# Patient Record
Sex: FEMALE | Race: WHITE | NOT HISPANIC OR LATINO | Employment: FULL TIME | URBAN - METROPOLITAN AREA
[De-identification: names, ages, dates, MRNs, and addresses within clinical notes are randomized per-mention and may not be internally consistent; named-entity substitution may affect disease eponyms.]

---

## 2017-01-25 ENCOUNTER — OFFICE VISIT (OUTPATIENT)
Dept: NEUROLOGY | Facility: CLINIC | Age: 57
End: 2017-01-25
Payer: COMMERCIAL

## 2017-01-25 ENCOUNTER — LAB VISIT (OUTPATIENT)
Dept: LAB | Facility: HOSPITAL | Age: 57
End: 2017-01-25
Attending: PSYCHIATRY & NEUROLOGY
Payer: COMMERCIAL

## 2017-01-25 VITALS
HEART RATE: 73 BPM | HEIGHT: 61 IN | SYSTOLIC BLOOD PRESSURE: 117 MMHG | BODY MASS INDEX: 32.22 KG/M2 | WEIGHT: 170.63 LBS | DIASTOLIC BLOOD PRESSURE: 79 MMHG

## 2017-01-25 DIAGNOSIS — R13.10 DYSPHAGIA, UNSPECIFIED TYPE: Primary | ICD-10-CM

## 2017-01-25 DIAGNOSIS — R13.10 DYSPHAGIA, UNSPECIFIED TYPE: ICD-10-CM

## 2017-01-25 PROCEDURE — 1159F MED LIST DOCD IN RCRD: CPT | Mod: S$GLB,,, | Performed by: PSYCHIATRY & NEUROLOGY

## 2017-01-25 PROCEDURE — 83519 RIA NONANTIBODY: CPT

## 2017-01-25 PROCEDURE — 36415 COLL VENOUS BLD VENIPUNCTURE: CPT

## 2017-01-25 PROCEDURE — 99999 PR PBB SHADOW E&M-EST. PATIENT-LVL III: CPT | Mod: PBBFAC,,, | Performed by: PSYCHIATRY & NEUROLOGY

## 2017-01-25 PROCEDURE — 99214 OFFICE O/P EST MOD 30 MIN: CPT | Mod: S$GLB,,, | Performed by: PSYCHIATRY & NEUROLOGY

## 2017-01-25 NOTE — MR AVS SNAPSHOT
"    [place] [facility]  [address] [place] [address] Ave  [place] [address]  Phone: [phone]  Fax: [phone]                  Jennifer Blake   2017 8:40 AM   Office Visit    Description:  Female : 1960   Provider:  Roosevelt Yee MD   Department:  [facility]           Reason for Visit     Follow-up           Diagnoses this Visit        Comments    Dysphagia, unspecified type    -  Primary            To Do List           Goals (5 Years of Data)     None      [facility]     [facility] On Call Nurse Care Line -  Assistance  Registered nurses in the [facility] provide clinical advisement, health education, appointment booking, and other advisory services.  Call for this free service at [phone].             Medications           Message regarding Medications     Verify the changes and/or additions to your medication regime listed below are the same as discussed with your clinician today.  If any of these changes or additions are incorrect, please notify your healthcare provider.             Verify that the below list of medications is an accurate representation of the medications you are currently taking.  If none reported, the list may be blank. If incorrect, please contact your healthcare provider. Carry this list with you in case of emergency.           Current Medications     atorvastatin (LIPITOR) 10 MG tablet Take 1 tablet (10 mg total) by mouth once daily.    dicyclomine (BENTYL) 10 MG capsule 10 mg 3 (three) times daily.    pantoprazole (PROTONIX) 40 MG tablet Take 40 mg by mouth once daily.           Clinical Reference Information           Vital Signs - Last Recorded  Most recent update: 2017  8:37 AM by Claude Sloan MA    BP Pulse Ht Wt BMI    117/79 (BP Location: Left arm, Patient Position: Sitting, BP Method: Automatic) 73 5' 1" (1.549 m) 77.4 kg (170 lb 10.2 oz) 32.24 kg/m2      Blood Pressure          Most Recent Value    BP  117/79      Allergies as " of 1/25/2017     No Known Allergies      Immunizations Administered on Date of Encounter - 1/25/2017     None      Orders Placed During Today's Visit      Normal Orders This Visit    Ambulatory consult to ENT     Ambulatory referral to Psychiatry     Future Labs/Procedures Expected by Expires    MYASTHENIA GRAVIS PANEL, ADULT  1/25/2017 3/26/2018      [facility] Sign-Up     Activating your [facility] account is as easy as 1-2-3!     1) Visit [email or web address], select Sign Up Now, enter this activation code and your date of birth, then select Next.  [email or web address]  Expires: 1/27/2017  2:51 PM      2) Create a username and password to use when you visit [facility] in the future and select a security question in case you lose your password and select Next.    3) Enter your e-mail address and click Sign Up!    Additional Information  If you have questions, please e-mail [email or web address] or call [phone] to talk to our [facility] staff. Remember, [facility] is NOT to be used for urgent needs. For medical emergencies, dial 911.         Instructions      Taking Medicine Safely    Medicine is given to help treat or prevent illness. But if you dont take it correctly, it might not help. It might even harm you. Your doctor or pharmacist can help you learn the right way to take your medicine. Listed below are some tips to help you take medicine safely.  Safety tips  · Have a routine for taking each medicine. Make it part of something you do each day, such as brushing your teeth or eating a meal.  · When you go to the hospital or your doctors office, bring all your current medicines in their original boxes or bottles. If you cant do that, bring an up-to-date list of your medicines.  · Don't stop taking a prescription medicine unless your doctor tells you to. Doing so could make your condition worse.  · Don't share medicines.  · Let your doctor and pharmacist know of any allergies you have.  · Taking prescription medicines  with alcohol, street drugs, herbs, supplements, or even some over-the-counter medicines can be harmful. Talk to your doctor or pharmacist before using any of these things while taking a prescription medicine.  · When filling your prescriptions, try using the same pharmacy for all your medicines. If not, let the pharmacist know what medicines you are already on.  · Keep medicines out of the reach of children and pets.  · Don't use medicine that has  or that doesnt look or smell right. Get rid of it properly. To find out the right way to get rid of medicine:  ¨ Call your [facility] or [facility] household trash and recycling service and ask if a drug take-back program is available in your community.  ¨ Call your local pharmacy and ask the right way to get rid of the medicine.  ¨ Go to www.fda.gov/ForConsumers/ConsumerUpdates/aco117227 to learn how to get rid of medicines safely.  · Medicine that comes in a container for a single dose should be used only 1 time. If you use the container a second time, it may have germs in it that can cause illness. These illnesses include hepatitis B and C. They also include infections of the brain or spinal cord (meningitis and epidural abscess).   Using generic medicines  Medicines have brand names and generic (chemical) names. When a medicine is first made, it is sold only under its brand name. Later, it can be made and sold as a generic. Generic medicines cost less than brand-name medicines and most work just as well. Most people can use the generic medicine instead of the brand-name medicine, unless their doctor says otherwise.   © 6746-4615 [employer]. [address] [place] [address], [place] [address]. All rights reserved. This information is not intended as a substitute for professional medical care. Always follow your healthcare professional's instructions.

## 2017-01-25 NOTE — PROGRESS NOTES
[facility] NEUROLOGY  [facility]    Date: January 25, 2017   Patient Name: Jennifer Blake   MRN: [id number]   PCP: Matilde Isaacs  Referring Provider: No ref. provider found    Assessment:      Ms. Jennifer Blake is a 55 y.o. female who presents with a chief complaint of Sensation of fullness in her neck.  The patient remains extremely concerned about myasthenia gravis, though I can find no objective evidence on exam or any history suggestive of this diagnosis.  The patient requests serologic testing for myasthenia today, though I have counseled her that seronegative forms of myasthenia gravis, however I do not believe clinically her presentation is consistent with this diagnosis. She declines EMG/NCS which I counseled her would be the gold standard for diagnosis.  She has been evaluated with upper GI endoscopy, however we discussed the possibility of an evaluation by ENT for other causes.  She is amenable to this.  Additionally, the patient admits to significant anxiety and requested referral to psychiatry today.     Plan:       Throat fullness  -- obtaining myasthenia gravis panel per patient request  -- patient referred to ENT    Anxiety and Depression  -- patient referred to psychiatry    Roosevelt Yee MD  [facility]   Department of Neurology    Patient note was created using Dragon Dictation.  Any errors in syntax or even information may not have been identified and edited on initial review prior to signing this note.  Subjective:   Patient seen in consultation at the request of Dr. Isaacs for the evaluation of difficulty swallowing. A copy of this note will be sent to the referring physician.     HPI:   Ms. Jennifer Blake is a 56 y.o. female who presents in follow up for a subjective difficulty swallowing. The patient reports that she feels that she continues to experience and sensation of tightness and fullness in her throat. She continues to complain of the subjective  "tightness and fullness but states that the sensation now seems to improve swallowing, particularly while eating and drinking.  She states that she feels that she has gained several pounds that she is eating more frequently as she states that "it feels good" when she eats. She denies gagging or choking. She denies any weakness, vision change, fatigable chewing, exercise-induced weakness, head drop, diplopia, dysarthria, sensory change, or dysphagia. She states that she underwent an upper GI endoscopy and was told everything was normal during her evaluation. She is tearful throughout much of the visit and remains insistent that she believes that she has myasthenia gravis.  She repeatedly asks for serologic testing for myasthenia out of concern that she may have it as brother had a long and compensated history with the disease. She admits that she is struggling with anxiety and depression and states that she would like to see a psychiatrist    PAST MEDICAL HISTORY:  Past Medical History   Diagnosis Date    GERD (gastroesophageal reflux disease)     Hyperlipidemia     Kidney stone        PAST SURGICAL HISTORY:  Past Surgical History   Procedure Laterality Date    Tonsillectomy      Cystoscopy w/ laser lithotripsy      Esophagogastroduodenoscopy  07/28/2016     Dr. Contreras - Grade B reflux esophagitis and small hiatus hernia    Colonoscopy  09/16/2016     normal - repeat in 10 year - Dr. Damon Contreras = [facility]       CURRENT MEDS:  Current Outpatient Prescriptions   Medication Sig Dispense Refill    atorvastatin (LIPITOR) 10 MG tablet Take 1 tablet (10 mg total) by mouth once daily. 90 tablet 1    dicyclomine (BENTYL) 10 MG capsule 10 mg 3 (three) times daily.  3    pantoprazole (PROTONIX) 40 MG tablet Take 40 mg by mouth once daily.  3     No current facility-administered medications for this visit.        ALLERGIES:  Review of patient's allergies indicates:  No Known Allergies    FAMILY " "HISTORY:  Family History   Problem Relation Age of Onset    Heart disease Mother      passed age 83 heart attack    Diabetes Mother     Diabetes Father     Heart disease Father      CABG around age 65;  heart failure at 68    No Known Problems Sister     Hypertension Brother     Hyperlipidemia Brother     Diabetes Brother     Thyroid disease Sister     Hyperlipidemia Sister     Diabetes Brother     Thyroid disease Brother     Myasthenia gravis Brother     No Known Problems Daughter     No Known Problems Daughter      SOCIAL HISTORY:  Social History   Substance Use Topics    Smoking status: Never Smoker    Smokeless tobacco: None    Alcohol use Yes      Comment: social     Review of Systems:  12 review of systems is negative except for the symptoms mentioned in HPI.      Objective:     Vitals:    17 0835   BP: 117/79   BP Location: Left arm   Patient Position: Sitting   BP Method: Automatic   Pulse: 73   Weight: 77.4 kg (170 lb 10.2 oz)   Height: 5' 1" (1.549 m)     General: NAD, well nourished   Eyes: no tearing, discharge, no erythema   ENT: moist mucous membranes of the oral cavity, nares patent    Neck: Supple, full range of motion, no palpable masses or thyromegaly  Cardiovascular: Warm and well perfused, pulses equal and symmetrical  Lungs: Normal work of breathing, normal chest wall excursions  Skin: No rash, lesions, or breakdown on exposed skin  Psychiatry: Mood and affect are appropriate but intermittently tearful and anxious  Abdomen: soft, non tender, non distended  Extremeties: No cyanosis, clubbing or edema.    Neurological   MENTAL STATUS: Alert and oriented to person, place, and time. Attention and concentration within normal limits. Speech without dysarthria, repetition of lingual able to name and repeat without difficulty. Recent and remote memory within normal limits   CRANIAL NERVES: Lingual, labial, and palatal articulation intact. Visual fields intact. PERRL. EOMI. " Negative cover-uncover test. No ptosis with sustained upgaze. Facial sensation intact. Face symmetrical. Hearing grossly intact. Full shoulder shrug bilaterally. Tongue protrudes midline. Tongue in cheek and cheek puff intact  SENSORY: Sensation is intact to light touch throughout.    MOTOR: Normal bulk and tone. No fasciculations of tongue or body.  No pronator drift.  5/5 Neck flexion and extension. SCMs 5/5. 5/5 deltoid, biceps, triceps, interosseous, hand  bilaterally. 5/5 iliopsoas, knee extension/flexion, foot dorsi/plantarflexion bilaterally.    REFLEXES: Symmetric and 2+ throughout.   CEREBELLAR/COORDINATION/GAIT: Gait steady with normal arm swing and stride length. Normal rapid alternating movements.

## 2017-01-25 NOTE — PATIENT INSTRUCTIONS
Taking Medicine Safely    Medicine is given to help treat or prevent illness. But if you dont take it correctly, it might not help. It might even harm you. Your doctor or pharmacist can help you learn the right way to take your medicine. Listed below are some tips to help you take medicine safely.  Safety tips  · Have a routine for taking each medicine. Make it part of something you do each day, such as brushing your teeth or eating a meal.  · When you go to the hospital or your doctors office, bring all your current medicines in their original boxes or bottles. If you cant do that, bring an up-to-date list of your medicines.  · Don't stop taking a prescription medicine unless your doctor tells you to. Doing so could make your condition worse.  · Don't share medicines.  · Let your doctor and pharmacist know of any allergies you have.  · Taking prescription medicines with alcohol, street drugs, herbs, supplements, or even some over-the-counter medicines can be harmful. Talk to your doctor or pharmacist before using any of these things while taking a prescription medicine.  · When filling your prescriptions, try using the same pharmacy for all your medicines. If not, let the pharmacist know what medicines you are already on.  · Keep medicines out of the reach of children and pets.  · Don't use medicine that has  or that doesnt look or smell right. Get rid of it properly. To find out the right way to get rid of medicine:  ¨ Call your [facility] or [facility] household trash and recycling service and ask if a drug take-back program is available in your community.  ¨ Call your local pharmacy and ask the right way to get rid of the medicine.  ¨ Go to www.fda.gov/ForConsumers/ConsumerUpdates/you964602 to learn how to get rid of medicines safely.  · Medicine that comes in a container for a single dose should be used only 1 time. If you use the container a second time, it may have germs in it that can cause  illness. These illnesses include hepatitis B and C. They also include infections of the brain or spinal cord (meningitis and epidural abscess).   Using generic medicines  Medicines have brand names and generic (chemical) names. When a medicine is first made, it is sold only under its brand name. Later, it can be made and sold as a generic. Generic medicines cost less than brand-name medicines and most work just as well. Most people can use the generic medicine instead of the brand-name medicine, unless their doctor says otherwise.   © 9534-1005 [employer]. [address] [place] [address]. All rights reserved. This information is not intended as a substitute for professional medical care. Always follow your healthcare professional's instructions.

## 2017-02-06 LAB
ACHR BIND AB SER-SCNC: 0 NMOL/L
ACHR MOD AB/ACHR TOTAL SFR SER: 0 %
STRIA MUS AB TITR SER: NEGATIVE TITER
VGCC-N BIND AB SER-SCNC: NORMAL PMOL/L

## 2017-04-25 ENCOUNTER — OFFICE VISIT (OUTPATIENT)
Dept: FAMILY MEDICINE | Facility: CLINIC | Age: 57
End: 2017-04-25
Payer: COMMERCIAL

## 2017-04-25 VITALS
WEIGHT: 174.81 LBS | OXYGEN SATURATION: 97 % | HEIGHT: 61 IN | BODY MASS INDEX: 33 KG/M2 | TEMPERATURE: 98 F | SYSTOLIC BLOOD PRESSURE: 116 MMHG | HEART RATE: 75 BPM | DIASTOLIC BLOOD PRESSURE: 70 MMHG

## 2017-04-25 DIAGNOSIS — R31.9 URINARY TRACT INFECTION WITH HEMATURIA, SITE UNSPECIFIED: ICD-10-CM

## 2017-04-25 DIAGNOSIS — R30.0 DYSURIA: Primary | ICD-10-CM

## 2017-04-25 DIAGNOSIS — N39.0 URINARY TRACT INFECTION WITH HEMATURIA, SITE UNSPECIFIED: ICD-10-CM

## 2017-04-25 LAB
BILIRUB SERPL-MCNC: ABNORMAL MG/DL
BLOOD URINE, POC: ABNORMAL
COLOR, POC UA: ABNORMAL
GLUCOSE UR QL STRIP: ABNORMAL
KETONES UR QL STRIP: ABNORMAL
LEUKOCYTE ESTERASE URINE, POC: ABNORMAL
NITRITE, POC UA: ABNORMAL
PH, POC UA: 5
PROTEIN, POC: 30
SPECIFIC GRAVITY, POC UA: >1.03
UROBILINOGEN, POC UA: 0.2

## 2017-04-25 PROCEDURE — 87088 URINE BACTERIA CULTURE: CPT

## 2017-04-25 PROCEDURE — 99213 OFFICE O/P EST LOW 20 MIN: CPT | Mod: 25,S$GLB,, | Performed by: NURSE PRACTITIONER

## 2017-04-25 PROCEDURE — 99999 PR PBB SHADOW E&M-EST. PATIENT-LVL IV: CPT | Mod: PBBFAC,,, | Performed by: NURSE PRACTITIONER

## 2017-04-25 PROCEDURE — 1160F RVW MEDS BY RX/DR IN RCRD: CPT | Mod: S$GLB,,, | Performed by: NURSE PRACTITIONER

## 2017-04-25 PROCEDURE — 81001 URINALYSIS AUTO W/SCOPE: CPT | Mod: S$GLB,,, | Performed by: NURSE PRACTITIONER

## 2017-04-25 PROCEDURE — 87077 CULTURE AEROBIC IDENTIFY: CPT

## 2017-04-25 PROCEDURE — 87186 SC STD MICRODIL/AGAR DIL: CPT

## 2017-04-25 PROCEDURE — 87086 URINE CULTURE/COLONY COUNT: CPT

## 2017-04-25 RX ORDER — SULFAMETHOXAZOLE AND TRIMETHOPRIM 800; 160 MG/1; MG/1
1 TABLET ORAL 2 TIMES DAILY
Qty: 10 TABLET | Refills: 0 | Status: SHIPPED | OUTPATIENT
Start: 2017-04-25 | End: 2017-04-30

## 2017-04-25 NOTE — MR AVS SNAPSHOT
[address]  [place] [address]  Phone: [phone]  Fax: [phone]                  Jennifer Blake   2017 1:20 PM   Office Visit    Description:  Female : 1960   Provider:  Matilde Isaacs NP   Department:  [facility] ([facility]           Reason for Visit     Urinary Tract Infection           Diagnoses this Visit        Comments    Dysuria    -  Primary     Urinary tract infection with hematuria, site unspecified                To Do List           Goals (5 Years of Data)     None      Follow-Up and Disposition     Return if symptoms worsen or fail to improve.       These Medications        Disp Refills Start End    sulfamethoxazole-trimethoprim 800-160mg (BACTRIM DS) 800-160 mg Tab 10 tablet 0 2017    Take 1 tablet by mouth 2 (two) times daily. - Oral    Pharmacy: [facility] - [place] - [id number] [place] [address] #: [phone]         [facility] On Call     [facility] On Call Nurse Care Line -  Assistance  Unless otherwise directed by your provider, please contact [facility], our nurse care line that is available for  assistance.     Registered nurses in the [facility] provide: appointment scheduling, clinical advisement, health education, and other advisory services.  Call: [phone] (toll free)               Medications           Message regarding Medications     Verify the changes and/or additions to your medication regime listed below are the same as discussed with your clinician today.  If any of these changes or additions are incorrect, please notify your healthcare provider.        START taking these NEW medications        Refills    sulfamethoxazole-trimethoprim 800-160mg (BACTRIM DS) 800-160 mg Tab 0    Sig: Take 1 tablet by mouth 2 (two) times daily.    Class: Normal    Route: Oral      STOP taking these medications     dicyclomine (BENTYL) 10 MG capsule 10 mg 3  "(three) times daily.           Verify that the below list of medications is an accurate representation of the medications you are currently taking.  If none reported, the list may be blank. If incorrect, please contact your healthcare provider. Carry this list with you in case of emergency.           Current Medications     atorvastatin (LIPITOR) 10 MG tablet Take 1 tablet (10 mg total) by mouth once daily.    pantoprazole (PROTONIX) 40 MG tablet Take 40 mg by mouth once daily.    sulfamethoxazole-trimethoprim 800-160mg (BACTRIM DS) 800-160 mg Tab Take 1 tablet by mouth 2 (two) times daily.           Clinical Reference Information           Your Vitals Were     BP Pulse Temp Height Weight SpO2    116/70 75 97.9 °F (36.6 °C) (Oral) 5' 1" (1.549 m) 79.3 kg (174 lb 13.2 oz) 97%    BMI                33.03 kg/m2          Blood Pressure          Most Recent Value    BP  116/70      Allergies as of 4/25/2017     No Known Allergies      Immunizations Administered on Date of Encounter - 4/25/2017     None      Orders Placed During Today's Visit      Normal Orders This Visit    POCT urinalysis, dipstick or tablet reag     Urine culture          4/25/2017  1:55 PM - Noreen Escalona      Component Results     Component    Color, UA    dk yellow    Spec Grav UA    >1.030    pH, UA    5.0    WBC, UA    Large    Nitrite, UA    Neg    Protein    30    Glucose, UA    Neg    Ketones, UA    Neg    Urobilinogen, UA    0.2    Bilirubin    Neg    Blood, UA    Trace            [facility] Sign-Up     Activating your [facility] account is as easy as 1-2-3!     1) Visit [email or web address], select Sign Up Now, enter this activation code and your date of birth, then select Next.  [email or web address]  Expires: 6/9/2017  2:04 PM      2) Create a username and password to use when you visit [facility] in the future and select a security question in case you lose your password and select Next.    3) Enter your e-mail address and click Sign " "Up!    Additional Information  If you have questions, please e-mail [email or web address] or call [phone] to talk to our [facility] staff. Remember, [facility] is NOT to be used for urgent needs. For medical emergencies, dial 911.         Instructions      Bladder Infection, Female (Adult)    Urine is normally doesn't have any bacteria in it. But bacteria can get into the urinary tract from the skin around the rectum. Or they can travel in the blood from elsewhere in the body. Once they are in your urinary tract, they can cause infection in the urethra (urethritis), the bladder (cystitis), or the kidneys (pyelonephritis).  The most common place for an infection is in the bladder. This is called a bladder infection. This is one of the most common infections in women. Most bladder infections are easily treated. They are not serious unless the infection spreads to the kidney.  The phrases "bladder infection," "UTI," and "cystitis" are often used to describe the same thing. But they are not always the same. Cystitis is an inflammation of the bladder. The most common cause of cystitis is an infection.  Symptoms  The infection causes inflammation in the urethra and bladder. This causes many of the symptoms. The most common symptoms of a bladder infection are:  · Pain or burning when urinating  · Having to urinate more often than usual  · Urgent need to urinate  · Only a small amount of urine comes out  · Blood in urine  · Abdominal discomfort. This is usually in the lower abdomen above the pubic bone.  · Cloudy urine  · Strong- or bad-smelling urine  · Unable to urinate (urinary retention)  · Unable to hold urine in (urinary incontinence)  · Fever  · Loss of appetite  · Confusion (in older adults)  Causes  Bladder infections are not contagious. You can't get one from someone else, from a toilet seat, or from sharing a bath.  The most common cause of bladder infections is bacteria from the bowels. The bacteria get onto " the skin around the opening of the urethra. From there, they can get into the urine and travel up to the bladder, causing inflammation and infection. This usually happens because of:  · Wiping improperly after urinating. Always wipe from front to back.  · Bowel incontinence  · Pregnancy  · Procedures such as having a catheter inserted  · Older age  · Not emptying your bladder. This can allow bacteria a chance to grow in your urine.  · Dehydration  · Constipation  · Sex  · Use of a diaphragm for birth control   Treatment  Bladder infections are diagnosed by a urine test. They are treated with antibiotics and usually clear up quickly without complications. Treatment helps prevent a more serious kidney infection.  Medicines  Medicines can help in the treatment of a bladder infection:  · Take antibiotics until they are used up, even if you feel better. It is important to finish them to make sure the infection has cleared.  · You can use acetaminophen or ibuprofen for pain, fever, or discomfort, unless another medicine was prescribed. If you have chronic liver or kidney disease, talk with your healthcare provider before using these medicines. Also talk with your provider if you've ever had a stomach ulcer or gastrointestinal bleeding, or are taking blood-thinner medicines.  · If you are given phenazopydridine to reduce burning with urination, it will cause your urine to become a bright orange color. This can stain clothing.  Care and prevention  These self-care steps can help prevent future infections:  · Drink plenty of fluids to prevent dehydration and flush out your bladder. Do this unless you must restrict fluids for other health reasons, or your doctor told you not to.  · Proper cleaning after going to the bathroom is important. Wipe from front to back after using the toilet to prevent the spread of bacteria.  · Urinate more often. Don't try to hold urine in for a long time.  · Wear loose-fitting clothes and cotton  underwear. Avoid tight-fitting pants.  · Improve your diet and prevent constipation. Eat more fresh fruit and vegetables, and fiber, and less junk and fatty foods.  · Avoid sex until your symptoms are gone.  · Avoid caffeine, alcohol, and spicy foods. These can irritate your bladder.  · Urinate right after intercourse to flush out your bladder.  · If you use birth control pills and have frequent bladder infections, discuss it with your doctor.  Follow-up care  Call your healthcare provider if all symptoms are not gone after 3 days of treatment. This is especially important if you have repeat infections.  If a culture was done, you will be told if your treatment needs to be changed. If directed, you can call to find out the results.  If X-rays were done, you will be told if the results will affect your treatment.  Call 911  Call 911 if any of the following occur:  · Trouble breathing  · Hard to wake up or confusion  · Fainting or loss of consciousness  · Rapid heart rate  When to seek medical advice  Call your healthcare provider right away if any of these occur:  · Fever of 100.4ºF (38.0ºC) or higher, or as directed by your healthcare provider  · Symptoms are not better by the third day of treatment  · Back or belly (abdominal) pain that gets worse  · Repeated vomiting, or unable to keep medicine down  · Weakness or dizziness  · Vaginal discharge  · Pain, redness, or swelling in the outer vaginal area (labia)  Date Last Reviewed: 10/1/2016  © 0880-9422 [employer]. [address] [place] [address], [place] [address]. All rights reserved. This information is not intended as a substitute for professional medical care. Always follow your healthcare professional's instructions.             Language Assistance Services     ATTENTION: Language assistance services are available, free of charge. Please call [phone].      ATENCIÓN: Si habla español, tiene a wilder disposición servicios gratuitos de asistencia  lingüística. Cora al [phone].     KATHY Ý: N?u b?n nói Ti?ng Vi?t, có các d?ch v? h? tr? ngôn ng? mi?n phí dành cho b?n. G?i s? [phone].         [facility] complies with applicable Federal civil rights laws and does not discriminate on the basis of race, color, national origin, age, disability, or sex.

## 2017-04-25 NOTE — PATIENT INSTRUCTIONS

## 2017-04-25 NOTE — PROGRESS NOTES
Subjective:       Patient ID: Jennifer Blake is a 56 y.o. female.    Chief Complaint: Urinary Tract Infection    Urinary Tract Infection    This is a new problem. Progression since onset: started 3 days ago. The quality of the pain is described as burning. The pain is at a severity of 4/10. There has been no fever. She is sexually active. There is no history of pyelonephritis. Associated symptoms include frequency, hesitancy and urgency. Pertinent negatives include no chills, discharge, flank pain, hematuria, nausea, vomiting, bubble bath use, constipation or rash. Associated symptoms comments: Mild burning with urination. She has tried nothing for the symptoms. Her past medical history is significant for kidney stones. There is no history of diabetes mellitus or hypertension.       Previous Medications    ATORVASTATIN (LIPITOR) 10 MG TABLET    Take 1 tablet (10 mg total) by mouth once daily.    PANTOPRAZOLE (PROTONIX) 40 MG TABLET    Take 40 mg by mouth once daily.       Past Medical History:   Diagnosis Date    GERD (gastroesophageal reflux disease)     Hyperlipidemia     Kidney stone        Past Surgical History:   Procedure Laterality Date    COLONOSCOPY  2016    normal - repeat in 10 year - Dr. Damon Contreras = [facility]    CYSTOSCOPY W/ LASER LITHOTRIPSY      ESOPHAGOGASTRODUODENOSCOPY  2016    Dr. Contreras - Grade B reflux esophagitis and small hiatus hernia    TONSILLECTOMY         Family History   Problem Relation Age of Onset    Heart disease Mother      passed age 83 heart attack    Diabetes Mother     Diabetes Father     Heart disease Father      CABG around age 65;  heart failure at 68    No Known Problems Sister     Hypertension Brother     Hyperlipidemia Brother     Diabetes Brother     Thyroid disease Sister     Hyperlipidemia Sister     Diabetes Brother     Thyroid disease Brother     Myasthenia gravis Brother     No Known Problems Daughter     No  "Known Problems Daughter        Social History     Social History    Marital status:      Spouse name: N/A    Number of children: N/A    Years of education: N/A     Occupational History          Social History Main Topics    Smoking status: Never Smoker    Smokeless tobacco: None    Alcohol use Yes      Comment: social    Drug use: No    Sexual activity: Not Asked     Other Topics Concern    None     Social History Narrative       Review of Systems   Constitutional: Negative for appetite change, chills, fatigue, fever and unexpected weight change.   HENT: Negative for congestion, ear pain, mouth sores, nosebleeds, postnasal drip, rhinorrhea, sinus pressure, sneezing, sore throat, trouble swallowing and voice change.    Eyes: Negative for photophobia, pain, discharge, redness, itching and visual disturbance.   Respiratory: Negative for cough, chest tightness and shortness of breath.    Cardiovascular: Negative for chest pain, palpitations and leg swelling.   Gastrointestinal: Negative for abdominal pain, blood in stool, constipation, diarrhea, nausea and vomiting.   Genitourinary: Positive for dysuria, frequency, hesitancy and urgency. Negative for flank pain and hematuria.        Suprapubic pressure/pain   Musculoskeletal: Negative for arthralgias, back pain, joint swelling and myalgias.   Skin: Negative for color change and rash.   Allergic/Immunologic: Negative for immunocompromised state.   Neurological: Negative for dizziness, seizures, syncope, weakness and headaches.   Hematological: Negative for adenopathy. Does not bruise/bleed easily.   Psychiatric/Behavioral: Negative for agitation, dysphoric mood, sleep disturbance and suicidal ideas. The patient is not nervous/anxious.          Objective:     Vitals:    04/25/17 1318   BP: 116/70   Pulse: 75   Temp: 97.9 °F (36.6 °C)   TempSrc: Oral   SpO2: 97%   Weight: 79.3 kg (174 lb 13.2 oz)   Height: 5' 1" (1.549 m)          Physical Exam "   Constitutional: She is oriented to person, place, and time. She appears well-developed and well-nourished. No distress.   + obesity with Body mass index is 30.91 kg/(m^2).     HENT:   Head: Normocephalic and atraumatic.   Right Ear: External ear normal. Tympanic membrane is scarred and perforated.   Left Ear: External ear normal. Tympanic membrane is scarred and perforated.   Ears:    Nose: Nose normal.   Mouth/Throat: Oropharynx is clear and moist. No oropharyngeal exudate.   + old perforations to six o'clock position on bilateral TMs.  Patient states she does not think she had tubes in ears before.  + scattered scarring as well to bilateral TMs.   Eyes: EOM are normal. Pupils are equal, round, and reactive to light.   Neck: Normal range of motion. Neck supple. No tracheal deviation present. No thyromegaly present.   Cardiovascular: Normal rate, regular rhythm and normal heart sounds.    No murmur heard.  Pulmonary/Chest: Effort normal and breath sounds normal. No respiratory distress.   Abdominal: Soft. Bowel sounds are normal. She exhibits no distension and no mass. There is no tenderness. There is no rebound and no guarding. No hernia.   Musculoskeletal: Normal range of motion. She exhibits no edema.   Lymphadenopathy:     She has no cervical adenopathy.   Neurological: She is alert and oriented to person, place, and time. No cranial nerve deficit. Coordination normal.   Skin: Skin is warm and dry. No rash noted. She is not diaphoretic.   Psychiatric: She has a normal mood and affect.       Office Visit on 04/25/2017   Component Date Value Ref Range Status    Color, UA 04/25/2017 dk yellow   Final    Spec Grav UA 04/25/2017 >1.030   Final    pH, UA 04/25/2017 5.0   Final    WBC, UA 04/25/2017 Large   Final    Nitrite, UA 04/25/2017 Neg   Final    Protein 04/25/2017 30   Final    Glucose, UA 04/25/2017 Neg   Final    Ketones, UA 04/25/2017 Neg   Final    Urobilinogen, UA 04/25/2017 0.2   Final     Bilirubin 04/25/2017 Neg   Final    Blood, UA 04/25/2017 Trace   Final       Assessment:         ICD-10-CM ICD-9-CM   1. Dysuria R30.0 788.1   2. Urinary tract infection with hematuria, site unspecified N39.0 599.0    R31.9        Plan:       Dysuria  -     POCT urinalysis, dipstick or tablet reag    Urinary tract infection with hematuria, site unspecified  -  + large leukocytes - will treat with bactrim and send in culture.  See handout for other home care instructions.  -     Urine culture  -     sulfamethoxazole-trimethoprim 800-160mg (BACTRIM DS) 800-160 mg Tab; Take 1 tablet by mouth 2 (two) times daily.  Dispense: 10 tablet; Refill: 0    Return if symptoms worsen or fail to improve.     Patient's Medications   New Prescriptions    SULFAMETHOXAZOLE-TRIMETHOPRIM 800-160MG (BACTRIM DS) 800-160 MG TAB    Take 1 tablet by mouth 2 (two) times daily.   Previous Medications    ATORVASTATIN (LIPITOR) 10 MG TABLET    Take 1 tablet (10 mg total) by mouth once daily.    PANTOPRAZOLE (PROTONIX) 40 MG TABLET    Take 40 mg by mouth once daily.   Modified Medications    No medications on file   Discontinued Medications    DICYCLOMINE (BENTYL) 10 MG CAPSULE    10 mg 3 (three) times daily.

## 2017-04-28 ENCOUNTER — TELEPHONE (OUTPATIENT)
Dept: FAMILY MEDICINE | Facility: CLINIC | Age: 57
End: 2017-04-28

## 2017-04-28 LAB — BACTERIA UR CULT: NORMAL

## 2017-04-28 NOTE — TELEPHONE ENCOUNTER
----- Message from Matilde Isaacs NP sent at 4/28/2017  1:14 PM CDT -----  Please call patient and inform her that her urine cultures shows that the bacteria is sensitive to Bactrim - take antibiotic until completed and this should resolve the infection.  Please document when patient is notified.

## 2017-06-27 ENCOUNTER — TELEPHONE (OUTPATIENT)
Dept: FAMILY MEDICINE | Facility: CLINIC | Age: 57
End: 2017-06-27

## 2017-06-27 DIAGNOSIS — Z13.29 THYROID DISORDER SCREEN: ICD-10-CM

## 2017-06-27 DIAGNOSIS — Z13.0 SCREENING, ANEMIA, DEFICIENCY, IRON: Primary | ICD-10-CM

## 2017-06-27 NOTE — TELEPHONE ENCOUNTER
Gladys, CMP and Lipid were already in so I added the CBC and TSH for 4 labs total - schedule lab appointment and then WELLNESS follow up.  The WELLNESS must be after July 26th since that was the last wellness UNLESS patient is just looking to have follow up appointment on cholesterol - then she just needs CMP and Lipid

## 2017-06-27 NOTE — TELEPHONE ENCOUNTER
----- Message from Thea Mckeon sent at 6/27/2017 10:29 AM CDT -----  Contact: [phone]  Patient would like blood work orders put in the system for an upcoming appointment and she would like a callback to schedule labs.  Please advise

## 2017-08-02 ENCOUNTER — OFFICE VISIT (OUTPATIENT)
Dept: FAMILY MEDICINE | Facility: CLINIC | Age: 57
End: 2017-08-02
Payer: COMMERCIAL

## 2017-08-02 VITALS
DIASTOLIC BLOOD PRESSURE: 80 MMHG | OXYGEN SATURATION: 98 % | BODY MASS INDEX: 31.92 KG/M2 | WEIGHT: 169.06 LBS | SYSTOLIC BLOOD PRESSURE: 118 MMHG | HEIGHT: 61 IN | TEMPERATURE: 98 F | HEART RATE: 64 BPM

## 2017-08-02 DIAGNOSIS — K21.9 GASTROESOPHAGEAL REFLUX DISEASE, ESOPHAGITIS PRESENCE NOT SPECIFIED: ICD-10-CM

## 2017-08-02 DIAGNOSIS — Z00.00 ANNUAL PHYSICAL EXAM: Primary | ICD-10-CM

## 2017-08-02 DIAGNOSIS — E78.5 HYPERLIPIDEMIA, UNSPECIFIED HYPERLIPIDEMIA TYPE: ICD-10-CM

## 2017-08-02 PROCEDURE — 99999 PR PBB SHADOW E&M-EST. PATIENT-LVL III: CPT | Mod: PBBFAC,,, | Performed by: NURSE PRACTITIONER

## 2017-08-02 PROCEDURE — 99396 PREV VISIT EST AGE 40-64: CPT | Mod: S$GLB,,, | Performed by: NURSE PRACTITIONER

## 2017-08-02 NOTE — PROGRESS NOTES
Subjective:       Patient ID: Hallie Blake is a 56 y.o. female.    Chief Complaint: Annual Exam (wellness)    Patient is a 56 year old white female here today for annual physical exam with fasting lab results.    Patient has Hyperlipidemia that is controlled on Atorvastatin at present dose with LDL < 100.  See results below.    Patient has chronic GERD - had last EGD in July 2016 - showed Grade B reflux esophagitis and small hiatus hernia.    Patient voices no complaints today.    Wellness labs:  -  CBC WNL  -  CMP WNL  -  Cholesterol controlled on present medication.  -  TSH WNL    Health Maintenance:  -  Due for repeat mammogram - will schedule with Dr. Abreu.  -  We do not have Flu Vaccines available at this time.          Component      Latest Ref Rng & Units 7/6/2017 7/11/2016 6/22/2015   WBC      3.90 - 12.70 K/uL 6.22 7.92    RBC      4.00 - 5.40 M/uL 4.33 4.31    Hemoglobin      12.0 - 16.0 g/dL 13.2 13.2    Hematocrit      37.0 - 48.5 % 41.0 41.3    MCV      82 - 98 fL 95 96    MCH      27.0 - 31.0 pg 30.5 30.6    MCHC      32.0 - 36.0 % 32.2 32.0    RDW      11.5 - 14.5 % 13.8 14.0    Platelets      150 - 350 K/uL 227 247    MPV      9.2 - 12.9 fL 10.2 10.0    Gran #      1.8 - 7.7 K/uL 3.2 5.3    Lymph #      1.0 - 4.8 K/uL 2.2 2.1    Mono #      0.3 - 1.0 K/uL 0.5 0.4    Eos #      0.0 - 0.5 K/uL 0.3 0.1    Baso #      0.00 - 0.20 K/uL 0.01 0.01    Gran%      38.0 - 73.0 % 52.1 66.6    Lymph%      18.0 - 48.0 % 35.0 26.1    Mono%      4.0 - 15.0 % 8.5 5.6    Eosinophil%      0.0 - 8.0 % 4.2 1.5    Basophil%      0.0 - 1.9 % 0.2 0.1    Differential Method       Automated Automated    Sodium      136 - 145 mmol/L 143 143 142   Potassium      3.5 - 5.1 mmol/L 4.2 4.2 4.1   Chloride      95 - 110 mmol/L 107 108 109   CO2      23 - 29 mmol/L 26 24 27   Glucose      70 - 110 mg/dL 88 91 89   BUN, Bld      7 - 17 mg/dL 16 15 13   Creatinine      0.50 - 1.40 mg/dL 0.65 0.8 0.8   Calcium      8.7 - 10.5 mg/dL  9.6 9.5 9.5   Total Protein      6.0 - 8.4 g/dL 7.4 7.4 7.0   Albumin      3.5 - 5.2 g/dL 4.2 4.1 4.0   Total Bilirubin      0.1 - 1.0 mg/dL 0.6 0.6 0.6   Alkaline Phosphatase      38 - 126 U/L 111 126 102   AST      15 - 46 U/L 30 30 23   ALT      10 - 44 U/L 42 32 19   Anion Gap      8 - 16 mmol/L 10 11 6 (L)   eGFR if African American      >60 mL/min/1.73 m:2 >60.0 >60.0 >60.0   eGFR if non African American      >60 mL/min/1.73 m:2 >60.0 >60.0 >60.0   Cholesterol      120 - 199 mg/dL 169 211 (H) 201 (H)   Triglycerides      30 - 150 mg/dL 116 144 174 (H)   HDL      40 - 75 mg/dL 48 55 54   LDL Cholesterol      63.0 - 159.0 mg/dL 97.8 127.2 112.2   HDL/Chol Ratio      20.0 - 50.0 % 28.4 26.1 26.9   Total Cholesterol/HDL Ratio      2.0 - 5.0 3.5 3.8 3.7   Non-HDL Cholesterol      mg/dL 121 156 147   TSH      0.400 - 4.000 uIU/mL 1.520 1.786        Previous Medications    ATORVASTATIN (LIPITOR) 10 MG TABLET    Take 1 tablet (10 mg total) by mouth once daily.    PANTOPRAZOLE (PROTONIX) 40 MG TABLET    Take 40 mg by mouth once daily.       Past Medical History:   Diagnosis Date    GERD (gastroesophageal reflux disease)     Hyperlipidemia     Kidney stone        Past Surgical History:   Procedure Laterality Date    COLONOSCOPY  2016    normal - repeat in 10 year - Dr. Damon Contreras = [facility]    CYSTOSCOPY W/ LASER LITHOTRIPSY      ESOPHAGOGASTRODUODENOSCOPY  2016    Dr. Contreras - Grade B reflux esophagitis and small hiatus hernia    TONSILLECTOMY         Family History   Problem Relation Age of Onset    Heart disease Mother      passed age 83 heart attack    Diabetes Mother     Diabetes Father     Heart disease Father      CABG around age 65;  heart failure at 68    No Known Problems Sister     Hypertension Brother     Hyperlipidemia Brother     Diabetes Brother     Thyroid disease Sister     Hyperlipidemia Sister     Diabetes Brother     Thyroid disease Brother      Myasthenia gravis Brother     No Known Problems Daughter     No Known Problems Daughter        Social History     Social History    Marital status:      Spouse name: N/A    Number of children: N/A    Years of education: N/A     Occupational History          Social History Main Topics    Smoking status: Never Smoker    Smokeless tobacco: Never Used    Alcohol use Yes      Comment: social    Drug use: No    Sexual activity: Not Asked     Other Topics Concern    None     Social History Narrative    None       Review of Systems   Constitutional: Negative for appetite change, chills, fatigue, fever and unexpected weight change.   HENT: Negative for congestion, ear pain, mouth sores, nosebleeds, postnasal drip, rhinorrhea, sinus pressure, sneezing, sore throat, trouble swallowing and voice change.    Eyes: Negative for photophobia, pain, discharge, redness, itching and visual disturbance.   Respiratory: Negative for cough, chest tightness and shortness of breath.    Cardiovascular: Negative for chest pain, palpitations and leg swelling.   Gastrointestinal: Negative for abdominal pain, blood in stool, constipation, diarrhea, nausea and vomiting.   Genitourinary: Negative for dysuria, frequency, hematuria and urgency.   Musculoskeletal: Negative for arthralgias, back pain, joint swelling and myalgias.   Skin: Negative for color change and rash.   Allergic/Immunologic: Negative for immunocompromised state.   Neurological: Negative for dizziness, seizures, syncope, weakness and headaches.   Hematological: Negative for adenopathy. Does not bruise/bleed easily.   Psychiatric/Behavioral: Negative for agitation, dysphoric mood, sleep disturbance and suicidal ideas. The patient is not nervous/anxious.          Objective:     Vitals:    08/02/17 0807   BP: 118/80   BP Location: Right arm   Patient Position: Sitting   BP Method: Manual   Pulse: 64   Temp: 97.8 °F (36.6 °C)   TempSrc: Oral   SpO2: 98%  "  Weight: 76.7 kg (169 lb 1.5 oz)   Height: 5' 1" (1.549 m)          Physical Exam   Constitutional: She is oriented to person, place, and time. She appears well-developed and well-nourished. No distress.   + obesity with Body mass index is 31.95 kg/m².       HENT:   Head: Normocephalic and atraumatic.   Right Ear: External ear normal. Tympanic membrane is scarred and perforated.   Left Ear: External ear normal. Tympanic membrane is scarred and perforated.   Ears:    Nose: Nose normal.   Mouth/Throat: Oropharynx is clear and moist. No oropharyngeal exudate.   + old perforations to six o'clock position on bilateral TMs.  Patient states she does not think she had tubes in ears before.  + scattered scarring as well to bilateral TMs.   Eyes: EOM are normal. Pupils are equal, round, and reactive to light.   Neck: Normal range of motion. Neck supple. No tracheal deviation present. No thyromegaly present.   Cardiovascular: Normal rate, regular rhythm and normal heart sounds.    No murmur heard.  Pulmonary/Chest: Effort normal and breath sounds normal. No respiratory distress.   Abdominal: Soft. Bowel sounds are normal. She exhibits no distension and no mass. There is no tenderness. There is no rebound and no guarding. No hernia.   Musculoskeletal: Normal range of motion. She exhibits no edema.   Lymphadenopathy:     She has no cervical adenopathy.   Neurological: She is alert and oriented to person, place, and time. No cranial nerve deficit. Coordination normal.   Skin: Skin is warm and dry. No rash noted. She is not diaphoretic.   Psychiatric: She has a normal mood and affect.         Assessment:         ICD-10-CM ICD-9-CM   1. Annual physical exam Z00.00 V70.0   2. Hyperlipidemia, unspecified hyperlipidemia type E78.5 272.4   3. Gastroesophageal reflux disease, esophagitis presence not specified K21.9 530.81       Plan:       Annual physical exam  -  Will schedule mammogram with Dr. Abreu.  Health Maintenance Summary "     Influenza Vaccine Postponed 10/2/2017 Originally 8/1/2017. Patient Scheduled    Declined 4/25/2017    Mammogram Next Due 11/2/2017     Done 11/2/2015    Pap Smear with HPV Cotest Next Due 11/2/2018     Done 11/2/2015    Lipid Panel Next Due 7/6/2022     Done 7/6/2017 LIPID PANEL    Done 7/11/2016 LIPID PANEL (A)    Done 6/22/2015 LIPID PANEL (A)   TETANUS VACCINE Next Due 7/26/2026     Done 7/26/2016 Imm Admin: Tdap    Done 8/5/2002 Imm Admin: Tdap   Colonoscopy Next Due 9/16/2026     Done 9/16/2016     Declined 7/26/2016 states she will schedule with Dr. Contreras   Hepatitis C Screening Completed     Done 7/11/2016 HEPATITIS C ANTIBODY         Hyperlipidemia, unspecified hyperlipidemia type  -  Controlled on Atorvastatin 10 mg daily.  Will send refill request when needed.  Return in 6 months.    Gastroesophageal reflux disease, esophagitis presence not specified  -  Controlled on Protonix prescribed by GI MD, Dr. Contreras.      Return in about 6 months (around 2/2/2018) for fasting labs and office visit.     Patient's Medications   New Prescriptions    No medications on file   Previous Medications    ATORVASTATIN (LIPITOR) 10 MG TABLET    Take 1 tablet (10 mg total) by mouth once daily.    PANTOPRAZOLE (PROTONIX) 40 MG TABLET    Take 40 mg by mouth once daily.   Modified Medications    No medications on file   Discontinued Medications    No medications on file

## 2017-08-14 RX ORDER — ATORVASTATIN CALCIUM 10 MG/1
10 TABLET, FILM COATED ORAL DAILY
Qty: 90 TABLET | Refills: 1 | Status: SHIPPED | OUTPATIENT
Start: 2017-08-14 | End: 2018-03-22 | Stop reason: SDUPTHER

## 2017-08-14 NOTE — TELEPHONE ENCOUNTER
----- Message from Flor Juares sent at 8/14/2017 12:07 PM CDT -----  Contact: self/[phone]  The pharmacy sent over a request on the atorvastatin (LIPITOR) 10 MG tablet has not receive response.

## 2017-10-31 ENCOUNTER — OFFICE VISIT (OUTPATIENT)
Dept: FAMILY MEDICINE | Facility: CLINIC | Age: 57
End: 2017-10-31
Payer: COMMERCIAL

## 2017-10-31 VITALS
SYSTOLIC BLOOD PRESSURE: 110 MMHG | TEMPERATURE: 98 F | DIASTOLIC BLOOD PRESSURE: 78 MMHG | HEART RATE: 72 BPM | WEIGHT: 174.19 LBS | HEIGHT: 61 IN | BODY MASS INDEX: 32.89 KG/M2 | OXYGEN SATURATION: 97 %

## 2017-10-31 DIAGNOSIS — N39.0 URINARY TRACT INFECTION WITH HEMATURIA, SITE UNSPECIFIED: Primary | ICD-10-CM

## 2017-10-31 DIAGNOSIS — R31.9 URINARY TRACT INFECTION WITH HEMATURIA, SITE UNSPECIFIED: Primary | ICD-10-CM

## 2017-10-31 DIAGNOSIS — R30.0 DYSURIA: ICD-10-CM

## 2017-10-31 LAB
BILIRUB SERPL-MCNC: NEGATIVE MG/DL
BLOOD URINE, POC: ABNORMAL
COLOR, POC UA: ABNORMAL
GLUCOSE UR QL STRIP: NEGATIVE
KETONES UR QL STRIP: NEGATIVE
LEUKOCYTE ESTERASE URINE, POC: ABNORMAL
NITRITE, POC UA: NEGATIVE
PH, POC UA: 5.5
PROTEIN, POC: 30
SPECIFIC GRAVITY, POC UA: 1.03
UROBILINOGEN, POC UA: 0.2

## 2017-10-31 PROCEDURE — 99213 OFFICE O/P EST LOW 20 MIN: CPT | Mod: 25,S$GLB,, | Performed by: NURSE PRACTITIONER

## 2017-10-31 PROCEDURE — 99999 PR PBB SHADOW E&M-EST. PATIENT-LVL IV: CPT | Mod: PBBFAC,,, | Performed by: NURSE PRACTITIONER

## 2017-10-31 PROCEDURE — 81001 URINALYSIS AUTO W/SCOPE: CPT | Mod: S$GLB,,, | Performed by: NURSE PRACTITIONER

## 2017-10-31 PROCEDURE — 87086 URINE CULTURE/COLONY COUNT: CPT

## 2017-10-31 RX ORDER — CIPROFLOXACIN 250 MG/1
250 TABLET, FILM COATED ORAL 2 TIMES DAILY
Qty: 10 TABLET | Refills: 0 | Status: SHIPPED | OUTPATIENT
Start: 2017-10-31 | End: 2017-11-05

## 2017-10-31 NOTE — PATIENT INSTRUCTIONS

## 2017-11-01 LAB — BACTERIA UR CULT: NO GROWTH

## 2018-03-22 ENCOUNTER — OFFICE VISIT (OUTPATIENT)
Dept: FAMILY MEDICINE | Facility: CLINIC | Age: 58
End: 2018-03-22
Payer: COMMERCIAL

## 2018-03-22 VITALS
DIASTOLIC BLOOD PRESSURE: 70 MMHG | BODY MASS INDEX: 31.51 KG/M2 | SYSTOLIC BLOOD PRESSURE: 104 MMHG | HEIGHT: 61 IN | TEMPERATURE: 98 F | HEART RATE: 70 BPM | OXYGEN SATURATION: 97 % | WEIGHT: 166.88 LBS

## 2018-03-22 DIAGNOSIS — Z13.1 DIABETES MELLITUS SCREENING: ICD-10-CM

## 2018-03-22 DIAGNOSIS — Z12.39 BREAST CANCER SCREENING: ICD-10-CM

## 2018-03-22 DIAGNOSIS — K21.9 GASTROESOPHAGEAL REFLUX DISEASE, ESOPHAGITIS PRESENCE NOT SPECIFIED: ICD-10-CM

## 2018-03-22 DIAGNOSIS — Z13.0 SCREENING, ANEMIA, DEFICIENCY, IRON: ICD-10-CM

## 2018-03-22 DIAGNOSIS — E78.5 HYPERLIPIDEMIA, UNSPECIFIED HYPERLIPIDEMIA TYPE: Primary | ICD-10-CM

## 2018-03-22 DIAGNOSIS — Z13.29 THYROID DISORDER SCREEN: ICD-10-CM

## 2018-03-22 DIAGNOSIS — F43.0 STRESS REACTION CAUSING MIXED DISTURBANCE OF EMOTION AND CONDUCT: ICD-10-CM

## 2018-03-22 PROCEDURE — 99999 PR PBB SHADOW E&M-EST. PATIENT-LVL IV: CPT | Mod: PBBFAC,,, | Performed by: NURSE PRACTITIONER

## 2018-03-22 PROCEDURE — 99214 OFFICE O/P EST MOD 30 MIN: CPT | Mod: S$GLB,,, | Performed by: NURSE PRACTITIONER

## 2018-03-22 RX ORDER — ESCITALOPRAM OXALATE 10 MG/1
10 TABLET ORAL DAILY
Qty: 30 TABLET | Refills: 1 | Status: SHIPPED | OUTPATIENT
Start: 2018-03-22 | End: 2019-02-20

## 2018-03-22 RX ORDER — PANTOPRAZOLE SODIUM 40 MG/1
40 TABLET, DELAYED RELEASE ORAL DAILY
Qty: 90 TABLET | Refills: 1 | Status: SHIPPED | OUTPATIENT
Start: 2018-03-22 | End: 2019-02-20 | Stop reason: SDUPTHER

## 2018-03-22 RX ORDER — ATORVASTATIN CALCIUM 10 MG/1
10 TABLET, FILM COATED ORAL DAILY
Qty: 90 TABLET | Refills: 1 | Status: SHIPPED | OUTPATIENT
Start: 2018-03-22 | End: 2018-09-05 | Stop reason: SDUPTHER

## 2018-03-22 NOTE — PROGRESS NOTES
Subjective:       Patient ID: Hallie Blake is a 57 y.o. female.    Chief Complaint: Follow-up (lab results)    Patient is here today for follow-up with fasting lab results.    Patient has hyperlipidemia and takes atorvastatin 10 mg daily.  Patient admits to noncompliance with medication stating she forgets to take daily.  Her total cholesterol is elevated at 207 with an LDL of 124.8.  Advised patient that we need better compliance with medications and lifestyle modifications.    Patient has chronic GERD.  She takes Protonix as needed.  She had her last EGD in July 2016 that showed grade B reflux esophagitis and a small hiatus hernia.    Patient reports depressed mood and anxiety.  She reports she has increased stressors at home.  She reports she is thinking of retiring and has concerns for the future.  She reports concern for her daughter's key is with increased stressors.  She would like to discuss medication.  She has no history of anxiety or depression and has never been on a medication in the past.    Patient is overdue for mammogram.  She will like to have done at diagnostic imaging.  External order given to patient.      Component      Latest Ref Rng & Units 3/14/2018 7/6/2017 7/11/2016 6/22/2015   Sodium      136 - 145 mmol/L 143 143 143 142   Potassium      3.5 - 5.1 mmol/L 4.0 4.2 4.2 4.1   Chloride      95 - 110 mmol/L 105 107 108 109   CO2      23 - 29 mmol/L 28 26 24 27   Glucose      70 - 110 mg/dL 94 88 91 89   BUN, Bld      7 - 17 mg/dL 14 16 15 13   Creatinine      0.50 - 1.40 mg/dL 0.65 0.65 0.8 0.8   Calcium      8.7 - 10.5 mg/dL 9.7 9.6 9.5 9.5   Total Protein      6.0 - 8.4 g/dL 7.7 7.4 7.4 7.0   Albumin      3.5 - 5.2 g/dL 4.5 4.2 4.1 4.0   Total Bilirubin      0.1 - 1.0 mg/dL 0.4 0.6 0.6 0.6   Alkaline Phosphatase      38 - 126 U/L 113 111 126 102   AST      15 - 46 U/L 29 30 30 23   ALT      10 - 44 U/L 38 42 32 19   Anion Gap      8 - 16 mmol/L 10 10 11 6 (L)   eGFR if        >60 mL/min/1.73 m:2 >60.0 >60.0 >60.0 >60.0   eGFR if non African American      >60 mL/min/1.73 m:2 >60.0 >60.0 >60.0 >60.0   Cholesterol      120 - 199 mg/dL 207 (H) 169 211 (H) 201 (H)   Triglycerides      30 - 150 mg/dL 156 (H) 116 144 174 (H)   HDL      40 - 75 mg/dL 51 48 55 54   LDL Cholesterol      63.0 - 159.0 mg/dL 124.8 97.8 127.2 112.2   HDL/Chol Ratio      20.0 - 50.0 % 24.6 28.4 26.1 26.9   Total Cholesterol/HDL Ratio      2.0 - 5.0 4.1 3.5 3.8 3.7   Non-HDL Cholesterol      mg/dL 156 121 156 147       Previous Medications    ATORVASTATIN (LIPITOR) 10 MG TABLET    Take 1 tablet (10 mg total) by mouth once daily.    PANTOPRAZOLE (PROTONIX) 40 MG TABLET    Take 40 mg by mouth once daily.       Past Medical History:   Diagnosis Date    GERD (gastroesophageal reflux disease)     Hyperlipidemia     Kidney stone        Past Surgical History:   Procedure Laterality Date    COLONOSCOPY  2016    normal - repeat in 10 year - Dr. Damon Contreras = [facility]    CYSTOSCOPY W/ LASER LITHOTRIPSY      ESOPHAGOGASTRODUODENOSCOPY  2016    Dr. Contreras - Grade B reflux esophagitis and small hiatus hernia    TONSILLECTOMY         Family History   Problem Relation Age of Onset    Heart disease Mother      passed age 83 heart attack    Diabetes Mother     Diabetes Father     Heart disease Father      CABG around age 65;  heart failure at 68    No Known Problems Sister     Hypertension Brother     Hyperlipidemia Brother     Diabetes Brother     Thyroid disease Sister     Hyperlipidemia Sister     Diabetes Brother     Thyroid disease Brother     Myasthenia gravis Brother     No Known Problems Daughter     No Known Problems Daughter        Social History     Social History    Marital status:      Spouse name: N/A    Number of children: N/A    Years of education: N/A     Occupational History          Social History Main Topics    Smoking status: Never  "Smoker    Smokeless tobacco: Never Used    Alcohol use Yes      Comment: social    Drug use: No    Sexual activity: Not Asked     Other Topics Concern    None     Social History Narrative    None       Review of Systems   Constitutional: Negative for appetite change, chills, fatigue, fever and unexpected weight change.   HENT: Negative for congestion, ear pain, mouth sores, nosebleeds, postnasal drip, rhinorrhea, sinus pressure, sneezing, sore throat, trouble swallowing and voice change.    Eyes: Negative for photophobia, pain, discharge, redness, itching and visual disturbance.   Respiratory: Negative for cough, chest tightness and shortness of breath.    Cardiovascular: Negative for chest pain, palpitations and leg swelling.   Gastrointestinal: Negative for abdominal pain, blood in stool, constipation, diarrhea, nausea and vomiting.   Genitourinary: Negative for dysuria, frequency, hematuria and urgency.   Musculoskeletal: Negative for arthralgias, back pain, joint swelling and myalgias.   Skin: Negative for color change and rash.   Allergic/Immunologic: Negative for immunocompromised state.   Neurological: Negative for dizziness, seizures, syncope, weakness and headaches.   Hematological: Negative for adenopathy. Does not bruise/bleed easily.   Psychiatric/Behavioral: Positive for decreased concentration, dysphoric mood and sleep disturbance. Negative for agitation and suicidal ideas. The patient is nervous/anxious.          Objective:     Vitals:    03/22/18 0845   BP: 104/70   BP Location: Right arm   Patient Position: Lying   BP Method: Medium (Manual)   Pulse: 70   Temp: 98.4 °F (36.9 °C)   TempSrc: Oral   SpO2: 97%   Weight: 75.7 kg (166 lb 14.2 oz)   Height: 5' 1" (1.549 m)          Physical Exam   Constitutional: She is oriented to person, place, and time. She appears well-developed and well-nourished. No distress.   + obesity with Body mass index is 31.53 kg/m².         HENT:   Head: Normocephalic " and atraumatic.   Right Ear: External ear normal. Tympanic membrane is scarred and perforated.   Left Ear: External ear normal. Tympanic membrane is scarred and perforated.   Ears:    Nose: Nose normal.   Mouth/Throat: Oropharynx is clear and moist. No oropharyngeal exudate.   + old perforations to six o'clock position on bilateral TMs.  Patient states she does not think she had tubes in ears before.  + scattered scarring as well to bilateral TMs.   Eyes: EOM are normal. Pupils are equal, round, and reactive to light.   Neck: Normal range of motion. Neck supple. No tracheal deviation present. No thyromegaly present.   Cardiovascular: Normal rate, regular rhythm and normal heart sounds.    No murmur heard.  Pulmonary/Chest: Effort normal and breath sounds normal. No respiratory distress.   Abdominal: Soft. Bowel sounds are normal. She exhibits no distension and no mass. There is no tenderness. There is no rebound and no guarding. No hernia.   Musculoskeletal: Normal range of motion. She exhibits no edema.   Lymphadenopathy:     She has no cervical adenopathy.   Neurological: She is alert and oriented to person, place, and time. No cranial nerve deficit. Coordination normal.   Skin: Skin is warm and dry. No rash noted. She is not diaphoretic.   Psychiatric: She has a normal mood and affect.         Assessment:         ICD-10-CM ICD-9-CM   1. Hyperlipidemia, unspecified hyperlipidemia type E78.5 272.4   2. Gastroesophageal reflux disease, esophagitis presence not specified K21.9 530.81   3. Stress reaction causing mixed disturbance of emotion and conduct F43.0 308.4   4. Screening, anemia, deficiency, iron Z13.0 V78.0   5. Thyroid disorder screen Z13.29 V77.0   6. Diabetes mellitus screening Z13.1 V77.1   7. Breast cancer screening Z12.31 V76.10       Plan:       Hyperlipidemia, unspecified hyperlipidemia type  -  Patient admits to noncompliance with daily use of medication.  Strongly advised patient to take medication  daily and stricter lifestyle modifications.  We will recheck fasting labs in 6 months.  If LDL is not below 100, we will need to increase her medication.  -     atorvastatin (LIPITOR) 10 MG tablet; Take 1 tablet (10 mg total) by mouth once daily.  Dispense: 90 tablet; Refill: 1  -     Lipid panel; Future; Expected date: 09/17/2018    Gastroesophageal reflux disease, esophagitis presence not specified  -  Takes Protonix as needed.  -     pantoprazole (PROTONIX) 40 MG tablet; Take 1 tablet (40 mg total) by mouth once daily.  Dispense: 90 tablet; Refill: 1    Stress reaction causing mixed disturbance of emotion and conduct  -  Start Lexapro 10 mg daily.  Advised patient that it will take 2 weeks to note the positive benefits of medication.  We will follow-up in 6 weeks.  -     escitalopram oxalate (LEXAPRO) 10 MG tablet; Take 1 tablet (10 mg total) by mouth once daily.  Dispense: 30 tablet; Refill: 1    Screening, anemia, deficiency, iron  -     CBC auto differential; Future; Expected date: 09/17/2018    Thyroid disorder screen  -     TSH; Future; Expected date: 09/17/2018    Diabetes mellitus screening  -     Comprehensive metabolic panel; Future; Expected date: 09/17/2018    Breast cancer screening  -  External order for diagnostic imaging was given to patient and advised her to schedule her mammogram.  -     Mammo Digital Screening Bilat with CAD; Future; Expected date: 03/22/2018      Follow-up in about 6 weeks (around 5/3/2018) for med check; 6 months WELLNESS EXAM.     Patient's Medications   New Prescriptions    ESCITALOPRAM OXALATE (LEXAPRO) 10 MG TABLET    Take 1 tablet (10 mg total) by mouth once daily.   Previous Medications    No medications on file   Modified Medications    Modified Medication Previous Medication    ATORVASTATIN (LIPITOR) 10 MG TABLET atorvastatin (LIPITOR) 10 MG tablet       Take 1 tablet (10 mg total) by mouth once daily.    Take 1 tablet (10 mg total) by mouth once daily.     PANTOPRAZOLE (PROTONIX) 40 MG TABLET pantoprazole (PROTONIX) 40 MG tablet       Take 1 tablet (40 mg total) by mouth once daily.    Take 40 mg by mouth once daily.   Discontinued Medications    No medications on file

## 2018-05-18 RX ORDER — ATORVASTATIN CALCIUM 10 MG/1
10 TABLET, FILM COATED ORAL DAILY
Qty: 90 TABLET | Refills: 1 | OUTPATIENT
Start: 2018-05-18

## 2018-07-03 ENCOUNTER — TELEPHONE (OUTPATIENT)
Dept: FAMILY MEDICINE | Facility: CLINIC | Age: 58
End: 2018-07-03

## 2018-07-03 NOTE — TELEPHONE ENCOUNTER
----- Message from Rafa Kelly sent at 7/3/2018  8:43 AM CDT -----  Contact: [phone]  Patient would like to be seen sooner than the next available appointment for a sinus and ear infection. Please call.

## 2018-07-03 NOTE — TELEPHONE ENCOUNTER
low called pt inform that [facility] is full, found appointment for 4 with another doctor pt said no wanted [facility] to squeeze her in but low said again [facility] had no opening so pt said will go to urgent care.

## 2018-09-05 DIAGNOSIS — E78.49 OTHER HYPERLIPIDEMIA: Primary | ICD-10-CM

## 2018-09-05 RX ORDER — ATORVASTATIN CALCIUM 10 MG/1
10 TABLET, FILM COATED ORAL DAILY
Qty: 90 TABLET | Refills: 0 | Status: SHIPPED | OUTPATIENT
Start: 2018-09-05 | End: 2019-02-14 | Stop reason: SDUPTHER

## 2019-02-14 DIAGNOSIS — E78.49 OTHER HYPERLIPIDEMIA: ICD-10-CM

## 2019-02-14 RX ORDER — ATORVASTATIN CALCIUM 10 MG/1
10 TABLET, FILM COATED ORAL DAILY
Qty: 30 TABLET | Refills: 0 | Status: SHIPPED | OUTPATIENT
Start: 2019-02-14 | End: 2019-02-20 | Stop reason: SDUPTHER

## 2019-02-14 NOTE — TELEPHONE ENCOUNTER
Advise patient that she is OVERDUE for fasting labs and WELLNESS EXAM and follow up since September 2018 - I will fill 30  Days so she does not run out of medication but she needs to schedule fasting labs and WELLNESS EXAM now.

## 2019-02-14 NOTE — TELEPHONE ENCOUNTER
----- Message from Racheal Kevin sent at 2/14/2019 12:01 PM CST -----  Contact: self  / [phone]  Patient is requesting a refill on the below. Please advise      atorvastatin (LIPITOR) 10 MG tablet      Pharmacy     [id number] - [place], [place] - [id number]

## 2019-02-20 ENCOUNTER — OFFICE VISIT (OUTPATIENT)
Dept: FAMILY MEDICINE | Facility: CLINIC | Age: 59
End: 2019-02-20
Payer: COMMERCIAL

## 2019-02-20 ENCOUNTER — TELEPHONE (OUTPATIENT)
Dept: FAMILY MEDICINE | Facility: CLINIC | Age: 59
End: 2019-02-20

## 2019-02-20 VITALS
TEMPERATURE: 98 F | HEIGHT: 61 IN | OXYGEN SATURATION: 97 % | SYSTOLIC BLOOD PRESSURE: 120 MMHG | HEART RATE: 69 BPM | WEIGHT: 170.06 LBS | RESPIRATION RATE: 16 BRPM | BODY MASS INDEX: 32.11 KG/M2 | DIASTOLIC BLOOD PRESSURE: 88 MMHG

## 2019-02-20 DIAGNOSIS — H69.93 EUSTACHIAN TUBE DYSFUNCTION, BILATERAL: ICD-10-CM

## 2019-02-20 DIAGNOSIS — Z00.00 ANNUAL PHYSICAL EXAM: Primary | ICD-10-CM

## 2019-02-20 DIAGNOSIS — Z12.39 BREAST CANCER SCREENING: ICD-10-CM

## 2019-02-20 DIAGNOSIS — J30.89 NON-SEASONAL ALLERGIC RHINITIS, UNSPECIFIED TRIGGER: ICD-10-CM

## 2019-02-20 DIAGNOSIS — E78.49 OTHER HYPERLIPIDEMIA: ICD-10-CM

## 2019-02-20 DIAGNOSIS — K21.9 GASTROESOPHAGEAL REFLUX DISEASE, ESOPHAGITIS PRESENCE NOT SPECIFIED: ICD-10-CM

## 2019-02-20 PROCEDURE — 99999 PR PBB SHADOW E&M-EST. PATIENT-LVL IV: ICD-10-PCS | Mod: PBBFAC,,, | Performed by: NURSE PRACTITIONER

## 2019-02-20 PROCEDURE — 99999 PR PBB SHADOW E&M-EST. PATIENT-LVL IV: CPT | Mod: PBBFAC,,, | Performed by: NURSE PRACTITIONER

## 2019-02-20 PROCEDURE — 99396 PR PREVENTIVE VISIT,EST,40-64: ICD-10-PCS | Mod: S$GLB,,, | Performed by: NURSE PRACTITIONER

## 2019-02-20 PROCEDURE — 99396 PREV VISIT EST AGE 40-64: CPT | Mod: S$GLB,,, | Performed by: NURSE PRACTITIONER

## 2019-02-20 RX ORDER — ATORVASTATIN CALCIUM 10 MG/1
10 TABLET, FILM COATED ORAL DAILY
Qty: 90 TABLET | Refills: 1 | Status: SHIPPED | OUTPATIENT
Start: 2019-02-20 | End: 2019-08-21 | Stop reason: SDUPTHER

## 2019-02-20 RX ORDER — ATORVASTATIN CALCIUM 10 MG/1
TABLET, FILM COATED ORAL
COMMUNITY
Start: 2015-06-25 | End: 2019-02-20 | Stop reason: SDUPTHER

## 2019-02-20 RX ORDER — PANTOPRAZOLE SODIUM 40 MG/1
40 TABLET, DELAYED RELEASE ORAL DAILY
Qty: 90 TABLET | Refills: 1 | Status: SHIPPED | OUTPATIENT
Start: 2019-02-20 | End: 2019-08-21 | Stop reason: ALTCHOICE

## 2019-02-20 RX ORDER — PANTOPRAZOLE SODIUM 40 MG/1
TABLET, DELAYED RELEASE ORAL
COMMUNITY
End: 2019-02-20 | Stop reason: SDUPTHER

## 2019-02-20 NOTE — PATIENT INSTRUCTIONS

## 2019-02-20 NOTE — PROGRESS NOTES
Subjective:       Patient ID: Hallie Blake is a 58 y.o. female.    Chief Complaint: Annual Exam    Patient is a 58 year old white female with Hyperlipidemia, Chronic GERD and chronic allergies that is here today for annual physical exam with fasting lab results.    Patient has Hyperlipidemia and takes Atorvastatin 10 mg daily.  Cholesterol levels are controlled on present medication.      Patient has chronic GERD.  She takes Protonix as needed.  She had her last EGD in July 2016 that showed grade B reflux esophagitis and a small hiatus hernia.    Patient has chronic allergies.  Admits to not using flonase daily for maintenance therapy.  Patient complains of chronic postnasal drip and now complains of pain to ears radiating down lateral neck on and off.    Wellness Labs:  -  CBC WNL  -  CMP WNL  -  Cholesterol WNL  -  TSH WNL    Health Maintenance:  -  PATIENT NONCOMPLIANCE:  I have ordered mammogram several times and patient does not go have testing done.  I again gave order for patient to have mammogram done at [facility] at her request today.  -  Patient also overdue for PAP - states she will schedule with Dr. Matthew Abreu  -  Patient refuses flu vaccine.      Otalgia    There is pain in both ears. This is a recurrent problem. The current episode started in the past 7 days. The problem occurs constantly. The problem has been waxing and waning. There has been no fever. Associated symptoms include neck pain and rhinorrhea. Pertinent negatives include no abdominal pain, coughing, diarrhea, ear discharge, headaches, rash, sore throat or vomiting. Associated symptoms comments: Ear pain radiating - Going down bilateral lateral neck pain.  Has chronic allergies and postnasal drip.  Popping/clicking sound to ears.. She has tried nothing for the symptoms. Her past medical history is significant for a chronic ear infection.     Component      Latest Ref Rng & Units 2/15/2019 3/14/2018 7/6/2017   WBC      3.90 - 12.70 K/uL 6.42   6.22   RBC      4.00 - 5.40 M/uL 4.22  4.33   Hemoglobin      12.0 - 16.0 g/dL 13.2  13.2   Hematocrit      37.0 - 48.5 % 40.2  41.0   MCV      82 - 98 fL 95  95   MCH      27.0 - 31.0 pg 31.3 (H)  30.5   MCHC      32.0 - 36.0 g/dL 32.8  32.2   RDW      11.5 - 14.5 % 13.5  13.8   Platelets      150 - 350 K/uL 237  227   MPV      9.2 - 12.9 fL 9.9  10.2   Gran # (ANC)      1.8 - 7.7 K/uL 3.5  3.2   Lymph #      1.0 - 4.8 K/uL 2.3  2.2   Mono #      0.3 - 1.0 K/uL 0.5  0.5   Eos #      0.0 - 0.5 K/uL 0.2  0.3   Baso #      0.00 - 0.20 K/uL 0.02  0.01   Gran%      38.0 - 73.0 % 54.4  52.1   Lymph%      18.0 - 48.0 % 35.8  35.0   Mono%      4.0 - 15.0 % 7.2  8.5   Eosinophil%      0.0 - 8.0 % 2.3  4.2   Basophil%      0.0 - 1.9 % 0.3  0.2   Differential Method       Automated  Automated   Sodium      136 - 145 mmol/L 145 143 143   Potassium      3.5 - 5.1 mmol/L 4.2 4.0 4.2   Chloride      95 - 110 mmol/L 108 105 107   CO2      23 - 29 mmol/L 24 28 26   Glucose      70 - 110 mg/dL 92 94 88   BUN, Bld      7 - 17 mg/dL 14 14 16   Creatinine      0.50 - 1.40 mg/dL 0.64 0.65 0.65   Calcium      8.7 - 10.5 mg/dL 9.6 9.7 9.6   Total Protein      6.0 - 8.4 g/dL 7.8 7.7 7.4   Albumin      3.5 - 5.2 g/dL 4.5 4.5 4.2   Total Bilirubin      0.1 - 1.0 mg/dL 0.5 0.4 0.6   Alkaline Phosphatase      38 - 126 U/L 120 113 111   AST      15 - 46 U/L 27 29 30   ALT      10 - 44 U/L 27 38 42   Anion Gap      8 - 16 mmol/L 13 10 10   eGFR if African American      >60 mL/min/1.73 m:2 >60.0 >60.0 >60.0   eGFR if non African American      >60 mL/min/1.73 m:2 >60.0 >60.0 >60.0   Cholesterol      120 - 199 mg/dL 157 207 (H) 169   Triglycerides      30 - 150 mg/dL 111 156 (H) 116   HDL      40 - 75 mg/dL 44 51 48   LDL Cholesterol      63.0 - 159.0 mg/dL 90.8 124.8 97.8   HDL/Chol Ratio      20.0 - 50.0 % 28.0 24.6 28.4   Total Cholesterol/HDL Ratio      2.0 - 5.0 3.6 4.1 3.5   Non-HDL Cholesterol      mg/dL 113 156 121   TSH      0.400 - 4.000  uIU/mL 1.970  1.520     Current Outpatient Medications   Medication Sig Dispense Refill    atorvastatin (LIPITOR) 10 MG tablet Take 1 tablet (10 mg total) by mouth once daily. 30 tablet 0    pantoprazole (PROTONIX) 40 MG tablet Take 1 tablet (40 mg total) by mouth once daily. 90 tablet 1    fluticasone (FLONASE ALLERGY RELIEF) 50 mcg/actuation nasal spray use 2 squirts in the nostrils daily 16 g 0     No current facility-administered medications for this visit.        Past Medical History:   Diagnosis Date    GERD (gastroesophageal reflux disease)     Hyperlipidemia     Kidney stone        Past Surgical History:   Procedure Laterality Date    COLONOSCOPY  2016    normal - repeat in 10 year - Dr. Damon Contreras = [facility]    CYSTOSCOPY W/ LASER LITHOTRIPSY      ESOPHAGOGASTRODUODENOSCOPY  2016    Dr. Contreras - Grade B reflux esophagitis and small hiatus hernia    TONSILLECTOMY         Family History   Problem Relation Age of Onset    Heart disease Mother         passed age 83 heart attack    Diabetes Mother     Diabetes Father     Heart disease Father         CABG around age 65;  heart failure at 68    No Known Problems Sister     Hypertension Brother     Hyperlipidemia Brother     Diabetes Brother     Thyroid disease Sister     Hyperlipidemia Sister     Diabetes Brother     Thyroid disease Brother     Myasthenia gravis Brother     No Known Problems Daughter     No Known Problems Daughter        Social History     Socioeconomic History    Marital status:      Spouse name: None    Number of children: None    Years of education: None    Highest education level: None   Social Needs    Financial resource strain: None    Food insecurity - worry: None    Food insecurity - inability: None    Transportation needs - medical: None    Transportation needs - non-medical: None   Occupational History    Occupation:    Tobacco Use    Smoking  "status: Never Smoker    Smokeless tobacco: Never Used   Substance and Sexual Activity    Alcohol use: Yes     Comment: social    Drug use: No    Sexual activity: None   Other Topics Concern    None   Social History Narrative    None       Review of Systems   Constitutional: Negative for appetite change, chills, fatigue, fever and unexpected weight change.   HENT: Positive for congestion, ear pain, postnasal drip and rhinorrhea. Negative for ear discharge, mouth sores, nosebleeds, sinus pressure, sneezing, sore throat, trouble swallowing and voice change.    Eyes: Negative for photophobia, pain, discharge, redness, itching and visual disturbance.   Respiratory: Negative for cough, chest tightness and shortness of breath.    Cardiovascular: Negative for chest pain, palpitations and leg swelling.   Gastrointestinal: Negative for abdominal pain, blood in stool, constipation, diarrhea, nausea and vomiting.   Genitourinary: Negative for dysuria, frequency, hematuria and urgency.   Musculoskeletal: Positive for neck pain. Negative for arthralgias, back pain, joint swelling and myalgias.   Skin: Negative for color change and rash.   Allergic/Immunologic: Negative for immunocompromised state.   Neurological: Negative for dizziness, seizures, syncope, weakness and headaches.   Hematological: Negative for adenopathy. Does not bruise/bleed easily.   Psychiatric/Behavioral: Negative for agitation, dysphoric mood, sleep disturbance and suicidal ideas. The patient is not nervous/anxious.          Objective:     Vitals:    02/20/19 1012   BP: 120/88   BP Location: Right arm   Patient Position: Sitting   BP Method: Large (Manual)   Pulse: 69   Resp: 16   Temp: 98.3 °F (36.8 °C)   TempSrc: Oral   SpO2: 97%   Weight: 77.2 kg (170 lb 1.4 oz)   Height: 5' 1" (1.549 m)          Physical Exam   Constitutional: She is oriented to person, place, and time. She appears well-developed and well-nourished. No distress.   + obesity with " Body mass index is 32.14 kg/m².           HENT:   Head: Normocephalic and atraumatic.   Right Ear: External ear normal. Tympanic membrane is scarred and perforated.   Left Ear: External ear normal. Tympanic membrane is scarred and perforated.   Ears:    Nose: Mucosal edema present.   Mouth/Throat: Oropharynx is clear and moist. No oropharyngeal exudate.   + old perforations to six o'clock position on bilateral TMs.  Patient states she does not think she had tubes in ears before.  + scattered scarring as well to bilateral TMs.   Eyes: EOM are normal. Pupils are equal, round, and reactive to light.   Neck: Normal range of motion. Neck supple. No tracheal deviation present. No thyromegaly present.   Cardiovascular: Normal rate, regular rhythm and normal heart sounds.   No murmur heard.  Pulmonary/Chest: Effort normal and breath sounds normal. No respiratory distress.   Abdominal: Soft. Bowel sounds are normal. She exhibits no distension and no mass. There is no tenderness. There is no rebound and no guarding. No hernia.   Musculoskeletal: Normal range of motion. She exhibits no edema.   Lymphadenopathy:     She has no cervical adenopathy.   Neurological: She is alert and oriented to person, place, and time. No cranial nerve deficit. Coordination normal.   Skin: Skin is warm and dry. No rash noted. She is not diaphoretic.   Psychiatric: She has a normal mood and affect.         Assessment:         ICD-10-CM ICD-9-CM   1. Annual physical exam Z00.00 V70.0   2. Other hyperlipidemia E78.49 272.4   3. Gastroesophageal reflux disease, esophagitis presence not specified K21.9 530.81   4. Breast cancer screening Z12.31 V76.10   5. Non-seasonal allergic rhinitis, unspecified trigger J30.89 477.8   6. Eustachian tube dysfunction, bilateral H69.83 381.81       Plan:       Annual physical exam  Health Maintenance:  -  PATIENT NONCOMPLIANCE:  I have ordered mammogram several times and patient does not go have testing done.  I again  gave order for patient to have mammogram done at [facility] at her request today.  -  Patient also overdue for PAP - states she will schedule with Dr. Matthew Abreu  -  Patient refuses flu vaccine.  Health Maintenance Summary     Pap Smear with HPV Cotest Postponed 4/3/2019 Originally 11/2/2018. Patient Scheduled    Done 11/2/2015    Influenza Vaccine Postponed 4/10/2019 Originally 8/1/2018. Patient Refused    Declined 10/31/2017     Declined 4/25/2017    Mammogram Postponed 4/13/2019 Originally 11/2/2017. Patient Scheduled    Done 11/2/2015    Lipid Panel Next Due 2/15/2024     Done 2/15/2019 LIPID PANEL    Done 3/14/2018 LIPID PANEL Abnormal     Done 7/6/2017 LIPID PANEL    Done 7/11/2016 LIPID PANEL Abnormal     Done 6/22/2015 LIPID PANEL Abnormal    TETANUS VACCINE Next Due 7/26/2026     Done 7/26/2016 Imm Admin: Tdap    Done 8/5/2002 Imm Admin: Tdap   Colonoscopy Next Due 9/16/2026     Done 9/16/2016     Declined 7/26/2016 states she will schedule with Dr. Contreras   Hepatitis C Screening This plan is no longer active.     Done 7/11/2016 HEPATITIS C ANTIBODY         Other hyperlipidemia  -  Controlled on present medication - recheck in 6 months.  -     atorvastatin (LIPITOR) 10 MG tablet; Take 1 tablet (10 mg total) by mouth once daily.  Dispense: 90 tablet; Refill: 1  -     Comprehensive metabolic panel; Future; Expected date: 08/20/2019  -     Lipid panel; Future; Expected date: 08/20/2019    Gastroesophageal reflux disease, esophagitis presence not specified  -  Controlled on present medication as needed  -     pantoprazole (PROTONIX) 40 MG tablet; Take 1 tablet (40 mg total) by mouth once daily.  Dispense: 90 tablet; Refill: 1    Breast cancer screening  -  States she will schedule with [facility] - printed out order given.  -     Mammo Digital Screening Bilat; Future; Expected date: 02/20/2019    Non-seasonal allergic rhinitis, unspecified trigger  -  Use flonase and Allegra D  -  See handouts    Eustachian tube  dysfunction, bilateral  -  flonase every day.  Zyrtec or allegra D as needed.  Ibuprofen as needed.      Follow-up in about 6 months (around 8/20/2019) for labs and med check.     Patient's Medications   New Prescriptions    No medications on file   Previous Medications    FLUTICASONE (FLONASE ALLERGY RELIEF) 50 MCG/ACTUATION NASAL SPRAY    use 2 squirts in the nostrils daily   Modified Medications    Modified Medication Previous Medication    ATORVASTATIN (LIPITOR) 10 MG TABLET atorvastatin (LIPITOR) 10 MG tablet       Take 1 tablet (10 mg total) by mouth once daily.    Take 1 tablet (10 mg total) by mouth once daily.    PANTOPRAZOLE (PROTONIX) 40 MG TABLET pantoprazole (PROTONIX) 40 MG tablet       Take 1 tablet (40 mg total) by mouth once daily.    Take 1 tablet (40 mg total) by mouth once daily.   Discontinued Medications    AMOXICILLIN-CLAVULANATE 875-125MG (AUGMENTIN) 875-125 MG PER TABLET    take 1 tablet by mouth twice a day    ATORVASTATIN (LIPITOR) 10 MG TABLET    atorvastatin 10 mg tablet    ESCITALOPRAM OXALATE (LEXAPRO) 10 MG TABLET    Take 1 tablet (10 mg total) by mouth once daily.    PANTOPRAZOLE (PROTONIX) 40 MG TABLET    pantoprazole 40 mg tablet,delayed release

## 2019-08-01 ENCOUNTER — TELEPHONE (OUTPATIENT)
Dept: FAMILY MEDICINE | Facility: CLINIC | Age: 59
End: 2019-08-01

## 2019-08-21 ENCOUNTER — OFFICE VISIT (OUTPATIENT)
Dept: FAMILY MEDICINE | Facility: CLINIC | Age: 59
End: 2019-08-21
Payer: COMMERCIAL

## 2019-08-21 VITALS
HEART RATE: 72 BPM | HEIGHT: 61 IN | SYSTOLIC BLOOD PRESSURE: 126 MMHG | WEIGHT: 173.19 LBS | TEMPERATURE: 98 F | BODY MASS INDEX: 32.7 KG/M2 | DIASTOLIC BLOOD PRESSURE: 86 MMHG | OXYGEN SATURATION: 97 % | RESPIRATION RATE: 16 BRPM

## 2019-08-21 DIAGNOSIS — E78.49 OTHER HYPERLIPIDEMIA: Primary | ICD-10-CM

## 2019-08-21 DIAGNOSIS — K21.9 GASTROESOPHAGEAL REFLUX DISEASE, ESOPHAGITIS PRESENCE NOT SPECIFIED: ICD-10-CM

## 2019-08-21 DIAGNOSIS — Z23 NEED FOR SHINGLES VACCINE: ICD-10-CM

## 2019-08-21 DIAGNOSIS — Z12.39 BREAST CANCER SCREENING: ICD-10-CM

## 2019-08-21 DIAGNOSIS — Z13.29 THYROID DISORDER SCREEN: ICD-10-CM

## 2019-08-21 DIAGNOSIS — J30.89 NON-SEASONAL ALLERGIC RHINITIS, UNSPECIFIED TRIGGER: ICD-10-CM

## 2019-08-21 DIAGNOSIS — Z13.1 DIABETES MELLITUS SCREENING: ICD-10-CM

## 2019-08-21 DIAGNOSIS — Z13.0 SCREENING, ANEMIA, DEFICIENCY, IRON: ICD-10-CM

## 2019-08-21 PROCEDURE — 3008F BODY MASS INDEX DOCD: CPT | Mod: CPTII,S$GLB,, | Performed by: NURSE PRACTITIONER

## 2019-08-21 PROCEDURE — 99999 PR PBB SHADOW E&M-EST. PATIENT-LVL IV: CPT | Mod: PBBFAC,,, | Performed by: NURSE PRACTITIONER

## 2019-08-21 PROCEDURE — 99999 PR PBB SHADOW E&M-EST. PATIENT-LVL IV: ICD-10-PCS | Mod: PBBFAC,,, | Performed by: NURSE PRACTITIONER

## 2019-08-21 PROCEDURE — 99214 PR OFFICE/OUTPT VISIT, EST, LEVL IV, 30-39 MIN: ICD-10-PCS | Mod: S$GLB,,, | Performed by: NURSE PRACTITIONER

## 2019-08-21 PROCEDURE — 99214 OFFICE O/P EST MOD 30 MIN: CPT | Mod: S$GLB,,, | Performed by: NURSE PRACTITIONER

## 2019-08-21 PROCEDURE — 3008F PR BODY MASS INDEX (BMI) DOCUMENTED: ICD-10-PCS | Mod: CPTII,S$GLB,, | Performed by: NURSE PRACTITIONER

## 2019-08-21 RX ORDER — ATORVASTATIN CALCIUM 10 MG/1
10 TABLET, FILM COATED ORAL DAILY
Qty: 90 TABLET | Refills: 1 | Status: SHIPPED | OUTPATIENT
Start: 2019-08-21 | End: 2020-03-31 | Stop reason: SDUPTHER

## 2019-08-21 NOTE — PROGRESS NOTES
Subjective:       Patient ID: Hallie Blake is a 58 y.o. female.    Chief Complaint: Follow-up (6 months F/U)    Patient is a 58 year old white female with Hyperlipidemia, Chronic GERD and chronic allergies that is here today for 6 month follow up with fasting lab results.     Patient has Hyperlipidemia and takes Atorvastatin 10 mg daily.  Cholesterol levels are controlled on present medication.       Patient has chronic GERD.  She takes Protonix as needed.  She had her last EGD in July 2016 that showed grade B reflux esophagitis and a small hiatus hernia. She reports taking Protonix every other day.  She has never trying change to H2 Blocker for management.  Advised patient that longterm PPI use has been associated with bone density loss so I would like us to at least try to change over from PPI to H2 Blocker to see if we can make an effective change.     Patient has chronic allergies.  Admits to not using flonase daily for maintenance therapy.  Patient complains of chronic postnasal drip.  She reports taking Benadryl prn.        Component      Latest Ref Rng & Units 8/2/2019 2/15/2019 3/14/2018 7/6/2017   Sodium      136 - 145 mmol/L 140 145 143 143   Potassium      3.5 - 5.1 mmol/L 4.0 4.2 4.0 4.2   Chloride      95 - 110 mmol/L 108 108 105 107   CO2      23 - 29 mmol/L 25 24 28 26   Glucose      70 - 110 mg/dL 96 92 94 88   BUN, Bld      7 - 17 mg/dL 15 14 14 16   Creatinine      0.50 - 1.40 mg/dL 0.59 0.64 0.65 0.65   Calcium      8.7 - 10.5 mg/dL 9.2 9.6 9.7 9.6   PROTEIN TOTAL      6.0 - 8.4 g/dL 7.2 7.8 7.7 7.4   Albumin      3.5 - 5.2 g/dL 4.3 4.5 4.5 4.2   BILIRUBIN TOTAL      0.1 - 1.0 mg/dL 0.6 0.5 0.4 0.6   Alkaline Phosphatase      38 - 126 U/L 104 120 113 111   AST      15 - 46 U/L 26 27 29 30   ALT      10 - 44 U/L 26 27 38 42   Anion Gap      8 - 16 mmol/L 7 (L) 13 10 10   eGFR if African American      >60 mL/min/1.73 m:2 >60.0 >60.0 >60.0 >60.0   eGFR if non African American      >60 mL/min/1.73 m:2  >60.0 >60.0 >60.0 >60.0   Cholesterol      120 - 199 mg/dL 163 157 207 (H) 169   Triglycerides      30 - 150 mg/dL 100 111 156 (H) 116   HDL      40 - 75 mg/dL 50 44 51 48   LDL Cholesterol External      63.0 - 159.0 mg/dL 93.0 90.8 124.8 97.8   Hdl/Cholesterol Ratio      20.0 - 50.0 % 30.7 28.0 24.6 28.4   Total Cholesterol/HDL Ratio      2.0 - 5.0 3.3 3.6 4.1 3.5   Non-HDL Cholesterol      mg/dL 113 113 156 121     Current Outpatient Medications   Medication Sig Dispense Refill    atorvastatin (LIPITOR) 10 MG tablet Take 1 tablet (10 mg total) by mouth once daily. 90 tablet 1    fluticasone (FLONASE ALLERGY RELIEF) 50 mcg/actuation nasal spray use 2 squirts in the nostrils daily 16 g 0    pantoprazole (PROTONIX) 40 MG tablet Take 1 tablet (40 mg total) by mouth once daily. 90 tablet 1     No current facility-administered medications for this visit.        Past Medical History:   Diagnosis Date    GERD (gastroesophageal reflux disease)     Hyperlipidemia     Kidney stone        Past Surgical History:   Procedure Laterality Date    COLONOSCOPY  2016    normal - repeat in 10 year - Dr. Damon Contreras = [facility]    CYSTOSCOPY W/ LASER LITHOTRIPSY      ESOPHAGOGASTRODUODENOSCOPY  2016    Dr. Contreras - Grade B reflux esophagitis and small hiatus hernia    TONSILLECTOMY         Family History   Problem Relation Age of Onset    Heart disease Mother         passed age 83 heart attack    Diabetes Mother     Diabetes Father     Heart disease Father         CABG around age 65;  heart failure at 68    No Known Problems Sister     Hypertension Brother     Hyperlipidemia Brother     Diabetes Brother     Thyroid disease Sister     Hyperlipidemia Sister     Diabetes Brother     Thyroid disease Brother     Myasthenia gravis Brother     No Known Problems Daughter     No Known Problems Daughter        Social History     Socioeconomic History    Marital status:       Spouse name: Not on file    Number of children: Not on file    Years of education: Not on file    Highest education level: Not on file   Occupational History    Occupation:    Social Needs    Financial resource strain: Not on file    Food insecurity:     Worry: Not on file     Inability: Not on file    Transportation needs:     Medical: Not on file     Non-medical: Not on file   Tobacco Use    Smoking status: Never Smoker    Smokeless tobacco: Never Used   Substance and Sexual Activity    Alcohol use: Yes     Comment: social    Drug use: No    Sexual activity: Not on file   Lifestyle    Physical activity:     Days per week: Not on file     Minutes per session: Not on file    Stress: Not on file   Relationships    Social connections:     Talks on phone: Not on file     Gets together: Not on file     Attends Voodoo service: Not on file     Active member of club or organization: Not on file     Attends meetings of clubs or organizations: Not on file     Relationship status: Not on file   Other Topics Concern    Not on file   Social History Narrative    Not on file       Review of Systems   Constitutional: Negative for appetite change, chills, fatigue, fever and unexpected weight change.   HENT: Positive for congestion and postnasal drip. Negative for ear pain, mouth sores, nosebleeds, rhinorrhea, sinus pressure, sneezing, sore throat, trouble swallowing and voice change.    Eyes: Negative for photophobia, pain, discharge, redness, itching and visual disturbance.   Respiratory: Negative for cough, chest tightness and shortness of breath.    Cardiovascular: Negative for chest pain, palpitations and leg swelling.   Gastrointestinal: Negative for abdominal pain, blood in stool, constipation, diarrhea, nausea and vomiting.   Genitourinary: Negative for dysuria, frequency, hematuria and urgency.   Musculoskeletal: Negative for arthralgias, back pain, joint swelling and myalgias.   Skin: Negative  "for color change and rash.   Allergic/Immunologic: Negative for immunocompromised state.   Neurological: Negative for dizziness, seizures, syncope, weakness and headaches.   Hematological: Negative for adenopathy. Does not bruise/bleed easily.   Psychiatric/Behavioral: Negative for agitation, dysphoric mood, sleep disturbance and suicidal ideas. The patient is not nervous/anxious.          Objective:     Vitals:    08/21/19 0938 08/21/19 1000   BP: 124/86 126/86   BP Location: Left arm    Patient Position: Sitting    BP Method: Large (Manual)    Pulse: 72    Resp: 16    Temp: 97.7 °F (36.5 °C)    TempSrc: Oral    SpO2: 97%    Weight: 78.5 kg (173 lb 2.7 oz)    Height: 5' 1" (1.549 m)           Physical Exam   Constitutional: She is oriented to person, place, and time. She appears well-developed and well-nourished. No distress.   + obesity with Body mass index is 32.72 kg/m².         HENT:   Head: Normocephalic and atraumatic.   Right Ear: External ear normal. Tympanic membrane is scarred and perforated.   Left Ear: External ear normal. Tympanic membrane is scarred and perforated.   Ears:    Nose: Mucosal edema present.   Mouth/Throat: Oropharynx is clear and moist. No oropharyngeal exudate.   + old perforations appeared to heal over to six o'clock position on bilateral TMs.  Patient states she does not think she had tubes in ears before.  + scattered scarring as well to bilateral TMs.   Eyes: Pupils are equal, round, and reactive to light. EOM are normal.   Neck: Normal range of motion. Neck supple. No tracheal deviation present. No thyromegaly present.   Cardiovascular: Normal rate, regular rhythm and normal heart sounds.   No murmur heard.  Pulmonary/Chest: Effort normal and breath sounds normal. No respiratory distress.   Abdominal: Soft. Bowel sounds are normal. She exhibits no distension and no mass. There is no tenderness. There is no rebound and no guarding. No hernia.   Musculoskeletal: Normal range of " motion. She exhibits no edema.   Lymphadenopathy:     She has no cervical adenopathy.   Neurological: She is alert and oriented to person, place, and time. No cranial nerve deficit. Coordination normal.   Skin: Skin is warm and dry. No rash noted. She is not diaphoretic.   Psychiatric: She has a normal mood and affect.         Assessment:         ICD-10-CM ICD-9-CM   1. Other hyperlipidemia E78.49 272.4   2. Gastroesophageal reflux disease, esophagitis presence not specified K21.9 530.81   3. Non-seasonal allergic rhinitis, unspecified trigger J30.89 477.8   4. Breast cancer screening Z12.31 V76.10   5. Need for shingles vaccine Z23 V04.89   6. Screening, anemia, deficiency, iron Z13.0 V78.0   7. Thyroid disorder screen Z13.29 V77.0   8. Diabetes mellitus screening Z13.1 V77.1       Plan:       Other hyperlipidemia  -  Continue Atorvastatin at present dose - recheck in 6 months.  -     atorvastatin (LIPITOR) 10 MG tablet; Take 1 tablet (10 mg total) by mouth once daily.  Dispense: 90 tablet; Refill: 1  -     Lipid panel; Future; Expected date: 08/21/2019    Gastroesophageal reflux disease, esophagitis presence not specified  -  Stop the Protonix and we will try changing to Zantac 300 mg nightly.  If ineffective - contact me and let me know.  -     ranitidine (ZANTAC) 300 MG tablet; Take 1 tablet (300 mg total) by mouth every evening.  Dispense: 90 tablet; Refill: 1    Non-seasonal allergic rhinitis, unspecified trigger  -  Continue flonase and OTC antihistamine    Breast cancer screening  -     Mammo Digital Screening Bilat w/ [place]; Future; Expected date: 08/21/2019    Need for shingles vaccine    Screening, anemia, deficiency, iron  -     CBC auto differential; Future; Expected date: 08/21/2019    Thyroid disorder screen  -     TSH; Future; Expected date: 08/21/2019    Diabetes mellitus screening  -     Comprehensive metabolic panel; Future; Expected date: 08/21/2019      Follow up in about 6 months (around  2/21/2020) for fasting labs and WELLNESS EXAM.     Patient's Medications   New Prescriptions    RANITIDINE (ZANTAC) 300 MG TABLET    Take 1 tablet (300 mg total) by mouth every evening.    VARICELLA-ZOSTER GE-AS01B, PF, (SHINGRIX, PF,) 50 MCG/0.5 ML INJECTION    Inject 0.5 mLs into the muscle once   Previous Medications    FLUTICASONE (FLONASE ALLERGY RELIEF) 50 MCG/ACTUATION NASAL SPRAY    use 2 squirts in the nostrils daily   Modified Medications    Modified Medication Previous Medication    ATORVASTATIN (LIPITOR) 10 MG TABLET atorvastatin (LIPITOR) 10 MG tablet       Take 1 tablet (10 mg total) by mouth once daily.    Take 1 tablet (10 mg total) by mouth once daily.   Discontinued Medications    PANTOPRAZOLE (PROTONIX) 40 MG TABLET    Take 1 tablet (40 mg total) by mouth once daily.

## 2019-08-26 ENCOUNTER — IMMUNIZATION (OUTPATIENT)
Dept: PHARMACY | Facility: CLINIC | Age: 59
End: 2019-08-26
Payer: COMMERCIAL

## 2019-08-28 ENCOUNTER — HOSPITAL ENCOUNTER (OUTPATIENT)
Dept: RADIOLOGY | Facility: HOSPITAL | Age: 59
Discharge: HOME | End: 2019-08-28
Attending: NURSE PRACTITIONER
Payer: COMMERCIAL

## 2019-08-28 DIAGNOSIS — Z12.39 BREAST CANCER SCREENING: ICD-10-CM

## 2019-08-28 PROCEDURE — 77067 MAMMO DIGITAL SCREENING BILAT WITH TOMOSYNTHESIS_CAD: ICD-10-PCS | Mod: 26,,, | Performed by: RADIOLOGY

## 2019-08-28 PROCEDURE — 77067 SCR MAMMO BI INCL CAD: CPT | Mod: TC

## 2019-08-28 PROCEDURE — 77067 SCR MAMMO BI INCL CAD: CPT | Mod: 26,,, | Performed by: RADIOLOGY

## 2019-08-28 PROCEDURE — 77063 BREAST TOMOSYNTHESIS BI: CPT | Mod: 26,,, | Performed by: RADIOLOGY

## 2019-08-28 PROCEDURE — 77063 MAMMO DIGITAL SCREENING BILAT WITH TOMOSYNTHESIS_CAD: ICD-10-PCS | Mod: 26,,, | Performed by: RADIOLOGY

## 2019-08-29 ENCOUNTER — TELEPHONE (OUTPATIENT)
Dept: FAMILY MEDICINE | Facility: CLINIC | Age: 59
End: 2019-08-29

## 2019-08-29 NOTE — TELEPHONE ENCOUNTER
----- Message from Mandi Kurtz MD sent at 8/29/2019 10:52 AM CDT -----  Please let patient know her mammogram results are normal.

## 2019-09-25 RX ORDER — PANTOPRAZOLE SODIUM 40 MG/1
40 TABLET, DELAYED RELEASE ORAL DAILY
Qty: 90 TABLET | Refills: 3 | Status: SHIPPED | OUTPATIENT
Start: 2019-09-25 | End: 2020-09-29 | Stop reason: SDUPTHER

## 2019-09-25 RX ORDER — PANTOPRAZOLE SODIUM 40 MG/1
40 TABLET, DELAYED RELEASE ORAL DAILY
COMMUNITY
End: 2019-09-25 | Stop reason: SDUPTHER

## 2019-09-25 NOTE — TELEPHONE ENCOUNTER
----- Message from Mariely Warren sent at 9/25/2019 11:51 AM CDT -----  No. [phone]    Patient needs Pantoprazole 40mg, 1 daily, #90 called into [facility] in [place].   Pantoprazole works better.

## 2020-02-28 ENCOUNTER — IMMUNIZATION (OUTPATIENT)
Dept: PHARMACY | Facility: CLINIC | Age: 60
End: 2020-02-28
Payer: COMMERCIAL

## 2020-03-31 ENCOUNTER — OFFICE VISIT (OUTPATIENT)
Dept: FAMILY MEDICINE | Facility: CLINIC | Age: 60
End: 2020-03-31
Payer: COMMERCIAL

## 2020-03-31 ENCOUNTER — TELEPHONE (OUTPATIENT)
Dept: FAMILY MEDICINE | Facility: CLINIC | Age: 60
End: 2020-03-31

## 2020-03-31 DIAGNOSIS — Z00.00 ANNUAL PHYSICAL EXAM: Primary | ICD-10-CM

## 2020-03-31 DIAGNOSIS — Z11.4 SCREENING FOR HIV WITHOUT PRESENCE OF RISK FACTORS: ICD-10-CM

## 2020-03-31 DIAGNOSIS — E78.49 OTHER HYPERLIPIDEMIA: ICD-10-CM

## 2020-03-31 DIAGNOSIS — J30.89 NON-SEASONAL ALLERGIC RHINITIS, UNSPECIFIED TRIGGER: ICD-10-CM

## 2020-03-31 DIAGNOSIS — K21.9 CHRONIC GERD: ICD-10-CM

## 2020-03-31 PROCEDURE — 99396 PR PREVENTIVE VISIT,EST,40-64: ICD-10-PCS | Mod: 95,,, | Performed by: NURSE PRACTITIONER

## 2020-03-31 PROCEDURE — 99396 PREV VISIT EST AGE 40-64: CPT | Mod: 95,,, | Performed by: NURSE PRACTITIONER

## 2020-03-31 RX ORDER — ATORVASTATIN CALCIUM 10 MG/1
10 TABLET, FILM COATED ORAL DAILY
Qty: 90 TABLET | Refills: 1 | Status: SHIPPED | OUTPATIENT
Start: 2020-03-31 | End: 2020-09-29 | Stop reason: DRUGHIGH

## 2020-03-31 NOTE — TELEPHONE ENCOUNTER
Spoke with patient said she will call back when it get closer to schedule her 6 months F/U labs and appt.

## 2020-03-31 NOTE — PROGRESS NOTES
Subjective:       Patient ID: Hallie Blake is a 59 y.o. female.    Chief Complaint: Annual Exam    The patient location is: home  The chief complaint leading to consultation is: annual physical exam  Visit type: Virtual visit with synchronous audio and video  Total time spent with patient: 20  Each patient to whom he or she provides medical services by telemedicine is:  (1) informed of the relationship between the physician and patient and the respective role of any other health care provider with respect to management of the patient; and (2) notified that he or she may decline to receive medical services by telemedicine and may withdraw from such care at any time.    Notes:   Patient is a 59 year old white female with Hyperlipidemia, Chronic GERD and chronic allergies that has virtual visit today for annual physical exam with fasting lab results.     Patient has Hyperlipidemia and takes Atorvastatin 10 mg daily.  Cholesterol levels are controlled on present medication.  Her LDL is now 100.2 - been slowly rising over past year.  Recommended low fat diet and will recheck in 6 months.     Patient has chronic GERD.  She takes Protonix as needed.  She had her last EGD in July 2016 that showed grade B reflux esophagitis and a small hiatus hernia. She reports taking Protonix around twice weekly.     Patient has chronic allergies. Takes Flonase prn.    Wellness Labs:  -  CBC within normal limits  -  CMP within normal limit  -  cholesterol controlled on present medication  -  thyroid screening is normal    Health maintenance:  -  will do HIV screen with next wellness exam  -  states she did not have a flu vaccine with the past flu season.    Component      Latest Ref Rng & Units 3/11/2020 8/2/2019 2/15/2019 3/14/2018   WBC      3.90 - 12.70 K/uL 6.74  6.42    RBC      4.00 - 5.40 M/uL 4.20  4.22    Hemoglobin      12.0 - 16.0 g/dL 13.0  13.2    Hematocrit      37.0 - 48.5 % 40.6  40.2    MCV      82 - 98 fL 97  95    MCH       27.0 - 31.0 pg 31.0  31.3 (H)    MCHC      32.0 - 36.0 g/dL 32.0  32.8    RDW      11.5 - 14.5 % 13.5  13.5    Platelets      150 - 350 K/uL 241  237    MPV      9.2 - 12.9 fL 9.9  9.9    Immature Granulocytes      0.0 - 0.5 % 0.7 (H)      Gran # (ANC)      1.8 - 7.7 K/uL 3.3  3.5    Immature Grans (Abs)      0.00 - 0.04 K/uL 0.05 (H)      Lymph #      1.0 - 4.8 K/uL 2.5  2.3    Mono #      0.3 - 1.0 K/uL 0.6  0.5    Eos #      0.0 - 0.5 K/uL 0.3  0.2    Baso #      0.00 - 0.20 K/uL 0.03  0.02    nRBC      0 /100 WBC 0      Gran%      38.0 - 73.0 % 49.6  54.4    Lymph%      18.0 - 48.0 % 37.1  35.8    Mono%      4.0 - 15.0 % 8.3  7.2    Eosinophil%      0.0 - 8.0 % 3.9  2.3    Basophil%      0.0 - 1.9 % 0.4  0.3    Differential Method       Automated  Automated    Sodium      136 - 145 mmol/L 144 140 145 143   Potassium      3.5 - 5.1 mmol/L 4.3 4.0 4.2 4.0   Chloride      95 - 110 mmol/L 110 108 108 105   CO2      23 - 29 mmol/L 26 25 24 28   Glucose      70 - 110 mg/dL 101 96 92 94   BUN, Bld      7 - 17 mg/dL 17 15 14 14   Creatinine      0.50 - 1.40 mg/dL 0.58 0.59 0.64 0.65   Calcium      8.7 - 10.5 mg/dL 9.1 9.2 9.6 9.7   PROTEIN TOTAL      6.0 - 8.4 g/dL 7.6 7.2 7.8 7.7   Albumin      3.5 - 5.2 g/dL 4.6 4.3 4.5 4.5   BILIRUBIN TOTAL      0.1 - 1.0 mg/dL 0.5 0.6 0.5 0.4   Alkaline Phosphatase      38 - 126 U/L 120 104 120 113   AST      15 - 46 U/L 31 26 27 29   ALT      10 - 44 U/L 31 26 27 38   Anion Gap      8 - 16 mmol/L 8 7 (L) 13 10   eGFR if African American      >60 mL/min/1.73 m:2 >60.0 >60.0 >60.0 >60.0   eGFR if non African American      >60 mL/min/1.73 m:2 >60.0 >60.0 >60.0 >60.0   Cholesterol      120 - 199 mg/dL 173 163 157 207 (H)   Triglycerides      30 - 150 mg/dL 79 100 111 156 (H)   HDL      40 - 75 mg/dL 57 50 44 51   LDL Cholesterol External      63.0 - 159.0 mg/dL 100.2 93.0 90.8 124.8   Hdl/Cholesterol Ratio      20.0 - 50.0 % 32.9 30.7 28.0 24.6   Total Cholesterol/HDL Ratio      2.0  - 5.0 3.0 3.3 3.6 4.1   Non-HDL Cholesterol      mg/dL 116 113 113 156   TSH      0.400 - 4.000 uIU/mL 1.610  1.970      Current Outpatient Medications   Medication Sig Dispense Refill    atorvastatin (LIPITOR) 10 MG tablet Take 1 tablet (10 mg total) by mouth once daily. 90 tablet 1    fluticasone (FLONASE ALLERGY RELIEF) 50 mcg/actuation nasal spray use 2 squirts in the nostrils daily 16 g 0    HYDROcodone-acetaminophen (NORCO) 5-325 mg per tablet Take 1 tablet by mouth every 4 to 6 hours as needed for pain. 12 tablet 0    pantoprazole (PROTONIX) 40 MG tablet Take 1 tablet (40 mg total) by mouth once daily. 90 tablet 3     No current facility-administered medications for this visit.        Past Medical History:   Diagnosis Date    GERD (gastroesophageal reflux disease)     Hyperlipidemia     Kidney stone        Past Surgical History:   Procedure Laterality Date    COLONOSCOPY  2016    normal - repeat in 10 year - Dr. Damon Contreras = [facility]    CYSTOSCOPY W/ LASER LITHOTRIPSY      ESOPHAGOGASTRODUODENOSCOPY  2016    Dr. Contreras - Grade B reflux esophagitis and small hiatus hernia    TONSILLECTOMY         Family History   Problem Relation Age of Onset    Heart disease Mother         passed age 83 heart attack    Diabetes Mother     Diabetes Father     Heart disease Father         CABG around age 65;  heart failure at 68    No Known Problems Sister     Hypertension Brother     Hyperlipidemia Brother     Diabetes Brother     Thyroid disease Sister     Hyperlipidemia Sister     Diabetes Brother     Thyroid disease Brother     Myasthenia gravis Brother     No Known Problems Daughter     No Known Problems Daughter        Social History     Socioeconomic History    Marital status:      Spouse name: Not on file    Number of children: Not on file    Years of education: Not on file    Highest education level: Not on file   Occupational History     Occupation:    Social Needs    Financial resource strain: Not on file    Food insecurity:     Worry: Not on file     Inability: Not on file    Transportation needs:     Medical: Not on file     Non-medical: Not on file   Tobacco Use    Smoking status: Never Smoker    Smokeless tobacco: Never Used   Substance and Sexual Activity    Alcohol use: Yes     Comment: social    Drug use: No    Sexual activity: Not on file   Lifestyle    Physical activity:     Days per week: Not on file     Minutes per session: Not on file    Stress: Not on file   Relationships    Social connections:     Talks on phone: Not on file     Gets together: Not on file     Attends Rastafari service: Not on file     Active member of club or organization: Not on file     Attends meetings of clubs or organizations: Not on file     Relationship status: Not on file   Other Topics Concern    Not on file   Social History Narrative    Not on file       Review of Systems   Constitutional: Negative for appetite change, chills, fatigue, fever and unexpected weight change.   HENT: Negative for congestion, ear pain, mouth sores, nosebleeds, postnasal drip, rhinorrhea, sinus pressure, sneezing, sore throat, trouble swallowing and voice change.    Eyes: Negative for photophobia, pain, discharge, redness, itching and visual disturbance.   Respiratory: Negative for cough, chest tightness and shortness of breath.    Cardiovascular: Negative for chest pain, palpitations and leg swelling.   Gastrointestinal: Negative for abdominal pain, blood in stool, constipation, diarrhea, nausea and vomiting.   Genitourinary: Negative for dysuria, frequency, hematuria and urgency.   Musculoskeletal: Negative for arthralgias, back pain, joint swelling and myalgias.   Skin: Negative for color change and rash.   Allergic/Immunologic: Negative for immunocompromised state.   Neurological: Negative for dizziness, seizures, syncope, weakness and headaches.    Hematological: Negative for adenopathy. Does not bruise/bleed easily.   Psychiatric/Behavioral: Negative for agitation, dysphoric mood, sleep disturbance and suicidal ideas. The patient is not nervous/anxious.          Objective:            Physical Exam   Constitutional: She is oriented to person, place, and time. She appears well-developed and well-nourished. No distress.   HENT:   Head: Normocephalic and atraumatic.   Eyes: Pupils are equal, round, and reactive to light. Conjunctivae and EOM are normal. Right eye exhibits no discharge. Left eye exhibits no discharge. No scleral icterus.   Neck: Normal range of motion.   Pulmonary/Chest: Effort normal. No respiratory distress.   Neurological: She is alert and oriented to person, place, and time.   Skin: She is not diaphoretic.   Psychiatric: She has a normal mood and affect. Her behavior is normal. Judgment and thought content normal.         Assessment:         ICD-10-CM ICD-9-CM   1. Annual physical exam Z00.00 V70.0   2. Other hyperlipidemia E78.49 272.4   3. Chronic GERD K21.9 530.81   4. Non-seasonal allergic rhinitis, unspecified trigger J30.89 477.8       Plan:       Annual physical exam  Health Maintenance Summary     Influenza Vaccine Postponed 10/1/2020 Originally 9/1/2019. Patient Refused   HIV Screening Postponed 4/1/2021 Originally 10[birth year]19/1975. Patient Scheduled   Mammogram Next Due 8/28/2021     Done 8/28/2019 MAMMO DIGITAL SCREENING BILAT WITH TOMOSYNTHESIS_CAD    Done 11/2/2015    Pap Smear with HPV Cotest Next Due 2/20/2022     Done 2/20/2019 [facility] PAP SMEAR    Done 11/2/2015    Lipid Panel Next Due 3/11/2025     Done 3/11/2020 LIPID PANEL    Done 8/2/2019 LIPID PANEL    Done 2/15/2019 LIPID PANEL    Done 3/14/2018 LIPID PANEL     Done 7/6/2017 LIPID PANEL    Patient has more history with this topic...   TETANUS VACCINE Next Due 7/26/2026     Done 7/26/2016 Imm Admin: Tdap    Done 8/5/2002 Imm Admin: Tdap   Colonoscopy Next Due 9/16/2026     Done  9/16/2016     Declined 7/26/2016 states she will schedule with Dr. Contreras   Hepatitis C Screening This plan is no longer active.     Done 7/11/2016 HEPATITIS C ANTIBODY   Shingles Vaccine This plan is no longer active.     Done 2/27/2020 Imm Admin: Zoster Recombinant    Done 8/21/2019 Imm Admin: Zoster Recombinant         Other hyperlipidemia  -  Continue Atorvastatin 10 mg daily - stricter lifestyle modifications and recheck in 6 months. If LDL still > 100 - will  Increase medication dose.  -     atorvastatin (LIPITOR) 10 MG tablet; Take 1 tablet (10 mg total) by mouth once daily.  Dispense: 90 tablet; Refill: 1  -     Comprehensive metabolic panel; Future; Expected date: 03/31/2020  -     Lipid panel; Future; Expected date: 03/31/2020    Chronic GERD  -  Takes Protonix prn    Non-seasonal allergic rhinitis, unspecified trigger  -  Uses Flonase as needed.      Follow up in about 6 months (around 9/30/2020) for fasting labs and follow up visit..     Patient's Medications   New Prescriptions    No medications on file   Previous Medications    FLUTICASONE (FLONASE ALLERGY RELIEF) 50 MCG/ACTUATION NASAL SPRAY    use 2 squirts in the nostrils daily    PANTOPRAZOLE (PROTONIX) 40 MG TABLET    Take 1 tablet (40 mg total) by mouth once daily.   Modified Medications    Modified Medication Previous Medication    ATORVASTATIN (LIPITOR) 10 MG TABLET atorvastatin (LIPITOR) 10 MG tablet       Take 1 tablet (10 mg total) by mouth once daily.    Take 1 tablet (10 mg total) by mouth once daily.   Discontinued Medications    HYDROCODONE-ACETAMINOPHEN (NORCO) 5-325 MG PER TABLET    Take 1 tablet by mouth every 4 to 6 hours as needed for pain.

## 2020-07-29 ENCOUNTER — TELEPHONE (OUTPATIENT)
Dept: SPORTS MEDICINE | Facility: CLINIC | Age: 60
End: 2020-07-29

## 2020-07-29 NOTE — TELEPHONE ENCOUNTER
TORIN informing the patient that her appointment has been cancelled. Dr. Rider will be out of clinic until September.

## 2020-07-31 ENCOUNTER — HOSPITAL ENCOUNTER (OUTPATIENT)
Dept: RADIOLOGY | Facility: HOSPITAL | Age: 60
Discharge: HOME | End: 2020-07-31
Attending: PHYSICIAN ASSISTANT
Payer: COMMERCIAL

## 2020-07-31 ENCOUNTER — OFFICE VISIT (OUTPATIENT)
Dept: SPORTS MEDICINE | Facility: CLINIC | Age: 60
End: 2020-07-31
Payer: COMMERCIAL

## 2020-07-31 VITALS
WEIGHT: 173 LBS | SYSTOLIC BLOOD PRESSURE: 130 MMHG | HEIGHT: 61 IN | HEART RATE: 76 BPM | DIASTOLIC BLOOD PRESSURE: 91 MMHG | BODY MASS INDEX: 32.66 KG/M2

## 2020-07-31 DIAGNOSIS — M25.511 RIGHT SHOULDER PAIN, UNSPECIFIED CHRONICITY: ICD-10-CM

## 2020-07-31 DIAGNOSIS — M25.511 RIGHT SHOULDER PAIN, UNSPECIFIED CHRONICITY: Primary | ICD-10-CM

## 2020-07-31 DIAGNOSIS — M75.31 CALCIFIC TENDINITIS OF RIGHT SHOULDER: ICD-10-CM

## 2020-07-31 PROCEDURE — 73030 X-RAY EXAM OF SHOULDER: CPT | Mod: 26,RT,, | Performed by: RADIOLOGY

## 2020-07-31 PROCEDURE — 99213 PR OFFICE/OUTPT VISIT, EST, LEVL III, 20-29 MIN: ICD-10-PCS | Mod: S$GLB,,, | Performed by: PHYSICIAN ASSISTANT

## 2020-07-31 PROCEDURE — 99213 OFFICE O/P EST LOW 20 MIN: CPT | Mod: S$GLB,,, | Performed by: PHYSICIAN ASSISTANT

## 2020-07-31 PROCEDURE — 3008F BODY MASS INDEX DOCD: CPT | Mod: CPTII,S$GLB,, | Performed by: PHYSICIAN ASSISTANT

## 2020-07-31 PROCEDURE — 99999 PR PBB SHADOW E&M-EST. PATIENT-LVL III: CPT | Mod: PBBFAC,,, | Performed by: PHYSICIAN ASSISTANT

## 2020-07-31 PROCEDURE — 73030 X-RAY EXAM OF SHOULDER: CPT | Mod: TC,RT

## 2020-07-31 PROCEDURE — 99999 PR PBB SHADOW E&M-EST. PATIENT-LVL III: ICD-10-PCS | Mod: PBBFAC,,, | Performed by: PHYSICIAN ASSISTANT

## 2020-07-31 PROCEDURE — 73030 XR SHOULDER COMPLETE 2 OR MORE VIEWS RIGHT: ICD-10-PCS | Mod: 26,RT,, | Performed by: RADIOLOGY

## 2020-07-31 PROCEDURE — 3008F PR BODY MASS INDEX (BMI) DOCUMENTED: ICD-10-PCS | Mod: CPTII,S$GLB,, | Performed by: PHYSICIAN ASSISTANT

## 2020-07-31 NOTE — PROGRESS NOTES
CC: RIGHT shoulder pain     59 y.o. Female with a history of right shoulder pain who presents for initial evaluation as a new patient.  Patient states the pain started around December without specific mechanism of injury or trauma.  She states this has been a lingering problem that was worsened about 3 months ago when she attempted to lift something heavy.  She reports the pain is primarily localized to the anterior aspect of the right shoulder and occasionally radiates to the biceps region.  There is no associated numbness or tingling.  She states she feels her right arm is slightly weaker than the left.  She is right-hand dominant.  Thus far treatment has included over-the-counter Aleve which has provided some relief.  Patient states the only injury she can recall was when she was a young girl she fell off of monkey bars and dislocated her right shoulder.  She never sought treatment for this and states that it resolved on its own.  No prior shoulder surgeries.    She reports that the pain and weakness is worse with overhead activity. It also bothers her at night.    Is affecting ADLs.  Pain is  5/10 at it's worst.      Past Medical History:   Diagnosis Date    GERD (gastroesophageal reflux disease)     Hyperlipidemia     Kidney stone        Past Surgical History:   Procedure Laterality Date    COLONOSCOPY  2016    normal - repeat in 10 year - Dr. Damon Contreras = [facility]    CYSTOSCOPY W/ LASER LITHOTRIPSY      ESOPHAGOGASTRODUODENOSCOPY  2016    Dr. Contreras - Grade B reflux esophagitis and small hiatus hernia    TONSILLECTOMY         Family History   Problem Relation Age of Onset    Heart disease Mother         passed age 83 heart attack    Diabetes Mother     Diabetes Father     Heart disease Father         CABG around age 65;  heart failure at 68    No Known Problems Sister     Hypertension Brother     Hyperlipidemia Brother     Diabetes Brother     Thyroid  "disease Sister     Hyperlipidemia Sister     Diabetes Brother     Thyroid disease Brother     Myasthenia gravis Brother     No Known Problems Daughter     No Known Problems Daughter          Current Outpatient Medications:     atorvastatin (LIPITOR) 10 MG tablet, Take 1 tablet (10 mg total) by mouth once daily., Disp: 90 tablet, Rfl: 1    fluticasone (FLONASE ALLERGY RELIEF) 50 mcg/actuation nasal spray, use 2 squirts in the nostrils daily, Disp: 16 g, Rfl: 0    pantoprazole (PROTONIX) 40 MG tablet, Take 1 tablet (40 mg total) by mouth once daily., Disp: 90 tablet, Rfl: 3    Review of patient's allergies indicates:  No Known Allergies       REVIEW OF SYSTEMS:  Constitution: Negative. Negative for chills, fever and night sweats.   HENT: Negative for congestion and headaches.    Eyes: Negative for blurred vision, left vision loss and right vision loss.   Cardiovascular: Negative for chest pain and syncope.   Respiratory: Negative for cough and shortness of breath.    Endocrine: Negative for polydipsia, polyphagia and polyuria.   Hematologic/Lymphatic: Negative for bleeding problem. Does not bruise/bleed easily.   Skin: Negative for dry skin, itching and rash.   Musculoskeletal: Negative for falls.  Positive for right shoulder pain and muscle weakness.   Gastrointestinal: Negative for abdominal pain and bowel incontinence.   Genitourinary: Negative for bladder incontinence and nocturia.   Neurological: Negative for disturbances in coordination, loss of balance and seizures.   Psychiatric/Behavioral: Negative for depression. The patient does not have insomnia.    Allergic/Immunologic: Negative for hives and persistent infections.      PHYSICAL EXAMINATION:  Vitals:  BP (!) 130/91   Pulse 76   Ht 5' 1" (1.549 m)   Wt 78.5 kg (173 lb)   LMP  (LMP Unknown)   BMI 32.69 kg/m²    General: The patient is alert and oriented x 3.  Mood is pleasant.  Observation of ears, eyes and nose reveal no gross abnormalities.  " No labored breathing observed.  Gait is coordinated. Patient can toe walk and heel walk without difficulty.      RIGHT SHOULDER / UPPER EXTREMITY EXAM    OBSERVATION:     Swelling  none  Deformity  none   Discoloration  none   Scapular winging none   Scars   none  Atrophy  none    TENDERNESS / CREPITUS (T/C):          T/C      T/C   Clavicle   -/-  SUPRAspinatus    +/-     AC Jt.    -/-  INFRAspinatus  -/-    SC Jt.    -/-  Deltoid    -/-      G. Tuberosity  -/-  LH BICEP groove  +/-   Acromion:  -/-  Midline Neck   -/-     Scapular Spine -/-  Trapezium   -/-   SMA Scapula  -/-  GH jt. line - post  -/-     Scapulothoracic  -/-         ROM: (* = with pain)  Left shoulder   Right shoulder        AROM (PROM)   AROM (PROM)   FE    170° (175°)     170° (175°)     ER at 0°    60°  (65°)    50°  (60°*)      IR (spine level)   T10     T10*    STRENGTH: (* = with pain) Left shoulder   Right shoulder   SCAPTION   5/5    4+/5    IR    5/5    5/5   ER    5/5    4+/5   BICEPS   5/5    4+/5   Deltoid    5/5    5/5     SIGNS:  Painful side       NEER   -    OTIMS  +    CAMPO   +    SPEEDS  neg     DROP ARM   -   BELLY PRESS neg   Superior escape none    LIFT-OFF  neg   X-Body ADD    neg    MOVING VALGUS neg        STABILITY TESTING    Left shoulder   Right shoulder    Translation     Anterior  up face     up face    Posterior  up face    up face    Sulcus   < 10mm    < 10 mm     Signs   Apprehension   neg      neg       Relocation   no change     no change      Jerk test  neg     neg    EXTREMITY NEURO-VASCULAR EXAM:    Sensation grossly intact to light touch all dermatomal regions.    DTR 2+ Biceps, Triceps, BR and Negative Byrons sign   Grossly intact motor function at Elbow, Wrist and Hand   Distal pulses radial and ulnar 2+, brisk cap refill, symmetric.      NECK:  Painless FROM and spinous processes non-tender. Negative Spurlings sign.      OTHER FINDINGS:  N/A scapular dyskinesia    XRAYS right shoulder  (07/31/2020):  FINDINGS:  Three views: There is calcific chronic rotator cuff tendinitis.  There is DJD.  No fracture dislocation bone destruction seen.          ASSESSMENT:     Right shoulder pain  Calcific rotator cuff tendinitis, right shoulder  Mild DJD, right shoulder      PLAN:      1. I made the decision to obtain old records of the patient including previous notes and imaging. I independently reviewed and interpreted the radiographs and/or MRIs today as well as prior imaging. Reviewed radiograph results with patient in detail.    2. HEP 51310 - Drew Buck instructed and demonstrated a shoulder/periscapular HEP. The patient then demonstrated understanding of exercises and proper technique. This program was performed for 5 minutes.     3.  Discuss other conservative management options such as ice/heat, topical analgesics, over-the-counter anti-inflammatories or Tylenol, formal physical therapy, injections, and activity modification.    4.  Follow-up as needed in the future.      All questions were answered, patient will contact us for questions or concerns in the interim.      Medical Dictation software was used during the dictation of portions or the entirety of this medical record.  Phonetic or grammatic errors may exist due to the use of this software. For clarification, refer to the author of the document.

## 2020-09-18 ENCOUNTER — TELEPHONE (OUTPATIENT)
Dept: SPORTS MEDICINE | Facility: CLINIC | Age: 60
End: 2020-09-18

## 2020-09-18 DIAGNOSIS — M25.511 RIGHT SHOULDER PAIN, UNSPECIFIED CHRONICITY: Primary | ICD-10-CM

## 2020-09-18 NOTE — TELEPHONE ENCOUNTER
----- Message from Shanelle Bellamy sent at 9/18/2020  3:41 PM CDT -----  Contact: [phone]  Rex Shankar,    You saw this patient recently.  Would you be willing to order MRI for her?      Thanks, Shanelle  ----- Message -----  From: Yashira Mitchell MA  Sent: 9/18/2020   3:26 PM CDT  To: [facility] Staff    [phone]   Pt states she continues to have right shoulder pain and wanted to know if Dr Rider would order a MRI

## 2020-09-21 ENCOUNTER — TELEPHONE (OUTPATIENT)
Dept: SPORTS MEDICINE | Facility: CLINIC | Age: 60
End: 2020-09-21

## 2020-09-21 NOTE — TELEPHONE ENCOUNTER
Spoke to patient and scheduled her MRI. Patient disconnected the call before her f/u appointment was given to her.

## 2020-09-21 NOTE — TELEPHONE ENCOUNTER
----- Message from Shanelle Bellamy sent at 9/19/2020  7:08 AM CDT -----  Contact: [phone]  Carmela,    Can you call her Monday and schedule MRI and follow up with Amy?    ----- Message -----  From: Raad Lock PA-C  Sent: 9/18/2020   4:06 PM CDT  To: Shanelle Bellamy    Sure thing, order placed.  ----- Message -----  From: Shanelle Bellamy  Sent: 9/18/2020   3:41 PM CDT  To: Raad Lock PA-C, [facility] Staff    Rex Shankar,    You saw this patient recently.  Would you be willing to order MRI for her?      ThanksShanelle  ----- Message -----  From: Yashira Mitchell MA  Sent: 9/18/2020   3:26 PM CDT  To: [facility] Staff    [phone]   Pt states she continues to have right shoulder pain and wanted to know if Dr Rider would order a MRI

## 2020-09-23 ENCOUNTER — TELEPHONE (OUTPATIENT)
Dept: FAMILY MEDICINE | Facility: CLINIC | Age: 60
End: 2020-09-23

## 2020-09-23 NOTE — TELEPHONE ENCOUNTER
I do not need to add anything - just need to schedule the labs ordered and then follow up appt for resuts - thank you for checking with me.

## 2020-09-23 NOTE — TELEPHONE ENCOUNTER
----- Message from Jeronimo Becker sent at 9/23/2020  9:36 AM CDT -----  Regarding: lab orders  Type:  Needs Medical Advice    Who Called: patient   Reason: patient would like lab orders to be sent in for her 6 month blood work check up.    Would the patient rather a call back or a response via [facility]? Call back   Best Call Back Number: [phone]  Additional Information: n/a

## 2020-09-24 ENCOUNTER — HOSPITAL ENCOUNTER (OUTPATIENT)
Dept: RADIOLOGY | Facility: HOSPITAL | Age: 60
Discharge: HOME | End: 2020-09-24
Attending: PHYSICIAN ASSISTANT
Payer: COMMERCIAL

## 2020-09-24 DIAGNOSIS — M25.511 RIGHT SHOULDER PAIN, UNSPECIFIED CHRONICITY: ICD-10-CM

## 2020-09-24 PROCEDURE — 73221 MRI JOINT UPR EXTREM W/O DYE: CPT | Mod: 26,RT,, | Performed by: RADIOLOGY

## 2020-09-24 PROCEDURE — 73221 MRI JOINT UPR EXTREM W/O DYE: CPT | Mod: TC,RT

## 2020-09-24 PROCEDURE — 73221 MRI SHOULDER WITHOUT CONTRAST RIGHT: ICD-10-PCS | Mod: 26,RT,, | Performed by: RADIOLOGY

## 2020-09-29 ENCOUNTER — OFFICE VISIT (OUTPATIENT)
Dept: FAMILY MEDICINE | Facility: CLINIC | Age: 60
End: 2020-09-29
Payer: COMMERCIAL

## 2020-09-29 VITALS
TEMPERATURE: 98 F | SYSTOLIC BLOOD PRESSURE: 132 MMHG | RESPIRATION RATE: 18 BRPM | WEIGHT: 175.69 LBS | HEIGHT: 61 IN | HEART RATE: 67 BPM | OXYGEN SATURATION: 98 % | BODY MASS INDEX: 33.17 KG/M2 | DIASTOLIC BLOOD PRESSURE: 88 MMHG

## 2020-09-29 DIAGNOSIS — E78.49 OTHER HYPERLIPIDEMIA: Primary | ICD-10-CM

## 2020-09-29 DIAGNOSIS — F43.0 STRESS REACTION CAUSING MIXED DISTURBANCE OF EMOTION AND CONDUCT: ICD-10-CM

## 2020-09-29 DIAGNOSIS — R03.0 TEMPORARY HIGH BLOOD PRESSURE: ICD-10-CM

## 2020-09-29 DIAGNOSIS — K21.9 CHRONIC GERD: ICD-10-CM

## 2020-09-29 DIAGNOSIS — Z12.39 BREAST CANCER SCREENING: ICD-10-CM

## 2020-09-29 PROCEDURE — 3008F PR BODY MASS INDEX (BMI) DOCUMENTED: ICD-10-PCS | Mod: CPTII,S$GLB,, | Performed by: NURSE PRACTITIONER

## 2020-09-29 PROCEDURE — 99214 OFFICE O/P EST MOD 30 MIN: CPT | Mod: S$GLB,,, | Performed by: NURSE PRACTITIONER

## 2020-09-29 PROCEDURE — 99999 PR PBB SHADOW E&M-EST. PATIENT-LVL V: CPT | Mod: PBBFAC,,, | Performed by: NURSE PRACTITIONER

## 2020-09-29 PROCEDURE — 99214 PR OFFICE/OUTPT VISIT, EST, LEVL IV, 30-39 MIN: ICD-10-PCS | Mod: S$GLB,,, | Performed by: NURSE PRACTITIONER

## 2020-09-29 PROCEDURE — 99999 PR PBB SHADOW E&M-EST. PATIENT-LVL V: ICD-10-PCS | Mod: PBBFAC,,, | Performed by: NURSE PRACTITIONER

## 2020-09-29 PROCEDURE — 3008F BODY MASS INDEX DOCD: CPT | Mod: CPTII,S$GLB,, | Performed by: NURSE PRACTITIONER

## 2020-09-29 RX ORDER — ATORVASTATIN CALCIUM 20 MG/1
20 TABLET, FILM COATED ORAL DAILY
Qty: 90 TABLET | Refills: 0 | Status: SHIPPED | OUTPATIENT
Start: 2020-09-29 | End: 2021-02-09

## 2020-09-29 RX ORDER — PANTOPRAZOLE SODIUM 40 MG/1
40 TABLET, DELAYED RELEASE ORAL DAILY
Qty: 90 TABLET | Refills: 0 | Status: SHIPPED | OUTPATIENT
Start: 2020-09-29 | End: 2021-02-19 | Stop reason: SDUPTHER

## 2020-09-29 RX ORDER — ATORVASTATIN CALCIUM 10 MG/1
10 TABLET, FILM COATED ORAL DAILY
Qty: 90 TABLET | Refills: 1 | Status: CANCELLED | OUTPATIENT
Start: 2020-09-29

## 2020-09-29 NOTE — PROGRESS NOTES
"Subjective:       Patient ID: Hallie Blake is a 59 y.o. female.    Chief Complaint: Follow-up (6 months F/U) and Medication Refill    Patient is a 59 year old white female with Hyperlipidemia, Chronic GERD and chronic allergies that is here today for follow up with fasting lab results.     Patient has Hyperlipidemia and takes Atorvastatin 10 mg daily.  Cholesterol levels are uncontrolled on present medication.  Her LDL remains > 100 - will adjust medication and recheck in 3 months.    Patient has temporary high blood pressure.  She reports her blood pressure at home is < 130/80.  Will have patient monitor blood pressure at home next 3 months and come in with home BP cuff and readings to further evaluate.  /88   Pulse 67   Temp 97.5 °F (36.4 °C) (Oral)   Resp 18   Ht 5' 1" (1.549 m)   Wt 79.7 kg (175 lb 11.3 oz)   LMP  (LMP Unknown)   SpO2 98%   BMI 33.20 kg/m²      Patient has chronic GERD.  She takes Protonix as needed.  She had her last EGD in July 2016 that showed grade B reflux esophagitis and a small hiatus hernia. She reports taking Protonix around twice weekly.     Patient has chronic allergies. Takes Flonase prn.    Patient reports increased stressors at work.  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  States she feels like they keep putting added responsibilities and pressures on her such as making her take daily temperatures.  Explained to patient that daily temperature checks due to COVID is a SAFETY measure and a company policy.  Patient states well I stopped doing my daily temperature checks, etc.  Explained to patient that if it is office policy and she is noncompliant, she could get fired.  Patient reports feeling overwhelmed, not wanted there, anxious, depressed.  She is requesting time off from work.  Advised patient that she would need evaluation from Psychiatry for evaluation and only " they can determine if she is mentally unfit to work or needs time off, etc.  Gave patient the number to call to [facility] in [place] as well as [facility] to set up appt for evaluation and management of acute stress reaction.     Health maintenance:  -  Declined flu vaccine.      Component      Latest Ref Rng & Units 9/25/2020 3/11/2020 8/2/2019 2/15/2019   Sodium      136 - 145 mmol/L 145 144 140 145   Potassium      3.5 - 5.1 mmol/L 4.6 4.3 4.0 4.2   Chloride      95 - 110 mmol/L 108 110 108 108   CO2      23 - 29 mmol/L 28 26 25 24   Glucose      70 - 110 mg/dL 102 101 96 92   BUN, Bld      7 - 17 mg/dL 15 17 15 14   Creatinine      0.50 - 1.40 mg/dL 0.73 0.58 0.59 0.64   Calcium      8.7 - 10.5 mg/dL 9.8 9.1 9.2 9.6   PROTEIN TOTAL      6.0 - 8.4 g/dL 7.6 7.6 7.2 7.8   Albumin      3.5 - 5.2 g/dL 4.4 4.6 4.3 4.5   BILIRUBIN TOTAL      0.1 - 1.0 mg/dL 0.5 0.5 0.6 0.5   Alkaline Phosphatase      38 - 126 U/L 105 120 104 120   AST      15 - 46 U/L 34 31 26 27   ALT      10 - 44 U/L 30 31 26 27   Anion Gap      8 - 16 mmol/L 9 8 7 (L) 13   eGFR if African American      >60 mL/min/1.73 m:2 >60.0 >60.0 >60.0 >60.0   eGFR if non African American      >60 mL/min/1.73 m:2 >60.0 >60.0 >60.0 >60.0   Cholesterol      120 - 199 mg/dL 189 173 163 157   Triglycerides      30 - 150 mg/dL 104 79 100 111   HDL      40 - 75 mg/dL 53 57 50 44   LDL Cholesterol External      63.0 - 159.0 mg/dL 115.2 100.2 93.0 90.8   Hdl/Cholesterol Ratio      20.0 - 50.0 % 28.0 32.9 30.7 28.0   Total Cholesterol/HDL Ratio      2.0 - 5.0 3.6 3.0 3.3 3.6   Non-HDL Cholesterol      mg/dL 136 116 113 113   HIV 1/2 Ag/Ab      Negative Negative        Current Outpatient Medications   Medication Sig Dispense Refill    atorvastatin (LIPITOR) 10 MG tablet Take 1 tablet (10 mg total) by mouth once daily. 90 tablet 1    fluticasone (FLONASE ALLERGY RELIEF) 50 mcg/actuation nasal spray use 2 squirts  in the nostrils daily 16 g 0    pantoprazole (PROTONIX) 40 MG tablet Take 1 tablet (40 mg total) by mouth once daily. 90 tablet 3     No current facility-administered medications for this visit.        Past Medical History:   Diagnosis Date    GERD (gastroesophageal reflux disease)     Hyperlipidemia     Kidney stone        Past Surgical History:   Procedure Laterality Date    COLONOSCOPY  2016    normal - repeat in 10 year - Dr. aDmon [facility]ras = [facility]    CYSTOSCOPY W/ LASER LITHOTRIPSY      ESOPHAGOGASTRODUODENOSCOPY  2016    Dr. Contreras - Grade B reflux esophagitis and small hiatus hernia    TONSILLECTOMY         Family History   Problem Relation Age of Onset    Heart disease Mother         passed age 83 heart attack    Diabetes Mother     Diabetes Father     Heart disease Father         CABG around age 65;  heart failure at 68    No Known Problems Sister     Hypertension Brother     Hyperlipidemia Brother     Diabetes Brother     Thyroid disease Sister     Hyperlipidemia Sister     Diabetes Brother     Thyroid disease Brother     Myasthenia gravis Brother     No Known Problems Daughter     No Known Problems Daughter        Social History     Socioeconomic History    Marital status:      Spouse name: Not on file    Number of children: Not on file    Years of education: Not on file    Highest education level: Not on file   Occupational History    Occupation:    Social Needs    Financial resource strain: Not on file    Food insecurity     Worry: Not on file     Inability: Not on file    Transportation needs     Medical: Not on file     Non-medical: Not on file   Tobacco Use    Smoking status: Never Smoker    Smokeless tobacco: Never Used   Substance and Sexual Activity    Alcohol use: Yes     Comment: social    Drug use: No    Sexual activity: Not on file   Lifestyle    Physical activity     Days per week: Not on file      Minutes per session: Not on file    Stress: Not on file   Relationships    Social connections     Talks on phone: Not on file     Gets together: Not on file     Attends Zoroastrianism service: Not on file     Active member of club or organization: Not on file     Attends meetings of clubs or organizations: Not on file     Relationship status: Not on file   Other Topics Concern    Not on file   Social History Narrative    Not on file       Review of Systems   Constitutional: Negative for appetite change, chills, fatigue, fever and unexpected weight change.   HENT: Negative for congestion, ear pain, mouth sores, nosebleeds, postnasal drip, rhinorrhea, sinus pressure, sneezing, sore throat, trouble swallowing and voice change.    Eyes: Negative for photophobia, pain, discharge, redness, itching and visual disturbance.   Respiratory: Negative for cough, chest tightness and shortness of breath.    Cardiovascular: Negative for chest pain, palpitations and leg swelling.   Gastrointestinal: Negative for abdominal pain, blood in stool, constipation, diarrhea, nausea and vomiting.   Genitourinary: Negative for dysuria, frequency, hematuria and urgency.   Musculoskeletal: Negative for arthralgias, back pain, joint swelling and myalgias.   Skin: Negative for color change and rash.   Allergic/Immunologic: Negative for immunocompromised state.   Neurological: Negative for dizziness, seizures, syncope, weakness and headaches.   Hematological: Negative for adenopathy. Does not bruise/bleed easily.   Psychiatric/Behavioral: Positive for dysphoric mood. Negative for decreased concentration, self-injury, sleep disturbance and suicidal ideas. The patient is nervous/anxious.         Overwhelmed         Objective:     Vitals:    09/29/20 0911   BP: (!) 126/98   BP Location: Right arm   Patient Position: Sitting   BP Method: Large (Manual)   Pulse: 67   Resp: 18   Temp: 97.5 °F (36.4 °C)   TempSrc: Oral   SpO2: 98%   Weight: 79.7 kg (175  "lb 11.3 oz)   Height: 5' 1" (1.549 m)          Physical Exam  Constitutional:       General: She is not in acute distress.     Appearance: She is well-developed. She is not ill-appearing, toxic-appearing or diaphoretic.      Comments: + obesity with Body mass index is 33.2 kg/m².       HENT:      Head: Normocephalic and atraumatic.      Right Ear: External ear normal. Tympanic membrane is scarred and perforated.      Left Ear: External ear normal. Tympanic membrane is scarred and perforated.      Ears:        Nose: Mucosal edema present.      Mouth/Throat:      Pharynx: No oropharyngeal exudate.   Eyes:      Pupils: Pupils are equal, round, and reactive to light.   Neck:      Musculoskeletal: Normal range of motion and neck supple.      Thyroid: No thyromegaly.      Trachea: No tracheal deviation.   Cardiovascular:      Rate and Rhythm: Normal rate and regular rhythm.      Heart sounds: Normal heart sounds. No murmur.   Pulmonary:      Effort: Pulmonary effort is normal. No respiratory distress.      Breath sounds: Normal breath sounds.   Abdominal:      General: Bowel sounds are normal. There is no distension.      Palpations: Abdomen is soft. There is no mass.      Tenderness: There is no abdominal tenderness. There is no guarding or rebound.      Hernia: No hernia is present.   Musculoskeletal: Normal range of motion.   Lymphadenopathy:      Cervical: No cervical adenopathy.   Skin:     General: Skin is warm and dry.      Findings: No rash.   Neurological:      Mental Status: She is alert and oriented to person, place, and time.      Cranial Nerves: No cranial nerve deficit.      Coordination: Coordination normal.   Psychiatric:         Mood and Affect: Mood is anxious and depressed. Affect is tearful.         Speech: Speech normal.         Behavior: Behavior normal. Behavior is cooperative.         Thought Content: Thought content normal. Thought content does not include suicidal ideation. Thought content does " not include suicidal plan.           Assessment:         ICD-10-CM ICD-9-CM   1. Other hyperlipidemia  E78.49 272.4   2. Temporary high blood pressure  R03.0 796.2   3. Stress reaction causing mixed disturbance of emotion and conduct  F43.0 308.4   4. Chronic GERD  K21.9 530.81   5. Breast cancer screening  Z12.39 V76.10       Plan:       Other hyperlipidemia  -  Increase the Atorvastatin to 20 mg daily and recheck in 3 months.  -     atorvastatin (LIPITOR) 20 MG tablet; Take 1 tablet (20 mg total) by mouth once daily.  Dispense: 90 tablet; Refill: 0  -     Comprehensive metabolic panel; Future; Expected date: 09/29/2020  -     Lipid Panel; Future; Expected date: 09/29/2020    Temporary high blood pressure  -  Monitor BP at home and record in my chart three times weekly.  Bring home BP cuff to next visit to assess for accuracy.  If > 130/80 consistently - will start BP medication.  -     MyChart Patient Entered Blood Pressure    Stress reaction causing mixed disturbance of emotion and conduct  -  Referral to psychiatry for evaluation and management.  -  Gave patient numbers to [facility] as well as [facility] to call and make appt.  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    Chronic GERD  -  Takes prn  -     pantoprazole (PROTONIX) 40 MG tablet; Take 1 tablet (40 mg total) by mouth once daily.  Dispense: 90 tablet; Refill: 0    Breast cancer screening  -     Mammo Digital Screening Bilat w/ [place]an; Future; Expected date: 09/29/2020      Follow up in about 3 months (around 12/29/2020) for fasting labs and follow up.     Patient's Medications   New Prescriptions    ATORVASTATIN (LIPITOR) 20 MG TABLET    Take 1 tablet (20 mg total) by mouth once daily.   Previous Medications    FLUTICASONE (FLONASE ALLERGY RELIEF) 50 MCG/ACTUATION NASAL SPRAY    use 2  squirts in the nostrils daily   Modified Medications    Modified Medication Previous Medication    PANTOPRAZOLE (PROTONIX) 40 MG TABLET pantoprazole (PROTONIX) 40 MG tablet       Take 1 tablet (40 mg total) by mouth once daily.    Take 1 tablet (40 mg total) by mouth once daily.   Discontinued Medications    ATORVASTATIN (LIPITOR) 10 MG TABLET    Take 1 tablet (10 mg total) by mouth once daily.

## 2020-10-01 ENCOUNTER — OFFICE VISIT (OUTPATIENT)
Dept: SPORTS MEDICINE | Facility: CLINIC | Age: 60
End: 2020-10-01
Payer: COMMERCIAL

## 2020-10-01 VITALS
WEIGHT: 178.31 LBS | SYSTOLIC BLOOD PRESSURE: 138 MMHG | BODY MASS INDEX: 33.66 KG/M2 | DIASTOLIC BLOOD PRESSURE: 93 MMHG | HEIGHT: 61 IN | HEART RATE: 74 BPM

## 2020-10-01 DIAGNOSIS — M25.511 CHRONIC RIGHT SHOULDER PAIN: ICD-10-CM

## 2020-10-01 DIAGNOSIS — M65.20 CALCIFIC TENDONITIS: ICD-10-CM

## 2020-10-01 DIAGNOSIS — G89.29 CHRONIC RIGHT SHOULDER PAIN: ICD-10-CM

## 2020-10-01 DIAGNOSIS — M19.019 DJD OF AC (ACROMIOCLAVICULAR) JOINT: ICD-10-CM

## 2020-10-01 DIAGNOSIS — M75.100 ROTATOR CUFF TEAR: Primary | ICD-10-CM

## 2020-10-01 PROCEDURE — 99214 OFFICE O/P EST MOD 30 MIN: CPT | Mod: S$GLB,,, | Performed by: ORTHOPAEDIC SURGERY

## 2020-10-01 PROCEDURE — 3008F BODY MASS INDEX DOCD: CPT | Mod: CPTII,S$GLB,, | Performed by: ORTHOPAEDIC SURGERY

## 2020-10-01 PROCEDURE — 3008F PR BODY MASS INDEX (BMI) DOCUMENTED: ICD-10-PCS | Mod: CPTII,S$GLB,, | Performed by: ORTHOPAEDIC SURGERY

## 2020-10-01 PROCEDURE — 99999 PR PBB SHADOW E&M-EST. PATIENT-LVL III: CPT | Mod: PBBFAC,,, | Performed by: ORTHOPAEDIC SURGERY

## 2020-10-01 PROCEDURE — 99999 PR PBB SHADOW E&M-EST. PATIENT-LVL III: ICD-10-PCS | Mod: PBBFAC,,, | Performed by: ORTHOPAEDIC SURGERY

## 2020-10-01 PROCEDURE — 99214 PR OFFICE/OUTPT VISIT, EST, LEVL IV, 30-39 MIN: ICD-10-PCS | Mod: S$GLB,,, | Performed by: ORTHOPAEDIC SURGERY

## 2020-10-01 NOTE — PROGRESS NOTES
CC: RIGHT shoulder pain     59 y.o. Female returns to clinic to discuss MRI results.    history of right shoulder pain who presents for initial evaluation as a new patient.  Patient states the pain started around December without specific mechanism of injury or trauma.  She states this has been a lingering problem that was worsened about 3 months ago when she attempted to lift something heavy.  She reports the pain is primarily localized to the anterior aspect of the right shoulder and occasionally radiates to the biceps region.  There is no associated numbness or tingling.  She states she feels her right arm is slightly weaker than the left.  She is right-hand dominant.  Thus far treatment has included over-the-counter Aleve which has provided some relief.  Patient states the only injury she can recall was when she was a young girl she fell off of monkey bars and dislocated her right shoulder.  She never sought treatment for this and states that it resolved on its own.  No prior shoulder surgeries.    She reports that the pain and weakness is worse with overhead activity. It also bothers her at night.    Is affecting ADLs.  Pain is  7/10 at it's worst.      Past Medical History:   Diagnosis Date    GERD (gastroesophageal reflux disease)     Hyperlipidemia     Kidney stone        Past Surgical History:   Procedure Laterality Date    COLONOSCOPY  2016    normal - repeat in 10 year - Dr. Damon Contreras = [facility]    CYSTOSCOPY W/ LASER LITHOTRIPSY      ESOPHAGOGASTRODUODENOSCOPY  2016    Dr. Contreras - Grade B reflux esophagitis and small hiatus hernia    TONSILLECTOMY         Family History   Problem Relation Age of Onset    Heart disease Mother         passed age 83 heart attack    Diabetes Mother     Diabetes Father     Heart disease Father         CABG around age 65;  heart failure at 68    No Known Problems Sister     Hypertension Brother     Hyperlipidemia Brother   "   Diabetes Brother     Thyroid disease Sister     Hyperlipidemia Sister     Diabetes Brother     Thyroid disease Brother     Myasthenia gravis Brother     No Known Problems Daughter     No Known Problems Daughter          Current Outpatient Medications:     atorvastatin (LIPITOR) 20 MG tablet, Take 1 tablet (20 mg total) by mouth once daily., Disp: 90 tablet, Rfl: 0    fluticasone (FLONASE ALLERGY RELIEF) 50 mcg/actuation nasal spray, use 2 squirts in the nostrils daily, Disp: 16 g, Rfl: 0    pantoprazole (PROTONIX) 40 MG tablet, Take 1 tablet (40 mg total) by mouth once daily., Disp: 90 tablet, Rfl: 0    Review of patient's allergies indicates:  No Known Allergies       REVIEW OF SYSTEMS:  Constitution: Negative. Negative for chills, fever and night sweats.   HENT: Negative for congestion and headaches.    Eyes: Negative for blurred vision, left vision loss and right vision loss.   Cardiovascular: Negative for chest pain and syncope.   Respiratory: Negative for cough and shortness of breath.    Endocrine: Negative for polydipsia, polyphagia and polyuria.   Hematologic/Lymphatic: Negative for bleeding problem. Does not bruise/bleed easily.   Skin: Negative for dry skin, itching and rash.   Musculoskeletal: Negative for falls.  Positive for right shoulder pain and muscle weakness.   Gastrointestinal: Negative for abdominal pain and bowel incontinence.   Genitourinary: Negative for bladder incontinence and nocturia.   Neurological: Negative for disturbances in coordination, loss of balance and seizures.   Psychiatric/Behavioral: Negative for depression. The patient does not have insomnia.    Allergic/Immunologic: Negative for hives and persistent infections.      PHYSICAL EXAMINATION:  Vitals:  BP (!) 138/93   Pulse 74   Ht 5' 1" (1.549 m)   Wt 80.9 kg (178 lb 4.8 oz)   LMP  (LMP Unknown)   BMI 33.69 kg/m²    General: The patient is alert and oriented x 3.  Mood is pleasant.  Observation of ears, eyes " and nose reveal no gross abnormalities.  No labored breathing observed.  Gait is coordinated. Patient can toe walk and heel walk without difficulty.      RIGHT SHOULDER / UPPER EXTREMITY EXAM    OBSERVATION:     Swelling  none  Deformity  none   Discoloration  none   Scapular winging none   Scars   none  Atrophy  none    TENDERNESS / CREPITUS (T/C):          T/C      T/C   Clavicle   -/-  SUPRAspinatus    +/-     AC Jt.    -/-  INFRAspinatus  -/-    SC Jt.    -/-  Deltoid    -/-      G. Tuberosity  -/-  LH BICEP groove  +/-   Acromion:  -/-  Midline Neck   -/-     Scapular Spine -/-  Trapezium   -/-   SMA Scapula  -/-  GH jt. line - post  -/-     Scapulothoracic  -/-         ROM: (* = with pain)  Left shoulder   Right shoulder        AROM (PROM)   AROM (PROM)   FE    170° (175°)     170° (175°)     ER at 0°    60°  (65°)    50°  (60°*)      IR (spine level)   T10     T10*    STRENGTH: (* = with pain) Left shoulder   Right shoulder   SCAPTION   5/5    4+/5    IR    5/5    5/5   ER    5/5    4+/5   BICEPS   5/5    4+/5   Deltoid    5/5    5/5     SIGNS:  Painful side       NEER   -    OTIMS  +    CAMPO   +    SPEEDS  neg     DROP ARM   -   BELLY PRESS neg   Superior escape none    LIFT-OFF  neg   X-Body ADD    neg    MOVING VALGUS neg        STABILITY TESTING    Left shoulder   Right shoulder    Translation     Anterior  up face     up face    Posterior  up face    up face    Sulcus   < 10mm    < 10 mm     Signs   Apprehension   neg      neg       Relocation   no change     no change      Jerk test  neg     neg    EXTREMITY NEURO-VASCULAR EXAM:    Sensation grossly intact to light touch all dermatomal regions.    DTR 2+ Biceps, Triceps, BR and Negative Byrons sign   Grossly intact motor function at Elbow, Wrist and Hand   Distal pulses radial and ulnar 2+, brisk cap refill, symmetric.      NECK:  Painless FROM and spinous processes non-tender. Negative Spurlings sign.      OTHER FINDINGS:  N/A scapular  dyskinesia    XRAYS right shoulder (07/31/2020):  FINDINGS:  Three views: There is calcific chronic rotator cuff tendinitis.  There is DJD.  No fracture dislocation bone destruction seen.    MRI (9/24/20):  1. Full-thickness insertional tear of the supraspinatus tendon.  Muscle bulk is maintained.  2. Calcific tendinitis of the infraspinatus tendon.  3. Subacromial subdeltoid bursitis.  4. Moderate acromioclavicular arthrosis.       ASSESSMENT:     Right shoulder pain, RC tear, AC DJD, Calcific tendinitis    PLAN:    1. Patient would like to schedule surgery in January 2021 due insurance deducible.  Will call to schedule  2. Work status: She will need a rotator cuff repair surgery in the future.  Please allow her to remain our of work until December due to pain.    Treatment options were discussed with the patient about shoulder.  I reviewed the MRI images with her and what this means for her shoulder.    We discussed both non-operative and operative options for her shoulder and the risks and benefits of each. Time was given for questions to be asked and all concerns were answered.    She would like to go forward with surgery for her shoulder which I think is reasonable.  All specific risks and benefits were reviewed.    These risks include but are not limited to: bleeding, infection, scarring, re-tear of repair, irreparability of the tear, damage to neurovascular structures, damage to cartilage, stiffness, blood clots, pulmonary embolism, swelling, compartment syndrome, need for further surgery, and the risks of anesthesia.      She verbalized her understanding of these risks and wished to proceed with surgery.    Time was spent face-to-face with the patient during this encounter on counseling about treatment options including surgery and coordination of her care for preoperative visits, surgery and post-operative rehab.     The operative plan will be:    right   1. Arthroscopic rotator cuff repair  2. Arthroscopic  subacromial decompression  3. Arthroscopic distal clavicle excision  4. Possible open biceps subpectoral tenodesis    Patient will  need medical clearance prior to the pre-operative appointment.    All questions were answered, patient will contact us for questions or concerns in the interim.

## 2020-10-01 NOTE — LETTER
[facility]  [facility]  [facility] [facility] [address]  Phone: [phone] October 1, 2020     Patient: Hallie Blake   YOB: 1960   Date of Visit: 10/1/2020       To Whom It May Concern:    Hallie Blake is a patient of mine.  She will need a rotator cuff repair surgery in the future.  Please allow her to remain our of work until December due to pain.    If you have any questions or concerns, please don't hesitate to contact my office.    Sincerely,    Thaddeus Rider MD

## 2020-10-19 ENCOUNTER — TELEPHONE (OUTPATIENT)
Dept: SPORTS MEDICINE | Facility: CLINIC | Age: 60
End: 2020-10-19

## 2020-10-19 NOTE — TELEPHONE ENCOUNTER
----- Message from Shruthi Rodarte sent at 10/19/2020  9:15 AM CDT -----      Patient states would like to  schedule surgery appt  Has a date picked out for January    Please contact patient to schedule @# [phone]     
Spoke to patient and informed her to call back in December to schedule surgery for January.  
24-Apr-2018

## 2020-11-02 ENCOUNTER — TELEPHONE (OUTPATIENT)
Dept: SPORTS MEDICINE | Facility: CLINIC | Age: 60
End: 2020-11-02

## 2020-11-02 NOTE — TELEPHONE ENCOUNTER
LVM - Called patient to get more information about the revision of her letter for work.       ----- Message from Barby Ndiaye sent at 11/2/2020 11:35 AM CST -----  Regarding: WORK LETTER  Contact: self  Pt states she need to revise her leave letter to 11/30/2020 Pt ask for a call    Contact info

## 2020-11-05 ENCOUNTER — TELEPHONE (OUTPATIENT)
Dept: SPORTS MEDICINE | Facility: CLINIC | Age: 60
End: 2020-11-05

## 2020-11-05 NOTE — TELEPHONE ENCOUNTER
Spoke with patient about updating note. She will reach out if she needs anything.  Also discussed that we would be calling her to discuss times in January for her RCR surgery.   ----- Message from Ambrose Gonsalves sent at 11/5/2020 11:45 AM CST -----  Contact: self  Pt is asking for a call back in regards to her Doctors Note. She stated that it is not correct     Contact info- [phone]

## 2020-11-16 ENCOUNTER — PATIENT MESSAGE (OUTPATIENT)
Dept: SPORTS MEDICINE | Facility: CLINIC | Age: 60
End: 2020-11-16

## 2020-11-18 ENCOUNTER — PATIENT MESSAGE (OUTPATIENT)
Dept: SPORTS MEDICINE | Facility: CLINIC | Age: 60
End: 2020-11-18

## 2020-12-07 ENCOUNTER — TELEPHONE (OUTPATIENT)
Dept: FAMILY MEDICINE | Facility: CLINIC | Age: 60
End: 2020-12-07

## 2020-12-07 NOTE — TELEPHONE ENCOUNTER
Spoke with patient in regards of message, advised that her lab orders is already in the system make sure to fast.

## 2020-12-07 NOTE — TELEPHONE ENCOUNTER
----- Message from Deepa Zapata sent at 12/7/2020  9:22 AM CST -----  Contact: [phone]/Self  Type: Requesting to speak with nurse    Who Called: Pt   Regarding: new lab work orders , [facility]    Would the patient rather a call back or a response via [facility]? Call back  Best Call Back Number: [phone]  Additional Information: Friday morning if possible

## 2020-12-14 ENCOUNTER — TELEPHONE (OUTPATIENT)
Dept: SPORTS MEDICINE | Facility: CLINIC | Age: 60
End: 2020-12-14

## 2020-12-14 ENCOUNTER — TELEPHONE (OUTPATIENT)
Dept: FAMILY MEDICINE | Facility: CLINIC | Age: 60
End: 2020-12-14

## 2020-12-14 DIAGNOSIS — Z01.818 PRE-OP TESTING: Primary | ICD-10-CM

## 2020-12-14 NOTE — TELEPHONE ENCOUNTER
----- Message from Carmela Yee MA sent at 11/13/2020 10:56 AM CST -----  Regarding: FW: 2nd  schedule surgery  Contact: pt    ----- Message -----  From: Reshma Bro  Sent: 11/13/2020  10:38 AM CST  To: Teres[facility] Staff  Subject: 2nd  schedule surgery                            2nd request    no response    2 issues     1)  called previously with no response to schedule  surgery for early Jan  -  Needs date for work     2)  pt faxed papers 11/12 that need to be completed for work  but  needs to speak with you before you fill papers in      She's taking time in December before surgery      Please acknowledge you received the fax    paper said [facility]     Pt would like to be called back   Pt can be reached at    [phone]

## 2020-12-14 NOTE — TELEPHONE ENCOUNTER
----- Message from Carmela Yee MA sent at 12/14/2020 12:52 PM CST -----  Contact: pt  COVID test needed.    Please and thank you  ----- Message -----  From: Carmela Yee MA  Sent: 11/13/2020  10:56 AM CST  To: Carmela Yee MA  Subject: FW: 2nd  schedule surgery                          ----- Message -----  From: Rsehma Bro  Sent: 11/13/2020  10:38 AM CST  To: [facility] Staff  Subject: 2nd  schedule surgery                            2nd request    no response    2 issues     1)  called previously with no response to schedule  surgery for early Jan  -  Needs date for work     2)  pt faxed papers 11/12 that need to be completed for work  but  needs to speak with you before you fill papers in      She's taking time in December before surgery      Please acknowledge you received the fax    paper said [facility]     Pt would like to be called back   Pt can be reached at    [phone]

## 2020-12-14 NOTE — TELEPHONE ENCOUNTER
----- Message from Janet Crowley sent at 12/14/2020 12:56 PM CST -----  Type:  Sooner Apoointment Request    Caller is requesting a sooner appointment.  Caller declined first available appointment listed below.  Caller will not accept being placed on the waitlist and is requesting a message be sent to doctor.  Name of Caller:Hallie  When is the first available appointment?1/2021  Symptoms:check up  Would the patient rather a call back or a response via [facility]? call  Best Call Back Number:[phone]  Additional Information: none

## 2020-12-14 NOTE — TELEPHONE ENCOUNTER
Called and spoke with pt in regards of her message. Pt states that she had blood work done, and was calling to get a follow up appt with SVITLANA Isaacs. Informed pt that SVITLANA Isaacs is out of the office today ,and her schedule is full for the rest of the month. Pt verbalized understanding of message. Pt also informed me that she is having rotor cuff surgery on 1/19/2021 , and is needing clearance . Pt made an appt on 1/05/2021 for surgical clearance. Does the patient need additional labs to complete before her appt. Please advise .

## 2020-12-15 NOTE — TELEPHONE ENCOUNTER
Pt should not need any additional labs for medical clearance.  Sometimes the anesthesiologist or surgeon may want a PT/INR but that is usually ordered by them so it is combined with the surgery billing.  If we were to order it, it is not covered by the insurance.

## 2020-12-16 ENCOUNTER — TELEPHONE (OUTPATIENT)
Dept: SPORTS MEDICINE | Facility: CLINIC | Age: 60
End: 2020-12-16

## 2020-12-16 DIAGNOSIS — M75.101 NONTRAUMATIC TEAR OF RIGHT ROTATOR CUFF, UNSPECIFIED TEAR EXTENT: Primary | ICD-10-CM

## 2020-12-16 DIAGNOSIS — M19.011 ARTHRITIS OF RIGHT ACROMIOCLAVICULAR JOINT: ICD-10-CM

## 2020-12-16 DIAGNOSIS — M75.21 BICEPS TENDINITIS OF RIGHT UPPER EXTREMITY: ICD-10-CM

## 2020-12-16 NOTE — TELEPHONE ENCOUNTER
Start Physical Therapy on 1/25/21.  ----- Message from Carmela Yee MA sent at 12/16/2020  1:00 PM CST -----  Patient will do PT at [facility]    Pre op - 01/19/21    Date of surgery - 01/20/21

## 2021-01-05 ENCOUNTER — OFFICE VISIT (OUTPATIENT)
Dept: FAMILY MEDICINE | Facility: CLINIC | Age: 61
End: 2021-01-05
Payer: COMMERCIAL

## 2021-01-05 VITALS
RESPIRATION RATE: 16 BRPM | OXYGEN SATURATION: 98 % | WEIGHT: 176.94 LBS | HEART RATE: 68 BPM | SYSTOLIC BLOOD PRESSURE: 134 MMHG | DIASTOLIC BLOOD PRESSURE: 88 MMHG | HEIGHT: 61 IN | BODY MASS INDEX: 33.41 KG/M2 | TEMPERATURE: 98 F

## 2021-01-05 DIAGNOSIS — K21.9 CHRONIC GERD: ICD-10-CM

## 2021-01-05 DIAGNOSIS — R03.0 TEMPORARY HIGH BLOOD PRESSURE: ICD-10-CM

## 2021-01-05 DIAGNOSIS — Z01.818 PREOPERATIVE GENERAL PHYSICAL EXAMINATION: Primary | ICD-10-CM

## 2021-01-05 DIAGNOSIS — E78.49 OTHER HYPERLIPIDEMIA: ICD-10-CM

## 2021-01-05 DIAGNOSIS — H72.93 PERFORATION OF BOTH TYMPANIC MEMBRANES: ICD-10-CM

## 2021-01-05 PROCEDURE — 3008F BODY MASS INDEX DOCD: CPT | Mod: CPTII,S$GLB,, | Performed by: NURSE PRACTITIONER

## 2021-01-05 PROCEDURE — 99214 PR OFFICE/OUTPT VISIT, EST, LEVL IV, 30-39 MIN: ICD-10-PCS | Mod: S$GLB,,, | Performed by: NURSE PRACTITIONER

## 2021-01-05 PROCEDURE — 3075F SYST BP GE 130 - 139MM HG: CPT | Mod: CPTII,S$GLB,, | Performed by: NURSE PRACTITIONER

## 2021-01-05 PROCEDURE — 93010 ELECTROCARDIOGRAM REPORT: CPT | Mod: S$GLB,,, | Performed by: INTERNAL MEDICINE

## 2021-01-05 PROCEDURE — 3075F PR MOST RECENT SYSTOLIC BLOOD PRESS GE 130-139MM HG: ICD-10-PCS | Mod: CPTII,S$GLB,, | Performed by: NURSE PRACTITIONER

## 2021-01-05 PROCEDURE — 99999 PR PBB SHADOW E&M-EST. PATIENT-LVL V: ICD-10-PCS | Mod: PBBFAC,,, | Performed by: NURSE PRACTITIONER

## 2021-01-05 PROCEDURE — 99214 OFFICE O/P EST MOD 30 MIN: CPT | Mod: S$GLB,,, | Performed by: NURSE PRACTITIONER

## 2021-01-05 PROCEDURE — 3008F PR BODY MASS INDEX (BMI) DOCUMENTED: ICD-10-PCS | Mod: CPTII,S$GLB,, | Performed by: NURSE PRACTITIONER

## 2021-01-05 PROCEDURE — 1126F AMNT PAIN NOTED NONE PRSNT: CPT | Mod: S$GLB,,, | Performed by: NURSE PRACTITIONER

## 2021-01-05 PROCEDURE — 93010 EKG 12-LEAD: ICD-10-PCS | Mod: S$GLB,,, | Performed by: INTERNAL MEDICINE

## 2021-01-05 PROCEDURE — 3079F DIAST BP 80-89 MM HG: CPT | Mod: CPTII,S$GLB,, | Performed by: NURSE PRACTITIONER

## 2021-01-05 PROCEDURE — 3079F PR MOST RECENT DIASTOLIC BLOOD PRESSURE 80-89 MM HG: ICD-10-PCS | Mod: CPTII,S$GLB,, | Performed by: NURSE PRACTITIONER

## 2021-01-05 PROCEDURE — 1126F PR PAIN SEVERITY QUANTIFIED, NO PAIN PRESENT: ICD-10-PCS | Mod: S$GLB,,, | Performed by: NURSE PRACTITIONER

## 2021-01-05 PROCEDURE — 93005 ELECTROCARDIOGRAM TRACING: CPT | Mod: S$GLB,,, | Performed by: NURSE PRACTITIONER

## 2021-01-05 PROCEDURE — 93005 EKG 12-LEAD: ICD-10-PCS | Mod: S$GLB,,, | Performed by: NURSE PRACTITIONER

## 2021-01-05 PROCEDURE — 99999 PR PBB SHADOW E&M-EST. PATIENT-LVL V: CPT | Mod: PBBFAC,,, | Performed by: NURSE PRACTITIONER

## 2021-01-07 ENCOUNTER — TELEPHONE (OUTPATIENT)
Dept: SPORTS MEDICINE | Facility: CLINIC | Age: 61
End: 2021-01-07

## 2021-01-07 ENCOUNTER — PATIENT MESSAGE (OUTPATIENT)
Dept: FAMILY MEDICINE | Facility: CLINIC | Age: 61
End: 2021-01-07

## 2021-01-08 ENCOUNTER — PATIENT MESSAGE (OUTPATIENT)
Dept: FAMILY MEDICINE | Facility: CLINIC | Age: 61
End: 2021-01-08

## 2021-01-14 ENCOUNTER — PATIENT MESSAGE (OUTPATIENT)
Dept: OTOLARYNGOLOGY | Facility: CLINIC | Age: 61
End: 2021-01-14

## 2021-01-17 ENCOUNTER — LAB VISIT (OUTPATIENT)
Dept: URGENT CARE | Facility: CLINIC | Age: 61
End: 2021-01-17
Payer: COMMERCIAL

## 2021-01-17 VITALS — TEMPERATURE: 98 F

## 2021-01-17 DIAGNOSIS — Z01.818 PRE-OP TESTING: ICD-10-CM

## 2021-01-17 PROCEDURE — U0003 INFECTIOUS AGENT DETECTION BY NUCLEIC ACID (DNA OR RNA); SEVERE ACUTE RESPIRATORY SYNDROME CORONAVIRUS 2 (SARS-COV-2) (CORONAVIRUS DISEASE [COVID-19]), AMPLIFIED PROBE TECHNIQUE, MAKING USE OF HIGH THROUGHPUT TECHNOLOGIES AS DESCRIBED BY CMS-2020-01-R: HCPCS

## 2021-01-18 LAB — SARS-COV-2 RNA RESP QL NAA+PROBE: NOT DETECTED

## 2021-01-19 ENCOUNTER — OFFICE VISIT (OUTPATIENT)
Dept: SPORTS MEDICINE | Facility: CLINIC | Age: 61
End: 2021-01-19
Payer: COMMERCIAL

## 2021-01-19 ENCOUNTER — ANESTHESIA EVENT (OUTPATIENT)
Dept: SURGERY | Facility: HOSPITAL | Age: 61
End: 2021-01-19
Payer: COMMERCIAL

## 2021-01-19 VITALS
BODY MASS INDEX: 31.72 KG/M2 | DIASTOLIC BLOOD PRESSURE: 90 MMHG | HEART RATE: 69 BPM | HEIGHT: 61 IN | SYSTOLIC BLOOD PRESSURE: 143 MMHG | WEIGHT: 168 LBS

## 2021-01-19 DIAGNOSIS — M75.101 NONTRAUMATIC TEAR OF RIGHT ROTATOR CUFF, UNSPECIFIED TEAR EXTENT: Primary | ICD-10-CM

## 2021-01-19 PROCEDURE — 3008F PR BODY MASS INDEX (BMI) DOCUMENTED: ICD-10-PCS | Mod: CPTII,S$GLB,, | Performed by: PHYSICIAN ASSISTANT

## 2021-01-19 PROCEDURE — 99499 NO LOS: ICD-10-PCS | Mod: S$GLB,,, | Performed by: PHYSICIAN ASSISTANT

## 2021-01-19 PROCEDURE — 1126F PR PAIN SEVERITY QUANTIFIED, NO PAIN PRESENT: ICD-10-PCS | Mod: S$GLB,,, | Performed by: PHYSICIAN ASSISTANT

## 2021-01-19 PROCEDURE — 99999 PR PBB SHADOW E&M-EST. PATIENT-LVL III: CPT | Mod: PBBFAC,,, | Performed by: PHYSICIAN ASSISTANT

## 2021-01-19 PROCEDURE — 3008F BODY MASS INDEX DOCD: CPT | Mod: CPTII,S$GLB,, | Performed by: PHYSICIAN ASSISTANT

## 2021-01-19 PROCEDURE — 99999 PR PBB SHADOW E&M-EST. PATIENT-LVL III: ICD-10-PCS | Mod: PBBFAC,,, | Performed by: PHYSICIAN ASSISTANT

## 2021-01-19 PROCEDURE — 99499 UNLISTED E&M SERVICE: CPT | Mod: S$GLB,,, | Performed by: PHYSICIAN ASSISTANT

## 2021-01-19 PROCEDURE — 1126F AMNT PAIN NOTED NONE PRSNT: CPT | Mod: S$GLB,,, | Performed by: PHYSICIAN ASSISTANT

## 2021-01-19 RX ORDER — ASPIRIN 325 MG
325 TABLET, DELAYED RELEASE (ENTERIC COATED) ORAL DAILY
Qty: 21 TABLET | Refills: 0 | Status: SHIPPED | OUTPATIENT
Start: 2021-01-19 | End: 2021-02-18 | Stop reason: ALTCHOICE

## 2021-01-19 RX ORDER — TRAMADOL HYDROCHLORIDE 50 MG/1
50 TABLET ORAL EVERY 6 HOURS PRN
Qty: 28 TABLET | Refills: 0 | Status: SHIPPED | OUTPATIENT
Start: 2021-01-19 | End: 2021-08-04 | Stop reason: ALTCHOICE

## 2021-01-19 RX ORDER — OXYCODONE AND ACETAMINOPHEN 10; 325 MG/1; MG/1
1 TABLET ORAL EVERY 6 HOURS PRN
Qty: 28 TABLET | Refills: 0 | Status: SHIPPED | OUTPATIENT
Start: 2021-01-19 | End: 2021-08-04 | Stop reason: ALTCHOICE

## 2021-01-19 RX ORDER — PROMETHAZINE HYDROCHLORIDE 25 MG/1
25 TABLET ORAL EVERY 6 HOURS PRN
Qty: 20 TABLET | Refills: 0 | Status: SHIPPED | OUTPATIENT
Start: 2021-01-19 | End: 2021-08-04 | Stop reason: ALTCHOICE

## 2021-01-19 RX ORDER — SODIUM CHLORIDE 9 MG/ML
INJECTION, SOLUTION INTRAVENOUS CONTINUOUS
Status: CANCELLED | OUTPATIENT
Start: 2021-01-19

## 2021-01-20 ENCOUNTER — HOSPITAL ENCOUNTER (OUTPATIENT)
Facility: HOSPITAL | Age: 61
Discharge: HOME | End: 2021-01-20
Attending: ORTHOPAEDIC SURGERY | Admitting: ORTHOPAEDIC SURGERY
Payer: COMMERCIAL

## 2021-01-20 ENCOUNTER — ANESTHESIA (OUTPATIENT)
Dept: SURGERY | Facility: HOSPITAL | Age: 61
End: 2021-01-20
Payer: COMMERCIAL

## 2021-01-20 DIAGNOSIS — M75.101 NONTRAUMATIC TEAR OF RIGHT ROTATOR CUFF, UNSPECIFIED TEAR EXTENT: ICD-10-CM

## 2021-01-20 PROCEDURE — 94761 N-INVAS EAR/PLS OXIMETRY MLT: CPT

## 2021-01-20 PROCEDURE — 29827 PR SHLDR ARTHROSCOP,SURG,W/ROTAT CUFF REPR: ICD-10-PCS | Mod: RT,,, | Performed by: ORTHOPAEDIC SURGERY

## 2021-01-20 PROCEDURE — 36000710: Performed by: ORTHOPAEDIC SURGERY

## 2021-01-20 PROCEDURE — 64416 NJX AA&/STRD BRCH PL NFS IMG: CPT | Performed by: STUDENT IN AN ORGANIZED HEALTH CARE EDUCATION/TRAINING PROGRAM

## 2021-01-20 PROCEDURE — 63600175 PHARM REV CODE 636 W HCPCS: Performed by: ORTHOPAEDIC SURGERY

## 2021-01-20 PROCEDURE — 63600175 PHARM REV CODE 636 W HCPCS: Performed by: NURSE ANESTHETIST, CERTIFIED REGISTERED

## 2021-01-20 PROCEDURE — C1713 ANCHOR/SCREW BN/BN,TIS/BN: HCPCS | Performed by: ORTHOPAEDIC SURGERY

## 2021-01-20 PROCEDURE — 25000003 PHARM REV CODE 250: Performed by: PHYSICIAN ASSISTANT

## 2021-01-20 PROCEDURE — 99900035 HC TECH TIME PER 15 MIN (STAT)

## 2021-01-20 PROCEDURE — 27201423 OPTIME MED/SURG SUP & DEVICES STERILE SUPPLY: Performed by: ORTHOPAEDIC SURGERY

## 2021-01-20 PROCEDURE — 76942 ECHO GUIDE FOR BIOPSY: CPT | Mod: 26,,, | Performed by: ANESTHESIOLOGY

## 2021-01-20 PROCEDURE — 63600175 PHARM REV CODE 636 W HCPCS: Performed by: PHYSICIAN ASSISTANT

## 2021-01-20 PROCEDURE — C1751 CATH, INF, PER/CENT/MIDLINE: HCPCS | Performed by: STUDENT IN AN ORGANIZED HEALTH CARE EDUCATION/TRAINING PROGRAM

## 2021-01-20 PROCEDURE — 71000016 HC POSTOP RECOV ADDL HR: Performed by: ORTHOPAEDIC SURGERY

## 2021-01-20 PROCEDURE — D9220A PRA ANESTHESIA: ICD-10-PCS | Mod: ANES,,, | Performed by: ANESTHESIOLOGY

## 2021-01-20 PROCEDURE — 37000009 HC ANESTHESIA EA ADD 15 MINS: Performed by: ORTHOPAEDIC SURGERY

## 2021-01-20 PROCEDURE — D9220A PRA ANESTHESIA: Mod: ANES,,, | Performed by: ANESTHESIOLOGY

## 2021-01-20 PROCEDURE — 71000033 HC RECOVERY, INTIAL HOUR: Performed by: ORTHOPAEDIC SURGERY

## 2021-01-20 PROCEDURE — D9220A PRA ANESTHESIA: ICD-10-PCS | Mod: CRNA,,, | Performed by: NURSE ANESTHETIST, CERTIFIED REGISTERED

## 2021-01-20 PROCEDURE — 29826 SHO ARTHRS SRG DECOMPRESSION: CPT | Mod: RT,,, | Performed by: ORTHOPAEDIC SURGERY

## 2021-01-20 PROCEDURE — 25000003 PHARM REV CODE 250: Performed by: NURSE ANESTHETIST, CERTIFIED REGISTERED

## 2021-01-20 PROCEDURE — 63600175 PHARM REV CODE 636 W HCPCS: Performed by: ANESTHESIOLOGY

## 2021-01-20 PROCEDURE — 63600175 PHARM REV CODE 636 W HCPCS: Performed by: STUDENT IN AN ORGANIZED HEALTH CARE EDUCATION/TRAINING PROGRAM

## 2021-01-20 PROCEDURE — 36000711: Performed by: ORTHOPAEDIC SURGERY

## 2021-01-20 PROCEDURE — 64416 PR NERVE BLOCK INJ, ANES/STEROID, BRACHIAL PLEXUS, CONT INFUSION, INCL IMAG GUIDANCE: ICD-10-PCS | Mod: 59,RT,, | Performed by: ANESTHESIOLOGY

## 2021-01-20 PROCEDURE — 25000003 PHARM REV CODE 250: Performed by: STUDENT IN AN ORGANIZED HEALTH CARE EDUCATION/TRAINING PROGRAM

## 2021-01-20 PROCEDURE — 64416 NJX AA&/STRD BRCH PL NFS IMG: CPT | Mod: 59,RT,, | Performed by: ANESTHESIOLOGY

## 2021-01-20 PROCEDURE — 71000015 HC POSTOP RECOV 1ST HR: Performed by: ORTHOPAEDIC SURGERY

## 2021-01-20 PROCEDURE — D9220A PRA ANESTHESIA: Mod: CRNA,,, | Performed by: NURSE ANESTHETIST, CERTIFIED REGISTERED

## 2021-01-20 PROCEDURE — 76942 PR U/S GUIDANCE FOR NEEDLE GUIDANCE: ICD-10-PCS | Mod: 26,,, | Performed by: ANESTHESIOLOGY

## 2021-01-20 PROCEDURE — 29827 SHO ARTHRS SRG RT8TR CUF RPR: CPT | Mod: RT,,, | Performed by: ORTHOPAEDIC SURGERY

## 2021-01-20 PROCEDURE — 37000008 HC ANESTHESIA 1ST 15 MINUTES: Performed by: ORTHOPAEDIC SURGERY

## 2021-01-20 PROCEDURE — 29826 PR SHLDR ARTHROSCOP,PART ACROMIOPLAS: ICD-10-PCS | Mod: RT,,, | Performed by: ORTHOPAEDIC SURGERY

## 2021-01-20 DEVICE — ANCHOR HEALIX 5.5 ADV BR3: Type: IMPLANTABLE DEVICE | Site: SHOULDER | Status: FUNCTIONAL

## 2021-01-20 RX ORDER — FENTANYL CITRATE 50 UG/ML
25 INJECTION, SOLUTION INTRAMUSCULAR; INTRAVENOUS EVERY 5 MIN PRN
Status: DISCONTINUED | OUTPATIENT
Start: 2021-01-20 | End: 2021-01-20 | Stop reason: HOSPADM

## 2021-01-20 RX ORDER — MIDAZOLAM HYDROCHLORIDE 1 MG/ML
INJECTION INTRAMUSCULAR; INTRAVENOUS
Status: DISCONTINUED | OUTPATIENT
Start: 2021-01-20 | End: 2021-01-20

## 2021-01-20 RX ORDER — DEXAMETHASONE SODIUM PHOSPHATE 4 MG/ML
INJECTION, SOLUTION INTRA-ARTICULAR; INTRALESIONAL; INTRAMUSCULAR; INTRAVENOUS; SOFT TISSUE
Status: DISCONTINUED | OUTPATIENT
Start: 2021-01-20 | End: 2021-01-20

## 2021-01-20 RX ORDER — LIDOCAINE HCL/PF 100 MG/5ML
SYRINGE (ML) INTRAVENOUS
Status: DISCONTINUED | OUTPATIENT
Start: 2021-01-20 | End: 2021-01-20

## 2021-01-20 RX ORDER — ROPIVACAINE HYDROCHLORIDE 2 MG/ML
INJECTION, SOLUTION EPIDURAL; INFILTRATION; PERINEURAL
Status: DISCONTINUED | OUTPATIENT
Start: 2021-01-20 | End: 2021-01-20 | Stop reason: HOSPADM

## 2021-01-20 RX ORDER — DIPHENHYDRAMINE HYDROCHLORIDE 50 MG/ML
6.25 INJECTION INTRAMUSCULAR; INTRAVENOUS ONCE
Status: COMPLETED | OUTPATIENT
Start: 2021-01-20 | End: 2021-01-20

## 2021-01-20 RX ORDER — FENTANYL CITRATE 50 UG/ML
INJECTION, SOLUTION INTRAMUSCULAR; INTRAVENOUS
Status: DISCONTINUED | OUTPATIENT
Start: 2021-01-20 | End: 2021-01-20

## 2021-01-20 RX ORDER — FAMOTIDINE 10 MG/ML
INJECTION INTRAVENOUS
Status: DISCONTINUED | OUTPATIENT
Start: 2021-01-20 | End: 2021-01-20

## 2021-01-20 RX ORDER — CELECOXIB 200 MG/1
400 CAPSULE ORAL ONCE
Status: COMPLETED | OUTPATIENT
Start: 2021-01-20 | End: 2021-01-20

## 2021-01-20 RX ORDER — CEFAZOLIN SODIUM 1 G/3ML
2 INJECTION, POWDER, FOR SOLUTION INTRAMUSCULAR; INTRAVENOUS
Status: COMPLETED | OUTPATIENT
Start: 2021-01-20 | End: 2021-01-20

## 2021-01-20 RX ORDER — NEOSTIGMINE METHYLSULFATE 1 MG/ML
INJECTION, SOLUTION INTRAVENOUS
Status: DISCONTINUED | OUTPATIENT
Start: 2021-01-20 | End: 2021-01-20

## 2021-01-20 RX ORDER — OXYCODONE HYDROCHLORIDE 5 MG/1
5 TABLET ORAL
Status: DISCONTINUED | OUTPATIENT
Start: 2021-01-20 | End: 2021-01-20 | Stop reason: HOSPADM

## 2021-01-20 RX ORDER — MIDAZOLAM HYDROCHLORIDE 1 MG/ML
0.5 INJECTION INTRAMUSCULAR; INTRAVENOUS
Status: DISCONTINUED | OUTPATIENT
Start: 2021-01-20 | End: 2021-01-20 | Stop reason: HOSPADM

## 2021-01-20 RX ORDER — KETAMINE HCL IN 0.9 % NACL 50 MG/5 ML
SYRINGE (ML) INTRAVENOUS
Status: DISCONTINUED | OUTPATIENT
Start: 2021-01-20 | End: 2021-01-20

## 2021-01-20 RX ORDER — PROPOFOL 10 MG/ML
VIAL (ML) INTRAVENOUS
Status: DISCONTINUED | OUTPATIENT
Start: 2021-01-20 | End: 2021-01-20

## 2021-01-20 RX ORDER — DEXMEDETOMIDINE HYDROCHLORIDE 100 UG/ML
INJECTION, SOLUTION INTRAVENOUS
Status: DISCONTINUED | OUTPATIENT
Start: 2021-01-20 | End: 2021-01-20

## 2021-01-20 RX ORDER — SODIUM CHLORIDE 9 MG/ML
INJECTION, SOLUTION INTRAVENOUS CONTINUOUS
Status: DISCONTINUED | OUTPATIENT
Start: 2021-01-20 | End: 2021-01-20 | Stop reason: HOSPADM

## 2021-01-20 RX ORDER — DROPERIDOL 2.5 MG/ML
0.62 INJECTION, SOLUTION INTRAMUSCULAR; INTRAVENOUS ONCE
Status: COMPLETED | OUTPATIENT
Start: 2021-01-20 | End: 2021-01-20

## 2021-01-20 RX ORDER — ROPIVACAINE HYDROCHLORIDE 5 MG/ML
INJECTION, SOLUTION EPIDURAL; INFILTRATION; PERINEURAL
Status: COMPLETED | OUTPATIENT
Start: 2021-01-20 | End: 2021-01-20

## 2021-01-20 RX ORDER — EPINEPHRINE 1 MG/ML
INJECTION, SOLUTION INTRACARDIAC; INTRAMUSCULAR; INTRAVENOUS; SUBCUTANEOUS
Status: DISCONTINUED | OUTPATIENT
Start: 2021-01-20 | End: 2021-01-20 | Stop reason: HOSPADM

## 2021-01-20 RX ORDER — ROPIVACAINE HYDROCHLORIDE 2 MG/ML
6 INJECTION, SOLUTION EPIDURAL; INFILTRATION; PERINEURAL CONTINUOUS
Status: DISCONTINUED | OUTPATIENT
Start: 2021-01-20 | End: 2021-01-20

## 2021-01-20 RX ORDER — CARBOXYMETHYLCELLULOSE SODIUM 5 MG/ML
SOLUTION/ DROPS OPHTHALMIC
Status: DISCONTINUED | OUTPATIENT
Start: 2021-01-20 | End: 2021-01-20

## 2021-01-20 RX ORDER — ACETAMINOPHEN 500 MG
1000 TABLET ORAL
Status: COMPLETED | OUTPATIENT
Start: 2021-01-20 | End: 2021-01-20

## 2021-01-20 RX ORDER — ROCURONIUM BROMIDE 10 MG/ML
INJECTION, SOLUTION INTRAVENOUS
Status: DISCONTINUED | OUTPATIENT
Start: 2021-01-20 | End: 2021-01-20

## 2021-01-20 RX ORDER — ONDANSETRON 2 MG/ML
INJECTION INTRAMUSCULAR; INTRAVENOUS
Status: DISCONTINUED | OUTPATIENT
Start: 2021-01-20 | End: 2021-01-20

## 2021-01-20 RX ADMIN — FENTANYL CITRATE 100 MCG: 50 INJECTION, SOLUTION INTRAMUSCULAR; INTRAVENOUS at 10:01

## 2021-01-20 RX ADMIN — SODIUM CHLORIDE 125 ML/HR: 0.9 INJECTION, SOLUTION INTRAVENOUS at 08:01

## 2021-01-20 RX ADMIN — SODIUM CHLORIDE: 9 INJECTION, SOLUTION INTRAVENOUS at 08:01

## 2021-01-20 RX ADMIN — DIPHENHYDRAMINE HYDROCHLORIDE 6.25 MG: 50 INJECTION, SOLUTION INTRAMUSCULAR; INTRAVENOUS at 02:01

## 2021-01-20 RX ADMIN — Medication 6 ML/HR: at 01:01

## 2021-01-20 RX ADMIN — Medication 10 MG: at 12:01

## 2021-01-20 RX ADMIN — DEXMEDETOMIDINE HYDROCHLORIDE 12 MCG: 100 INJECTION, SOLUTION, CONCENTRATE INTRAVENOUS at 10:01

## 2021-01-20 RX ADMIN — MIDAZOLAM HYDROCHLORIDE 2 MG: 1 INJECTION, SOLUTION INTRAMUSCULAR; INTRAVENOUS at 10:01

## 2021-01-20 RX ADMIN — Medication 20 MG: at 10:01

## 2021-01-20 RX ADMIN — ROCURONIUM BROMIDE 50 MG: 10 INJECTION, SOLUTION INTRAVENOUS at 10:01

## 2021-01-20 RX ADMIN — PROPOFOL 200 MG: 10 INJECTION, EMULSION INTRAVENOUS at 10:01

## 2021-01-20 RX ADMIN — GLYCOPYRROLATE 0.4 MG: 0.2 INJECTION, SOLUTION INTRAMUSCULAR; INTRAVITREAL at 12:01

## 2021-01-20 RX ADMIN — MIDAZOLAM 1 MG: 1 INJECTION INTRAMUSCULAR; INTRAVENOUS at 09:01

## 2021-01-20 RX ADMIN — FENTANYL CITRATE 50 MCG: 50 INJECTION, SOLUTION INTRAMUSCULAR; INTRAVENOUS at 09:01

## 2021-01-20 RX ADMIN — CARBOXYMETHYLCELLULOSE SODIUM 2 DROP: 5 SOLUTION/ DROPS OPHTHALMIC at 10:01

## 2021-01-20 RX ADMIN — ONDANSETRON 4 MG: 2 INJECTION, SOLUTION INTRAMUSCULAR; INTRAVENOUS at 12:01

## 2021-01-20 RX ADMIN — CELECOXIB 400 MG: 200 CAPSULE ORAL at 08:01

## 2021-01-20 RX ADMIN — DROPERIDOL 0.62 MG: 2.5 INJECTION, SOLUTION INTRAMUSCULAR; INTRAVENOUS at 02:01

## 2021-01-20 RX ADMIN — LIDOCAINE HYDROCHLORIDE 40 MG: 20 INJECTION, SOLUTION INTRAVENOUS at 10:01

## 2021-01-20 RX ADMIN — ROPIVACAINE HYDROCHLORIDE 12.5 ML: 5 INJECTION, SOLUTION EPIDURAL; INFILTRATION; PERINEURAL at 09:01

## 2021-01-20 RX ADMIN — FAMOTIDINE 20 MG: 10 INJECTION, SOLUTION INTRAVENOUS at 10:01

## 2021-01-20 RX ADMIN — Medication: at 03:01

## 2021-01-20 RX ADMIN — CEFAZOLIN 2 G: 330 INJECTION, POWDER, FOR SOLUTION INTRAMUSCULAR; INTRAVENOUS at 10:01

## 2021-01-20 RX ADMIN — MIDAZOLAM 3 MG: 1 INJECTION INTRAMUSCULAR; INTRAVENOUS at 09:01

## 2021-01-20 RX ADMIN — NEOSTIGMINE METHYLSULFATE 3 MG: 1 INJECTION INTRAVENOUS at 12:01

## 2021-01-20 RX ADMIN — DEXAMETHASONE SODIUM PHOSPHATE 8 MG: 4 INJECTION, SOLUTION INTRAMUSCULAR; INTRAVENOUS at 10:01

## 2021-01-20 RX ADMIN — ACETAMINOPHEN 1000 MG: 500 TABLET ORAL at 08:01

## 2021-01-22 VITALS
OXYGEN SATURATION: 96 % | TEMPERATURE: 97 F | BODY MASS INDEX: 31.72 KG/M2 | DIASTOLIC BLOOD PRESSURE: 65 MMHG | SYSTOLIC BLOOD PRESSURE: 101 MMHG | HEART RATE: 77 BPM | HEIGHT: 61 IN | WEIGHT: 168 LBS | RESPIRATION RATE: 23 BRPM

## 2021-01-25 PROBLEM — Z74.09 IMPAIRED MOBILITY AND ACTIVITIES OF DAILY LIVING: Status: ACTIVE | Noted: 2021-01-25

## 2021-01-25 PROBLEM — Z78.9 IMPAIRED MOBILITY AND ACTIVITIES OF DAILY LIVING: Status: ACTIVE | Noted: 2021-01-25

## 2021-01-25 PROBLEM — M25.611 IMPAIRED RANGE OF MOTION OF RIGHT SHOULDER: Status: ACTIVE | Noted: 2021-01-25

## 2021-02-04 ENCOUNTER — OFFICE VISIT (OUTPATIENT)
Dept: SPORTS MEDICINE | Facility: CLINIC | Age: 61
End: 2021-02-04
Payer: COMMERCIAL

## 2021-02-04 VITALS
WEIGHT: 168 LBS | DIASTOLIC BLOOD PRESSURE: 83 MMHG | SYSTOLIC BLOOD PRESSURE: 129 MMHG | BODY MASS INDEX: 31.72 KG/M2 | HEART RATE: 74 BPM | HEIGHT: 61 IN

## 2021-02-04 DIAGNOSIS — Z98.890 S/P ARTHROSCOPY OF RIGHT SHOULDER: Primary | ICD-10-CM

## 2021-02-04 PROCEDURE — 1126F PR PAIN SEVERITY QUANTIFIED, NO PAIN PRESENT: ICD-10-PCS | Mod: S$GLB,,, | Performed by: PHYSICIAN ASSISTANT

## 2021-02-04 PROCEDURE — 99024 PR POST-OP FOLLOW-UP VISIT: ICD-10-PCS | Mod: S$GLB,,, | Performed by: PHYSICIAN ASSISTANT

## 2021-02-04 PROCEDURE — 99999 PR PBB SHADOW E&M-EST. PATIENT-LVL III: CPT | Mod: PBBFAC,,, | Performed by: PHYSICIAN ASSISTANT

## 2021-02-04 PROCEDURE — 3008F PR BODY MASS INDEX (BMI) DOCUMENTED: ICD-10-PCS | Mod: CPTII,S$GLB,, | Performed by: PHYSICIAN ASSISTANT

## 2021-02-04 PROCEDURE — 3008F BODY MASS INDEX DOCD: CPT | Mod: CPTII,S$GLB,, | Performed by: PHYSICIAN ASSISTANT

## 2021-02-04 PROCEDURE — 99024 POSTOP FOLLOW-UP VISIT: CPT | Mod: S$GLB,,, | Performed by: PHYSICIAN ASSISTANT

## 2021-02-04 PROCEDURE — 1126F AMNT PAIN NOTED NONE PRSNT: CPT | Mod: S$GLB,,, | Performed by: PHYSICIAN ASSISTANT

## 2021-02-04 PROCEDURE — 99999 PR PBB SHADOW E&M-EST. PATIENT-LVL III: ICD-10-PCS | Mod: PBBFAC,,, | Performed by: PHYSICIAN ASSISTANT

## 2021-02-08 DIAGNOSIS — E78.49 OTHER HYPERLIPIDEMIA: ICD-10-CM

## 2021-02-09 RX ORDER — ATORVASTATIN CALCIUM 20 MG/1
TABLET, FILM COATED ORAL
Qty: 90 TABLET | Refills: 1 | Status: SHIPPED | OUTPATIENT
Start: 2021-02-09 | End: 2021-03-26 | Stop reason: SDUPTHER

## 2021-02-18 ENCOUNTER — OFFICE VISIT (OUTPATIENT)
Dept: FAMILY MEDICINE | Facility: CLINIC | Age: 61
End: 2021-02-18
Payer: COMMERCIAL

## 2021-02-18 VITALS
DIASTOLIC BLOOD PRESSURE: 82 MMHG | TEMPERATURE: 98 F | BODY MASS INDEX: 32.8 KG/M2 | WEIGHT: 173.75 LBS | OXYGEN SATURATION: 97 % | SYSTOLIC BLOOD PRESSURE: 118 MMHG | HEIGHT: 61 IN | HEART RATE: 71 BPM

## 2021-02-18 DIAGNOSIS — Z00.00 ENCOUNTER FOR BLOOD TEST FOR ROUTINE GENERAL PHYSICAL EXAMINATION: ICD-10-CM

## 2021-02-18 DIAGNOSIS — R03.0 TEMPORARY HIGH BLOOD PRESSURE: Primary | ICD-10-CM

## 2021-02-18 DIAGNOSIS — E78.49 OTHER HYPERLIPIDEMIA: ICD-10-CM

## 2021-02-18 DIAGNOSIS — Z13.1 DIABETES MELLITUS SCREENING: ICD-10-CM

## 2021-02-18 DIAGNOSIS — Z13.0 SCREENING FOR DEFICIENCY ANEMIA: ICD-10-CM

## 2021-02-18 DIAGNOSIS — Z13.29 THYROID DISORDER SCREEN: ICD-10-CM

## 2021-02-18 PROCEDURE — 3074F SYST BP LT 130 MM HG: CPT | Mod: CPTII,S$GLB,, | Performed by: NURSE PRACTITIONER

## 2021-02-18 PROCEDURE — 99999 PR PBB SHADOW E&M-EST. PATIENT-LVL III: CPT | Mod: PBBFAC,,, | Performed by: NURSE PRACTITIONER

## 2021-02-18 PROCEDURE — 99213 PR OFFICE/OUTPT VISIT, EST, LEVL III, 20-29 MIN: ICD-10-PCS | Mod: S$GLB,,, | Performed by: NURSE PRACTITIONER

## 2021-02-18 PROCEDURE — 3008F PR BODY MASS INDEX (BMI) DOCUMENTED: ICD-10-PCS | Mod: CPTII,S$GLB,, | Performed by: NURSE PRACTITIONER

## 2021-02-18 PROCEDURE — 3079F PR MOST RECENT DIASTOLIC BLOOD PRESSURE 80-89 MM HG: ICD-10-PCS | Mod: CPTII,S$GLB,, | Performed by: NURSE PRACTITIONER

## 2021-02-18 PROCEDURE — 99213 OFFICE O/P EST LOW 20 MIN: CPT | Mod: S$GLB,,, | Performed by: NURSE PRACTITIONER

## 2021-02-18 PROCEDURE — 3074F PR MOST RECENT SYSTOLIC BLOOD PRESSURE < 130 MM HG: ICD-10-PCS | Mod: CPTII,S$GLB,, | Performed by: NURSE PRACTITIONER

## 2021-02-18 PROCEDURE — 1126F AMNT PAIN NOTED NONE PRSNT: CPT | Mod: S$GLB,,, | Performed by: NURSE PRACTITIONER

## 2021-02-18 PROCEDURE — 1126F PR PAIN SEVERITY QUANTIFIED, NO PAIN PRESENT: ICD-10-PCS | Mod: S$GLB,,, | Performed by: NURSE PRACTITIONER

## 2021-02-18 PROCEDURE — 3008F BODY MASS INDEX DOCD: CPT | Mod: CPTII,S$GLB,, | Performed by: NURSE PRACTITIONER

## 2021-02-18 PROCEDURE — 99999 PR PBB SHADOW E&M-EST. PATIENT-LVL III: ICD-10-PCS | Mod: PBBFAC,,, | Performed by: NURSE PRACTITIONER

## 2021-02-18 PROCEDURE — 3079F DIAST BP 80-89 MM HG: CPT | Mod: CPTII,S$GLB,, | Performed by: NURSE PRACTITIONER

## 2021-02-19 ENCOUNTER — TELEPHONE (OUTPATIENT)
Dept: FAMILY MEDICINE | Facility: CLINIC | Age: 61
End: 2021-02-19

## 2021-02-19 DIAGNOSIS — K21.9 CHRONIC GERD: ICD-10-CM

## 2021-02-19 RX ORDER — PANTOPRAZOLE SODIUM 40 MG/1
40 TABLET, DELAYED RELEASE ORAL DAILY
Qty: 90 TABLET | Refills: 0 | Status: SHIPPED | OUTPATIENT
Start: 2021-02-19 | End: 2021-08-04 | Stop reason: SDUPTHER

## 2021-02-22 ENCOUNTER — PATIENT MESSAGE (OUTPATIENT)
Dept: SPORTS MEDICINE | Facility: CLINIC | Age: 61
End: 2021-02-22

## 2021-02-23 ENCOUNTER — TELEPHONE (OUTPATIENT)
Dept: SPORTS MEDICINE | Facility: CLINIC | Age: 61
End: 2021-02-23

## 2021-02-26 ENCOUNTER — IMMUNIZATION (OUTPATIENT)
Dept: PHARMACY | Facility: CLINIC | Age: 61
End: 2021-02-26
Payer: COMMERCIAL

## 2021-02-26 DIAGNOSIS — Z23 NEED FOR VACCINATION: Primary | ICD-10-CM

## 2021-03-04 ENCOUNTER — OFFICE VISIT (OUTPATIENT)
Dept: SPORTS MEDICINE | Facility: CLINIC | Age: 61
End: 2021-03-04
Payer: COMMERCIAL

## 2021-03-04 VITALS — WEIGHT: 170 LBS | HEIGHT: 61 IN | BODY MASS INDEX: 32.1 KG/M2

## 2021-03-04 DIAGNOSIS — Z98.890 S/P ARTHROSCOPY OF RIGHT SHOULDER: Primary | ICD-10-CM

## 2021-03-04 PROCEDURE — 99999 PR PBB SHADOW E&M-EST. PATIENT-LVL III: CPT | Mod: PBBFAC,,, | Performed by: ORTHOPAEDIC SURGERY

## 2021-03-04 PROCEDURE — 99024 PR POST-OP FOLLOW-UP VISIT: ICD-10-PCS | Mod: S$GLB,,, | Performed by: ORTHOPAEDIC SURGERY

## 2021-03-04 PROCEDURE — 3008F PR BODY MASS INDEX (BMI) DOCUMENTED: ICD-10-PCS | Mod: CPTII,S$GLB,, | Performed by: ORTHOPAEDIC SURGERY

## 2021-03-04 PROCEDURE — 1125F PR PAIN SEVERITY QUANTIFIED, PAIN PRESENT: ICD-10-PCS | Mod: S$GLB,,, | Performed by: ORTHOPAEDIC SURGERY

## 2021-03-04 PROCEDURE — 3008F BODY MASS INDEX DOCD: CPT | Mod: CPTII,S$GLB,, | Performed by: ORTHOPAEDIC SURGERY

## 2021-03-04 PROCEDURE — 99024 POSTOP FOLLOW-UP VISIT: CPT | Mod: S$GLB,,, | Performed by: ORTHOPAEDIC SURGERY

## 2021-03-04 PROCEDURE — 1125F AMNT PAIN NOTED PAIN PRSNT: CPT | Mod: S$GLB,,, | Performed by: ORTHOPAEDIC SURGERY

## 2021-03-04 PROCEDURE — 99999 PR PBB SHADOW E&M-EST. PATIENT-LVL III: ICD-10-PCS | Mod: PBBFAC,,, | Performed by: ORTHOPAEDIC SURGERY

## 2021-03-26 ENCOUNTER — IMMUNIZATION (OUTPATIENT)
Dept: PHARMACY | Facility: CLINIC | Age: 61
End: 2021-03-26
Payer: COMMERCIAL

## 2021-03-26 DIAGNOSIS — E78.49 OTHER HYPERLIPIDEMIA: ICD-10-CM

## 2021-03-26 DIAGNOSIS — Z23 NEED FOR VACCINATION: Primary | ICD-10-CM

## 2021-03-26 RX ORDER — ATORVASTATIN CALCIUM 20 MG/1
20 TABLET, FILM COATED ORAL DAILY
Qty: 90 TABLET | Refills: 1 | Status: SHIPPED | OUTPATIENT
Start: 2021-03-26 | End: 2021-08-04 | Stop reason: SDUPTHER

## 2021-03-29 ENCOUNTER — PATIENT MESSAGE (OUTPATIENT)
Dept: SPORTS MEDICINE | Facility: CLINIC | Age: 61
End: 2021-03-29

## 2021-04-15 ENCOUNTER — OFFICE VISIT (OUTPATIENT)
Dept: SPORTS MEDICINE | Facility: CLINIC | Age: 61
End: 2021-04-15
Payer: COMMERCIAL

## 2021-04-15 VITALS
SYSTOLIC BLOOD PRESSURE: 129 MMHG | HEIGHT: 61 IN | HEART RATE: 67 BPM | BODY MASS INDEX: 30.78 KG/M2 | DIASTOLIC BLOOD PRESSURE: 84 MMHG | WEIGHT: 163 LBS

## 2021-04-15 DIAGNOSIS — Z98.890 S/P ARTHROSCOPY OF RIGHT SHOULDER: Primary | ICD-10-CM

## 2021-04-15 PROCEDURE — 1125F AMNT PAIN NOTED PAIN PRSNT: CPT | Mod: S$GLB,,, | Performed by: ORTHOPAEDIC SURGERY

## 2021-04-15 PROCEDURE — 99999 PR PBB SHADOW E&M-EST. PATIENT-LVL III: ICD-10-PCS | Mod: PBBFAC,,, | Performed by: ORTHOPAEDIC SURGERY

## 2021-04-15 PROCEDURE — 1125F PR PAIN SEVERITY QUANTIFIED, PAIN PRESENT: ICD-10-PCS | Mod: S$GLB,,, | Performed by: ORTHOPAEDIC SURGERY

## 2021-04-15 PROCEDURE — 3008F PR BODY MASS INDEX (BMI) DOCUMENTED: ICD-10-PCS | Mod: CPTII,S$GLB,, | Performed by: ORTHOPAEDIC SURGERY

## 2021-04-15 PROCEDURE — 3008F BODY MASS INDEX DOCD: CPT | Mod: CPTII,S$GLB,, | Performed by: ORTHOPAEDIC SURGERY

## 2021-04-15 PROCEDURE — 99024 POSTOP FOLLOW-UP VISIT: CPT | Mod: S$GLB,,, | Performed by: ORTHOPAEDIC SURGERY

## 2021-04-15 PROCEDURE — 99024 PR POST-OP FOLLOW-UP VISIT: ICD-10-PCS | Mod: S$GLB,,, | Performed by: ORTHOPAEDIC SURGERY

## 2021-04-15 PROCEDURE — 99999 PR PBB SHADOW E&M-EST. PATIENT-LVL III: CPT | Mod: PBBFAC,,, | Performed by: ORTHOPAEDIC SURGERY

## 2021-05-07 ENCOUNTER — PATIENT MESSAGE (OUTPATIENT)
Dept: SPORTS MEDICINE | Facility: CLINIC | Age: 61
End: 2021-05-07

## 2021-05-21 ENCOUNTER — TELEPHONE (OUTPATIENT)
Dept: SPORTS MEDICINE | Facility: CLINIC | Age: 61
End: 2021-05-21

## 2021-07-06 ENCOUNTER — OFFICE VISIT (OUTPATIENT)
Dept: SPORTS MEDICINE | Facility: CLINIC | Age: 61
End: 2021-07-06
Payer: COMMERCIAL

## 2021-07-06 VITALS — HEIGHT: 61 IN | BODY MASS INDEX: 32.66 KG/M2 | WEIGHT: 173 LBS

## 2021-07-06 DIAGNOSIS — Z98.890 S/P ARTHROSCOPY OF RIGHT SHOULDER: Primary | ICD-10-CM

## 2021-07-06 PROCEDURE — 99214 OFFICE O/P EST MOD 30 MIN: CPT | Mod: S$GLB,,, | Performed by: ORTHOPAEDIC SURGERY

## 2021-07-06 PROCEDURE — 99999 PR PBB SHADOW E&M-EST. PATIENT-LVL III: CPT | Mod: PBBFAC,,, | Performed by: ORTHOPAEDIC SURGERY

## 2021-07-06 PROCEDURE — 1126F PR PAIN SEVERITY QUANTIFIED, NO PAIN PRESENT: ICD-10-PCS | Mod: S$GLB,,, | Performed by: ORTHOPAEDIC SURGERY

## 2021-07-06 PROCEDURE — 1126F AMNT PAIN NOTED NONE PRSNT: CPT | Mod: S$GLB,,, | Performed by: ORTHOPAEDIC SURGERY

## 2021-07-06 PROCEDURE — 3008F PR BODY MASS INDEX (BMI) DOCUMENTED: ICD-10-PCS | Mod: CPTII,S$GLB,, | Performed by: ORTHOPAEDIC SURGERY

## 2021-07-06 PROCEDURE — 99999 PR PBB SHADOW E&M-EST. PATIENT-LVL III: ICD-10-PCS | Mod: PBBFAC,,, | Performed by: ORTHOPAEDIC SURGERY

## 2021-07-06 PROCEDURE — 3008F BODY MASS INDEX DOCD: CPT | Mod: CPTII,S$GLB,, | Performed by: ORTHOPAEDIC SURGERY

## 2021-07-06 PROCEDURE — 99214 PR OFFICE/OUTPT VISIT, EST, LEVL IV, 30-39 MIN: ICD-10-PCS | Mod: S$GLB,,, | Performed by: ORTHOPAEDIC SURGERY

## 2021-08-04 ENCOUNTER — OFFICE VISIT (OUTPATIENT)
Dept: FAMILY MEDICINE | Facility: CLINIC | Age: 61
End: 2021-08-04
Payer: COMMERCIAL

## 2021-08-04 VITALS
DIASTOLIC BLOOD PRESSURE: 84 MMHG | SYSTOLIC BLOOD PRESSURE: 118 MMHG | HEIGHT: 61 IN | HEART RATE: 70 BPM | OXYGEN SATURATION: 96 % | WEIGHT: 175.13 LBS | BODY MASS INDEX: 33.07 KG/M2 | RESPIRATION RATE: 16 BRPM | TEMPERATURE: 98 F

## 2021-08-04 DIAGNOSIS — E66.9 CLASS 1 OBESITY WITH SERIOUS COMORBIDITY AND BODY MASS INDEX (BMI) OF 33.0 TO 33.9 IN ADULT, UNSPECIFIED OBESITY TYPE: ICD-10-CM

## 2021-08-04 DIAGNOSIS — J30.89 ENVIRONMENTAL AND SEASONAL ALLERGIES: ICD-10-CM

## 2021-08-04 DIAGNOSIS — Z12.39 ENCOUNTER FOR SCREENING FOR MALIGNANT NEOPLASM OF BREAST, UNSPECIFIED SCREENING MODALITY: ICD-10-CM

## 2021-08-04 DIAGNOSIS — E78.49 OTHER HYPERLIPIDEMIA: ICD-10-CM

## 2021-08-04 DIAGNOSIS — Z00.00 ANNUAL PHYSICAL EXAM: Primary | ICD-10-CM

## 2021-08-04 DIAGNOSIS — K21.9 CHRONIC GERD: ICD-10-CM

## 2021-08-04 PROBLEM — E66.811 CLASS 1 OBESITY WITH SERIOUS COMORBIDITY AND BODY MASS INDEX (BMI) OF 33.0 TO 33.9 IN ADULT: Status: ACTIVE | Noted: 2021-08-04

## 2021-08-04 PROBLEM — M75.101 NONTRAUMATIC TEAR OF RIGHT ROTATOR CUFF: Status: RESOLVED | Noted: 2021-01-20 | Resolved: 2021-08-04

## 2021-08-04 PROBLEM — Z78.9 IMPAIRED MOBILITY AND ACTIVITIES OF DAILY LIVING: Status: RESOLVED | Noted: 2021-01-25 | Resolved: 2021-08-04

## 2021-08-04 PROBLEM — Z74.09 IMPAIRED MOBILITY AND ACTIVITIES OF DAILY LIVING: Status: RESOLVED | Noted: 2021-01-25 | Resolved: 2021-08-04

## 2021-08-04 PROCEDURE — 1159F MED LIST DOCD IN RCRD: CPT | Mod: CPTII,S$GLB,, | Performed by: NURSE PRACTITIONER

## 2021-08-04 PROCEDURE — 1160F RVW MEDS BY RX/DR IN RCRD: CPT | Mod: CPTII,S$GLB,, | Performed by: NURSE PRACTITIONER

## 2021-08-04 PROCEDURE — 3008F PR BODY MASS INDEX (BMI) DOCUMENTED: ICD-10-PCS | Mod: CPTII,S$GLB,, | Performed by: NURSE PRACTITIONER

## 2021-08-04 PROCEDURE — 3074F SYST BP LT 130 MM HG: CPT | Mod: CPTII,S$GLB,, | Performed by: NURSE PRACTITIONER

## 2021-08-04 PROCEDURE — 99396 PR PREVENTIVE VISIT,EST,40-64: ICD-10-PCS | Mod: S$GLB,,, | Performed by: NURSE PRACTITIONER

## 2021-08-04 PROCEDURE — 3079F PR MOST RECENT DIASTOLIC BLOOD PRESSURE 80-89 MM HG: ICD-10-PCS | Mod: CPTII,S$GLB,, | Performed by: NURSE PRACTITIONER

## 2021-08-04 PROCEDURE — 99396 PREV VISIT EST AGE 40-64: CPT | Mod: S$GLB,,, | Performed by: NURSE PRACTITIONER

## 2021-08-04 PROCEDURE — 99999 PR PBB SHADOW E&M-EST. PATIENT-LVL III: ICD-10-PCS | Mod: PBBFAC,,, | Performed by: NURSE PRACTITIONER

## 2021-08-04 PROCEDURE — 3008F BODY MASS INDEX DOCD: CPT | Mod: CPTII,S$GLB,, | Performed by: NURSE PRACTITIONER

## 2021-08-04 PROCEDURE — 3074F PR MOST RECENT SYSTOLIC BLOOD PRESSURE < 130 MM HG: ICD-10-PCS | Mod: CPTII,S$GLB,, | Performed by: NURSE PRACTITIONER

## 2021-08-04 PROCEDURE — 1160F PR REVIEW ALL MEDS BY PRESCRIBER/CLIN PHARMACIST DOCUMENTED: ICD-10-PCS | Mod: CPTII,S$GLB,, | Performed by: NURSE PRACTITIONER

## 2021-08-04 PROCEDURE — 1159F PR MEDICATION LIST DOCUMENTED IN MEDICAL RECORD: ICD-10-PCS | Mod: CPTII,S$GLB,, | Performed by: NURSE PRACTITIONER

## 2021-08-04 PROCEDURE — 99999 PR PBB SHADOW E&M-EST. PATIENT-LVL III: CPT | Mod: PBBFAC,,, | Performed by: NURSE PRACTITIONER

## 2021-08-04 PROCEDURE — 3079F DIAST BP 80-89 MM HG: CPT | Mod: CPTII,S$GLB,, | Performed by: NURSE PRACTITIONER

## 2021-08-04 RX ORDER — PANTOPRAZOLE SODIUM 40 MG/1
40 TABLET, DELAYED RELEASE ORAL DAILY PRN
Qty: 90 TABLET | Refills: 0 | Status: SHIPPED | OUTPATIENT
Start: 2021-08-04 | End: 2022-12-19

## 2021-08-04 RX ORDER — ATORVASTATIN CALCIUM 40 MG/1
40 TABLET, FILM COATED ORAL DAILY
Qty: 90 TABLET | Refills: 1 | Status: SHIPPED | OUTPATIENT
Start: 2021-08-04 | End: 2022-02-08 | Stop reason: SDUPTHER

## 2021-09-29 ENCOUNTER — TELEPHONE (OUTPATIENT)
Dept: UROLOGY | Facility: CLINIC | Age: 61
End: 2021-09-29

## 2021-10-19 ENCOUNTER — PATIENT OUTREACH (OUTPATIENT)
Dept: ADMINISTRATIVE | Facility: OTHER | Age: 61
End: 2021-10-19

## 2021-10-21 ENCOUNTER — OFFICE VISIT (OUTPATIENT)
Dept: UROLOGY | Facility: CLINIC | Age: 61
End: 2021-10-21
Payer: COMMERCIAL

## 2021-10-21 VITALS
BODY MASS INDEX: 34.62 KG/M2 | WEIGHT: 181 LBS | SYSTOLIC BLOOD PRESSURE: 156 MMHG | HEART RATE: 69 BPM | DIASTOLIC BLOOD PRESSURE: 92 MMHG

## 2021-10-21 DIAGNOSIS — R10.9 FLANK PAIN: ICD-10-CM

## 2021-10-21 DIAGNOSIS — N20.0 NEPHROLITHIASIS: ICD-10-CM

## 2021-10-21 DIAGNOSIS — N20.0 KIDNEY STONE: Primary | ICD-10-CM

## 2021-10-21 LAB
BILIRUB SERPL-MCNC: ABNORMAL MG/DL
BLOOD URINE, POC: ABNORMAL
CLARITY, POC UA: ABNORMAL
COLOR, POC UA: YELLOW
GLUCOSE UR QL STRIP: ABNORMAL
KETONES UR QL STRIP: ABNORMAL
LEUKOCYTE ESTERASE URINE, POC: ABNORMAL
NITRITE, POC UA: ABNORMAL
PH, POC UA: 6
PROTEIN, POC: ABNORMAL
SPECIFIC GRAVITY, POC UA: 1.01
UROBILINOGEN, POC UA: ABNORMAL

## 2021-10-21 PROCEDURE — 81002 URINALYSIS NONAUTO W/O SCOPE: CPT | Mod: S$GLB,,, | Performed by: STUDENT IN AN ORGANIZED HEALTH CARE EDUCATION/TRAINING PROGRAM

## 2021-10-21 PROCEDURE — 1159F PR MEDICATION LIST DOCUMENTED IN MEDICAL RECORD: ICD-10-PCS | Mod: CPTII,S$GLB,, | Performed by: STUDENT IN AN ORGANIZED HEALTH CARE EDUCATION/TRAINING PROGRAM

## 2021-10-21 PROCEDURE — 1159F MED LIST DOCD IN RCRD: CPT | Mod: CPTII,S$GLB,, | Performed by: STUDENT IN AN ORGANIZED HEALTH CARE EDUCATION/TRAINING PROGRAM

## 2021-10-21 PROCEDURE — 99999 PR PBB SHADOW E&M-EST. PATIENT-LVL III: CPT | Mod: PBBFAC,,, | Performed by: STUDENT IN AN ORGANIZED HEALTH CARE EDUCATION/TRAINING PROGRAM

## 2021-10-21 PROCEDURE — 99999 PR PBB SHADOW E&M-EST. PATIENT-LVL III: ICD-10-PCS | Mod: PBBFAC,,, | Performed by: STUDENT IN AN ORGANIZED HEALTH CARE EDUCATION/TRAINING PROGRAM

## 2021-10-21 PROCEDURE — 1160F RVW MEDS BY RX/DR IN RCRD: CPT | Mod: CPTII,S$GLB,, | Performed by: STUDENT IN AN ORGANIZED HEALTH CARE EDUCATION/TRAINING PROGRAM

## 2021-10-21 PROCEDURE — 99204 OFFICE O/P NEW MOD 45 MIN: CPT | Mod: S$GLB,,, | Performed by: STUDENT IN AN ORGANIZED HEALTH CARE EDUCATION/TRAINING PROGRAM

## 2021-10-21 PROCEDURE — 3077F SYST BP >= 140 MM HG: CPT | Mod: CPTII,S$GLB,, | Performed by: STUDENT IN AN ORGANIZED HEALTH CARE EDUCATION/TRAINING PROGRAM

## 2021-10-21 PROCEDURE — 3008F BODY MASS INDEX DOCD: CPT | Mod: CPTII,S$GLB,, | Performed by: STUDENT IN AN ORGANIZED HEALTH CARE EDUCATION/TRAINING PROGRAM

## 2021-10-21 PROCEDURE — 3080F PR MOST RECENT DIASTOLIC BLOOD PRESSURE >= 90 MM HG: ICD-10-PCS | Mod: CPTII,S$GLB,, | Performed by: STUDENT IN AN ORGANIZED HEALTH CARE EDUCATION/TRAINING PROGRAM

## 2021-10-21 PROCEDURE — 3080F DIAST BP >= 90 MM HG: CPT | Mod: CPTII,S$GLB,, | Performed by: STUDENT IN AN ORGANIZED HEALTH CARE EDUCATION/TRAINING PROGRAM

## 2021-10-21 PROCEDURE — 81002 POCT URINE DIPSTICK WITHOUT MICROSCOPE: ICD-10-PCS | Mod: S$GLB,,, | Performed by: STUDENT IN AN ORGANIZED HEALTH CARE EDUCATION/TRAINING PROGRAM

## 2021-10-21 PROCEDURE — 1160F PR REVIEW ALL MEDS BY PRESCRIBER/CLIN PHARMACIST DOCUMENTED: ICD-10-PCS | Mod: CPTII,S$GLB,, | Performed by: STUDENT IN AN ORGANIZED HEALTH CARE EDUCATION/TRAINING PROGRAM

## 2021-10-21 PROCEDURE — 3077F PR MOST RECENT SYSTOLIC BLOOD PRESSURE >= 140 MM HG: ICD-10-PCS | Mod: CPTII,S$GLB,, | Performed by: STUDENT IN AN ORGANIZED HEALTH CARE EDUCATION/TRAINING PROGRAM

## 2021-10-21 PROCEDURE — 3008F PR BODY MASS INDEX (BMI) DOCUMENTED: ICD-10-PCS | Mod: CPTII,S$GLB,, | Performed by: STUDENT IN AN ORGANIZED HEALTH CARE EDUCATION/TRAINING PROGRAM

## 2021-10-21 PROCEDURE — 87086 URINE CULTURE/COLONY COUNT: CPT | Performed by: STUDENT IN AN ORGANIZED HEALTH CARE EDUCATION/TRAINING PROGRAM

## 2021-10-21 PROCEDURE — 99204 PR OFFICE/OUTPT VISIT, NEW, LEVL IV, 45-59 MIN: ICD-10-PCS | Mod: S$GLB,,, | Performed by: STUDENT IN AN ORGANIZED HEALTH CARE EDUCATION/TRAINING PROGRAM

## 2021-10-21 RX ORDER — TAMSULOSIN HYDROCHLORIDE 0.4 MG/1
0.4 CAPSULE ORAL DAILY
Qty: 30 CAPSULE | Refills: 1 | Status: SHIPPED | OUTPATIENT
Start: 2021-10-21 | End: 2022-02-08

## 2021-10-22 LAB — BACTERIA UR CULT: NO GROWTH

## 2021-10-27 ENCOUNTER — TELEPHONE (OUTPATIENT)
Dept: UROLOGY | Facility: CLINIC | Age: 61
End: 2021-10-27
Payer: COMMERCIAL

## 2021-11-15 ENCOUNTER — OFFICE VISIT (OUTPATIENT)
Dept: UROLOGY | Facility: CLINIC | Age: 61
End: 2021-11-15
Payer: COMMERCIAL

## 2021-11-15 VITALS
SYSTOLIC BLOOD PRESSURE: 128 MMHG | BODY MASS INDEX: 33.14 KG/M2 | HEART RATE: 69 BPM | DIASTOLIC BLOOD PRESSURE: 81 MMHG | WEIGHT: 173.31 LBS

## 2021-11-15 DIAGNOSIS — N20.0 NEPHROLITHIASIS: Primary | ICD-10-CM

## 2021-11-15 PROCEDURE — 99214 PR OFFICE/OUTPT VISIT, EST, LEVL IV, 30-39 MIN: ICD-10-PCS | Mod: S$GLB,,, | Performed by: STUDENT IN AN ORGANIZED HEALTH CARE EDUCATION/TRAINING PROGRAM

## 2021-11-15 PROCEDURE — 99999 PR PBB SHADOW E&M-EST. PATIENT-LVL III: ICD-10-PCS | Mod: PBBFAC,,, | Performed by: STUDENT IN AN ORGANIZED HEALTH CARE EDUCATION/TRAINING PROGRAM

## 2021-11-15 PROCEDURE — 3008F PR BODY MASS INDEX (BMI) DOCUMENTED: ICD-10-PCS | Mod: CPTII,S$GLB,, | Performed by: STUDENT IN AN ORGANIZED HEALTH CARE EDUCATION/TRAINING PROGRAM

## 2021-11-15 PROCEDURE — 99214 OFFICE O/P EST MOD 30 MIN: CPT | Mod: S$GLB,,, | Performed by: STUDENT IN AN ORGANIZED HEALTH CARE EDUCATION/TRAINING PROGRAM

## 2021-11-15 PROCEDURE — 1159F MED LIST DOCD IN RCRD: CPT | Mod: CPTII,S$GLB,, | Performed by: STUDENT IN AN ORGANIZED HEALTH CARE EDUCATION/TRAINING PROGRAM

## 2021-11-15 PROCEDURE — 3079F DIAST BP 80-89 MM HG: CPT | Mod: CPTII,S$GLB,, | Performed by: STUDENT IN AN ORGANIZED HEALTH CARE EDUCATION/TRAINING PROGRAM

## 2021-11-15 PROCEDURE — 99999 PR PBB SHADOW E&M-EST. PATIENT-LVL III: CPT | Mod: PBBFAC,,, | Performed by: STUDENT IN AN ORGANIZED HEALTH CARE EDUCATION/TRAINING PROGRAM

## 2021-11-15 PROCEDURE — 1159F PR MEDICATION LIST DOCUMENTED IN MEDICAL RECORD: ICD-10-PCS | Mod: CPTII,S$GLB,, | Performed by: STUDENT IN AN ORGANIZED HEALTH CARE EDUCATION/TRAINING PROGRAM

## 2021-11-15 PROCEDURE — 3074F PR MOST RECENT SYSTOLIC BLOOD PRESSURE < 130 MM HG: ICD-10-PCS | Mod: CPTII,S$GLB,, | Performed by: STUDENT IN AN ORGANIZED HEALTH CARE EDUCATION/TRAINING PROGRAM

## 2021-11-15 PROCEDURE — 1160F RVW MEDS BY RX/DR IN RCRD: CPT | Mod: CPTII,S$GLB,, | Performed by: STUDENT IN AN ORGANIZED HEALTH CARE EDUCATION/TRAINING PROGRAM

## 2021-11-15 PROCEDURE — 3079F PR MOST RECENT DIASTOLIC BLOOD PRESSURE 80-89 MM HG: ICD-10-PCS | Mod: CPTII,S$GLB,, | Performed by: STUDENT IN AN ORGANIZED HEALTH CARE EDUCATION/TRAINING PROGRAM

## 2021-11-15 PROCEDURE — 3074F SYST BP LT 130 MM HG: CPT | Mod: CPTII,S$GLB,, | Performed by: STUDENT IN AN ORGANIZED HEALTH CARE EDUCATION/TRAINING PROGRAM

## 2021-11-15 PROCEDURE — 1160F PR REVIEW ALL MEDS BY PRESCRIBER/CLIN PHARMACIST DOCUMENTED: ICD-10-PCS | Mod: CPTII,S$GLB,, | Performed by: STUDENT IN AN ORGANIZED HEALTH CARE EDUCATION/TRAINING PROGRAM

## 2021-11-15 PROCEDURE — 3008F BODY MASS INDEX DOCD: CPT | Mod: CPTII,S$GLB,, | Performed by: STUDENT IN AN ORGANIZED HEALTH CARE EDUCATION/TRAINING PROGRAM

## 2021-11-23 ENCOUNTER — TELEPHONE (OUTPATIENT)
Dept: FAMILY MEDICINE | Facility: CLINIC | Age: 61
End: 2021-11-23
Payer: COMMERCIAL

## 2021-12-16 ENCOUNTER — TELEPHONE (OUTPATIENT)
Dept: FAMILY MEDICINE | Facility: CLINIC | Age: 61
End: 2021-12-16
Payer: COMMERCIAL

## 2022-02-08 ENCOUNTER — OFFICE VISIT (OUTPATIENT)
Dept: FAMILY MEDICINE | Facility: CLINIC | Age: 62
End: 2022-02-08
Payer: COMMERCIAL

## 2022-02-08 VITALS
DIASTOLIC BLOOD PRESSURE: 74 MMHG | RESPIRATION RATE: 16 BRPM | BODY MASS INDEX: 31.43 KG/M2 | HEART RATE: 67 BPM | HEIGHT: 61 IN | OXYGEN SATURATION: 98 % | TEMPERATURE: 98 F | WEIGHT: 166.44 LBS | SYSTOLIC BLOOD PRESSURE: 118 MMHG

## 2022-02-08 DIAGNOSIS — Z13.29 THYROID DISORDER SCREEN: ICD-10-CM

## 2022-02-08 DIAGNOSIS — J30.89 ENVIRONMENTAL AND SEASONAL ALLERGIES: ICD-10-CM

## 2022-02-08 DIAGNOSIS — E78.49 OTHER HYPERLIPIDEMIA: Primary | ICD-10-CM

## 2022-02-08 DIAGNOSIS — K76.0 HEPATIC STEATOSIS: ICD-10-CM

## 2022-02-08 DIAGNOSIS — Z87.442 PERSONAL HISTORY OF KIDNEY STONES: ICD-10-CM

## 2022-02-08 DIAGNOSIS — E66.9 CLASS 1 OBESITY WITH SERIOUS COMORBIDITY AND BODY MASS INDEX (BMI) OF 31.0 TO 31.9 IN ADULT, UNSPECIFIED OBESITY TYPE: ICD-10-CM

## 2022-02-08 DIAGNOSIS — K21.9 CHRONIC GERD: ICD-10-CM

## 2022-02-08 DIAGNOSIS — Z13.0 SCREENING FOR DEFICIENCY ANEMIA: ICD-10-CM

## 2022-02-08 DIAGNOSIS — K76.89 LIVER CYST: ICD-10-CM

## 2022-02-08 DIAGNOSIS — Z00.00 ENCOUNTER FOR BLOOD TEST FOR ROUTINE GENERAL PHYSICAL EXAMINATION: ICD-10-CM

## 2022-02-08 DIAGNOSIS — Z13.1 DIABETES MELLITUS SCREENING: ICD-10-CM

## 2022-02-08 PROBLEM — M25.611 IMPAIRED RANGE OF MOTION OF RIGHT SHOULDER: Status: RESOLVED | Noted: 2021-01-25 | Resolved: 2022-02-08

## 2022-02-08 PROCEDURE — 99999 PR PBB SHADOW E&M-EST. PATIENT-LVL III: CPT | Mod: PBBFAC,,, | Performed by: NURSE PRACTITIONER

## 2022-02-08 PROCEDURE — 1159F PR MEDICATION LIST DOCUMENTED IN MEDICAL RECORD: ICD-10-PCS | Mod: CPTII,S$GLB,, | Performed by: NURSE PRACTITIONER

## 2022-02-08 PROCEDURE — 3074F SYST BP LT 130 MM HG: CPT | Mod: CPTII,S$GLB,, | Performed by: NURSE PRACTITIONER

## 2022-02-08 PROCEDURE — 3078F DIAST BP <80 MM HG: CPT | Mod: CPTII,S$GLB,, | Performed by: NURSE PRACTITIONER

## 2022-02-08 PROCEDURE — 99214 PR OFFICE/OUTPT VISIT, EST, LEVL IV, 30-39 MIN: ICD-10-PCS | Mod: S$GLB,,, | Performed by: NURSE PRACTITIONER

## 2022-02-08 PROCEDURE — 1160F RVW MEDS BY RX/DR IN RCRD: CPT | Mod: CPTII,S$GLB,, | Performed by: NURSE PRACTITIONER

## 2022-02-08 PROCEDURE — 3008F PR BODY MASS INDEX (BMI) DOCUMENTED: ICD-10-PCS | Mod: CPTII,S$GLB,, | Performed by: NURSE PRACTITIONER

## 2022-02-08 PROCEDURE — 3074F PR MOST RECENT SYSTOLIC BLOOD PRESSURE < 130 MM HG: ICD-10-PCS | Mod: CPTII,S$GLB,, | Performed by: NURSE PRACTITIONER

## 2022-02-08 PROCEDURE — 3008F BODY MASS INDEX DOCD: CPT | Mod: CPTII,S$GLB,, | Performed by: NURSE PRACTITIONER

## 2022-02-08 PROCEDURE — 3078F PR MOST RECENT DIASTOLIC BLOOD PRESSURE < 80 MM HG: ICD-10-PCS | Mod: CPTII,S$GLB,, | Performed by: NURSE PRACTITIONER

## 2022-02-08 PROCEDURE — 1160F PR REVIEW ALL MEDS BY PRESCRIBER/CLIN PHARMACIST DOCUMENTED: ICD-10-PCS | Mod: CPTII,S$GLB,, | Performed by: NURSE PRACTITIONER

## 2022-02-08 PROCEDURE — 1159F MED LIST DOCD IN RCRD: CPT | Mod: CPTII,S$GLB,, | Performed by: NURSE PRACTITIONER

## 2022-02-08 PROCEDURE — 99214 OFFICE O/P EST MOD 30 MIN: CPT | Mod: S$GLB,,, | Performed by: NURSE PRACTITIONER

## 2022-02-08 PROCEDURE — 99999 PR PBB SHADOW E&M-EST. PATIENT-LVL III: ICD-10-PCS | Mod: PBBFAC,,, | Performed by: NURSE PRACTITIONER

## 2022-02-08 RX ORDER — ATORVASTATIN CALCIUM 40 MG/1
40 TABLET, FILM COATED ORAL DAILY
Qty: 90 TABLET | Refills: 1 | Status: SHIPPED | OUTPATIENT
Start: 2022-02-08 | End: 2022-08-19 | Stop reason: SDUPTHER

## 2022-02-08 NOTE — PROGRESS NOTES
Subjective:       Patient ID: Hallie Blaek is a 61 y.o. female.    Chief Complaint: Follow-up (6 months F/U) and Medication Refill    HPI    Patient is a 61 year old white female with Hyperlipidemia, Chronic GERD with small hiatal hernia seen on EGD, chronic allergies, history of acute stress reaction, S/P Rotator Cuff Tear to right shoulder on 1/20/2021, Hepatic Steatosis and benign simple liver cyst seen on CT 10/2021, and personal history of kidney stones followed by Dr. Odonnell that is here today for 6 month follow up with fasting lab results.     Patient has Hyperlipidemia and takes Atorvastatin 40 mg daily. LDL 76.6.     Patient has chronic GERD.  She had her last EGD in July 2016 that showed grade B reflux esophagitis and a small hiatus hernia with esophagus dilated by Dr. Contreras. She reports taking Protonix around twice weekly.       Patient has chronic allergies. Takes Flonase prn.     Patient has personal history of kidney stones followed by Dr. Odonnell.    Patient has Hepatic steatosis with simple liver cyst seen on CT scan 10/2021.  Liver enzymes are normal and patient is working on weight loss - she is on weight watchers.  Component      Latest Ref Rng & Units 2/3/2022 7/14/2021 12/11/2020 9/25/2020   Sodium      136 - 145 mmol/L 146 (H) 144 143 145   Potassium      3.5 - 5.1 mmol/L 4.2 4.1 4.3 4.6   Chloride      95 - 110 mmol/L 104 107 106 108   CO2      23 - 29 mmol/L 27 28 27 28   Glucose      70 - 110 mg/dL 90 97 95 102   BUN      7 - 17 mg/dL 16 22 (H) 16 15   Creatinine      0.50 - 1.40 mg/dL 0.63 0.67 0.64 0.73   Calcium      8.7 - 10.5 mg/dL 9.5 9.7 9.5 9.8   PROTEIN TOTAL      6.0 - 8.4 g/dL 7.3 7.4 7.0 7.6   Albumin      3.5 - 5.2 g/dL 4.6 4.5 4.4 4.4   BILIRUBIN TOTAL      0.1 - 1.0 mg/dL 0.5 0.6 0.6 0.5   Alkaline Phosphatase      38 - 126 U/L 115 120 122 105   AST      15 - 46 U/L 36 31 34 34   ALT      10 - 44 U/L 32 25 28 30   Anion Gap      8 - 16 mmol/L 15 9 10 9   eGFR if African  American      >60 mL/min/1.73 m:2 >60.0 >60.0 >60.0 >60.0   eGFR if non African American      >60 mL/min/1.73 m:2 >60.0 >60.0 >60.0 >60.0   Cholesterol      120 - 199 mg/dL 144 156 176 189   Triglycerides      30 - 150 mg/dL 87 109 211 (H) 104   HDL      40 - 75 mg/dL 50 46 43 53   LDL Cholesterol External      63.0 - 159.0 mg/dL 76.6 88.2 90.8 115.2   HDL/Cholesterol Ratio      20.0 - 50.0 % 34.7 29.5 24.4 28.0   Total Cholesterol/HDL Ratio      2.0 - 5.0 2.9 3.4 4.1 3.6   Non-HDL Cholesterol      mg/dL 94 110 133 136     Past Medical History:   Diagnosis Date    GERD (gastroesophageal reflux disease)     Hyperlipidemia     Kidney stone     Liver cyst 10/21/2021    9 mm hepatic cyst - typically benign and requires no further workup.    Urinary tract infection        Past Surgical History:   Procedure Laterality Date    ARTHROSCOPIC DEBRIDEMENT OF SHOULDER Right 2021    Procedure: DEBRIDEMENT LABRUM AND CALCIUM DEPOSIT, SHOULDER, ARTHROSCOPIC;  Surgeon: Thaddeus Rider MD;  Location: [facility] OR;  Service: Orthopedics;  Laterality: Right;    ARTHROSCOPIC REPAIR OF ROTATOR CUFF OF SHOULDER Right 2021    Procedure: REPAIR, ROTATOR CUFF, ARTHROSCOPIC;  Surgeon: Thaddeus Rider MD;  Location: [facility] OR;  Service: Orthopedics;  Laterality: Right;  regional w/catheter (interscalene)    COLONOSCOPY  2016    normal - repeat in 10 year - Dr. Damon Contreras = [facility]    CYSTOSCOPY W/ LASER LITHOTRIPSY      ESOPHAGOGASTRODUODENOSCOPY  2016    Dr. Contreras - Grade B reflux esophagitis and small hiatus hernia    TONSILLECTOMY         Family History   Problem Relation Age of Onset    Heart disease Mother         passed age 83 heart attack    Diabetes Mother     Diabetes Father     Heart disease Father         CABG around age 65;  heart failure at 68    No Known Problems Sister     Hypertension Brother     Hyperlipidemia Brother     Diabetes Brother     Thyroid  disease Sister     Hyperlipidemia Sister     Diabetes Brother     Thyroid disease Brother     Myasthenia gravis Brother     No Known Problems Daughter     No Known Problems Daughter        Social History     Socioeconomic History    Marital status:    Occupational History    Occupation:    Tobacco Use    Smoking status: Never Smoker    Smokeless tobacco: Never Used   Substance and Sexual Activity    Alcohol use: Yes     Comment: social    Drug use: No       Review of Systems   Constitutional: Negative for appetite change, chills, fatigue, fever and unexpected weight change.   HENT: Negative for congestion, ear pain, mouth sores, nosebleeds, postnasal drip, rhinorrhea, sinus pressure, sneezing, sore throat, trouble swallowing and voice change.    Eyes: Negative for photophobia, pain, discharge, redness, itching and visual disturbance.   Respiratory: Negative for cough, chest tightness and shortness of breath.    Cardiovascular: Negative for chest pain, palpitations and leg swelling.   Gastrointestinal: Negative for abdominal pain, blood in stool, constipation, diarrhea, nausea and vomiting.   Genitourinary: Negative for dysuria, frequency, hematuria and urgency.   Musculoskeletal: Negative for arthralgias, back pain, joint swelling and myalgias.   Skin: Negative for color change and rash.   Allergic/Immunologic: Negative for immunocompromised state.   Neurological: Negative for dizziness, seizures, syncope, weakness and headaches.   Hematological: Negative for adenopathy. Does not bruise/bleed easily.   Psychiatric/Behavioral: Negative for agitation, dysphoric mood, sleep disturbance and suicidal ideas. The patient is not nervous/anxious.          Objective:     Vitals:    02/08/22 0745   BP: 118/74   BP Location: Left arm   Patient Position: Sitting   BP Method: Large (Manual)   Pulse: 67   Resp: 16   Temp: 97.9 °F (36.6 °C)   TempSrc: Temporal   SpO2: 98%   Weight: 75.5 kg (166 lb 7.2  "oz)   Height: 5' 0.63" (1.54 m)          Physical Exam  Constitutional:       General: She is not in acute distress.     Appearance: She is well-developed. She is obese. She is not ill-appearing, toxic-appearing or diaphoretic.      Comments: + obesity. Body mass index is 31.84 kg/m².         HENT:      Head: Normocephalic and atraumatic.      Right Ear: External ear normal. Tympanic membrane is scarred and perforated.      Left Ear: External ear normal. Tympanic membrane is scarred and perforated.      Ears:     Eyes:      Pupils: Pupils are equal, round, and reactive to light.   Neck:      Thyroid: No thyromegaly.      Trachea: No tracheal deviation.   Cardiovascular:      Rate and Rhythm: Normal rate and regular rhythm.      Heart sounds: Normal heart sounds. No murmur heard.      Pulmonary:      Effort: Pulmonary effort is normal. No respiratory distress.      Breath sounds: Normal breath sounds.   Abdominal:      General: Bowel sounds are normal. There is no distension.      Palpations: Abdomen is soft. There is no mass.      Tenderness: There is no abdominal tenderness. There is no guarding or rebound.      Hernia: No hernia is present.   Musculoskeletal:      Cervical back: Normal range of motion and neck supple.   Lymphadenopathy:      Cervical: No cervical adenopathy.   Skin:     General: Skin is warm and dry.      Findings: No rash.   Neurological:      Mental Status: She is alert and oriented to person, place, and time.      Cranial Nerves: No cranial nerve deficit.      Coordination: Coordination normal.   Psychiatric:         Mood and Affect: Mood normal.         Speech: Speech normal.         Behavior: Behavior normal. Behavior is cooperative.         Thought Content: Thought content normal. Thought content does not include suicidal ideation. Thought content does not include suicidal plan.           Assessment:         ICD-10-CM ICD-9-CM   1. Other hyperlipidemia  E78.49 272.4   2. Chronic GERD  K21.9 " 530.81   3. Class 1 obesity with serious comorbidity and body mass index (BMI) of 31.0 to 31.9 in adult, unspecified obesity type  E66.9 278.00    Z68.31 V85.31   4. Environmental and seasonal allergies  J30.89 477.8   5. Hepatic steatosis  K76.0 571.8   6. Liver cyst  K76.89 573.8   7. Personal history of kidney stones  Z87.442 V13.01       Plan:       Other hyperlipidemia  Continue current medication(s).  Follow up in 6 months.  -     atorvastatin (LIPITOR) 40 MG tablet; Take 1 tablet (40 mg total) by mouth once daily.  Dispense: 90 tablet; Refill: 1    Chronic GERD  -  Pantoprazole prn    Class 1 obesity with serious comorbidity and body mass index (BMI) of 31.0 to 31.9 in adult, unspecified obesity type  -  On Weight Watchers    Environmental and seasonal allergies  -  Flonase prn    Hepatic steatosis  -  Working on weight loss    Liver cyst  -  Simple cyst and does not require further imaging    Personal history of kidney stones  -  Followed by Urology DrIrineo      Follow up in about 6 months (around 8/8/2022) for fasting labs and WELLNESS EXAM.     Patient's Medications   New Prescriptions    No medications on file   Previous Medications    ATORVASTATIN (LIPITOR) 40 MG TABLET    Take 1 tablet (40 mg total) by mouth once daily.    PANTOPRAZOLE (PROTONIX) 40 MG TABLET    Take 1 tablet (40 mg total) by mouth daily as needed (acid reflux).   Modified Medications    No medications on file   Discontinued Medications    TAMSULOSIN (FLOMAX) 0.4 MG CAP    Take 1 capsule (0.4 mg total) by mouth once daily.

## 2022-04-06 ENCOUNTER — OFFICE VISIT (OUTPATIENT)
Dept: FAMILY MEDICINE | Facility: CLINIC | Age: 62
End: 2022-04-06
Payer: COMMERCIAL

## 2022-04-06 ENCOUNTER — TELEPHONE (OUTPATIENT)
Dept: FAMILY MEDICINE | Facility: CLINIC | Age: 62
End: 2022-04-06

## 2022-04-06 VITALS
HEIGHT: 61 IN | WEIGHT: 165.44 LBS | BODY MASS INDEX: 31.23 KG/M2 | TEMPERATURE: 98 F | DIASTOLIC BLOOD PRESSURE: 76 MMHG | SYSTOLIC BLOOD PRESSURE: 118 MMHG | OXYGEN SATURATION: 96 % | HEART RATE: 75 BPM

## 2022-04-06 DIAGNOSIS — Z12.4 CERVICAL CANCER SCREENING: ICD-10-CM

## 2022-04-06 DIAGNOSIS — J02.9 PHARYNGITIS, UNSPECIFIED ETIOLOGY: Primary | ICD-10-CM

## 2022-04-06 LAB
CTP QC/QA: YES
MOLECULAR STREP A: NEGATIVE

## 2022-04-06 PROCEDURE — 87070 CULTURE OTHR SPECIMN AEROBIC: CPT | Performed by: NURSE PRACTITIONER

## 2022-04-06 PROCEDURE — 99214 PR OFFICE/OUTPT VISIT, EST, LEVL IV, 30-39 MIN: ICD-10-PCS | Mod: S$GLB,,, | Performed by: NURSE PRACTITIONER

## 2022-04-06 PROCEDURE — 3078F DIAST BP <80 MM HG: CPT | Mod: CPTII,S$GLB,, | Performed by: NURSE PRACTITIONER

## 2022-04-06 PROCEDURE — 1159F PR MEDICATION LIST DOCUMENTED IN MEDICAL RECORD: ICD-10-PCS | Mod: CPTII,S$GLB,, | Performed by: NURSE PRACTITIONER

## 2022-04-06 PROCEDURE — 3078F PR MOST RECENT DIASTOLIC BLOOD PRESSURE < 80 MM HG: ICD-10-PCS | Mod: CPTII,S$GLB,, | Performed by: NURSE PRACTITIONER

## 2022-04-06 PROCEDURE — 99214 OFFICE O/P EST MOD 30 MIN: CPT | Mod: S$GLB,,, | Performed by: NURSE PRACTITIONER

## 2022-04-06 PROCEDURE — 1160F PR REVIEW ALL MEDS BY PRESCRIBER/CLIN PHARMACIST DOCUMENTED: ICD-10-PCS | Mod: CPTII,S$GLB,, | Performed by: NURSE PRACTITIONER

## 2022-04-06 PROCEDURE — 3074F SYST BP LT 130 MM HG: CPT | Mod: CPTII,S$GLB,, | Performed by: NURSE PRACTITIONER

## 2022-04-06 PROCEDURE — 1160F RVW MEDS BY RX/DR IN RCRD: CPT | Mod: CPTII,S$GLB,, | Performed by: NURSE PRACTITIONER

## 2022-04-06 PROCEDURE — 87651 POCT STREP A MOLECULAR: ICD-10-PCS | Mod: QW,S$GLB,, | Performed by: NURSE PRACTITIONER

## 2022-04-06 PROCEDURE — 3008F PR BODY MASS INDEX (BMI) DOCUMENTED: ICD-10-PCS | Mod: CPTII,S$GLB,, | Performed by: NURSE PRACTITIONER

## 2022-04-06 PROCEDURE — 3074F PR MOST RECENT SYSTOLIC BLOOD PRESSURE < 130 MM HG: ICD-10-PCS | Mod: CPTII,S$GLB,, | Performed by: NURSE PRACTITIONER

## 2022-04-06 PROCEDURE — 99999 PR PBB SHADOW E&M-EST. PATIENT-LVL IV: ICD-10-PCS | Mod: PBBFAC,,, | Performed by: NURSE PRACTITIONER

## 2022-04-06 PROCEDURE — 3008F BODY MASS INDEX DOCD: CPT | Mod: CPTII,S$GLB,, | Performed by: NURSE PRACTITIONER

## 2022-04-06 PROCEDURE — 87651 STREP A DNA AMP PROBE: CPT | Mod: QW,S$GLB,, | Performed by: NURSE PRACTITIONER

## 2022-04-06 PROCEDURE — 99999 PR PBB SHADOW E&M-EST. PATIENT-LVL IV: CPT | Mod: PBBFAC,,, | Performed by: NURSE PRACTITIONER

## 2022-04-06 PROCEDURE — 1159F MED LIST DOCD IN RCRD: CPT | Mod: CPTII,S$GLB,, | Performed by: NURSE PRACTITIONER

## 2022-04-06 RX ORDER — IBUPROFEN 600 MG/1
600 TABLET ORAL 3 TIMES DAILY
Qty: 30 TABLET | Refills: 0 | Status: SHIPPED | OUTPATIENT
Start: 2022-04-06 | End: 2022-06-01

## 2022-04-06 NOTE — PROGRESS NOTES
"Subjective:       Patient ID: Hallie Blake is a 61 y.o. female.    Chief Complaint: Sore Throat (X 2-3 days.  OTC Zyrtec D and gargling with salt water) and Otalgia (Bilateral X 2-3 days sounds "muffled ")    Patient is a 61 year old white female with Hyperlipidemia, Chronic GERD with small hiatal hernia seen on EGD, chronic allergies, history of acute stress reaction, S/P Rotator Cuff Tear to right shoulder on 1/20/2021, Hepatic Steatosis and benign simple liver cyst seen on CT 10/2021, and personal history of kidney stones followed by Dr. Odonnell that is here today for complaint of sore throat with pain with swallowing and bilateral ear pressure for past couple days.  No fever, no congestion.    Sore Throat   This is a new problem. Episode onset: past 2 days. The problem has been rapidly worsening. The pain is worse on the left side. There has been no fever. The pain is at a severity of 9/10. Associated symptoms include ear pain and a plugged ear sensation. Pertinent negatives include no congestion, coughing, drooling, ear discharge, shortness of breath, swollen glands or trouble swallowing. Associated symptoms comments: Pain with swallowing but no trouble swallowing.  Left throat pain much worse than right.  Also reports ear pressure to both ears.  No fever.  NO nasal congestion, no cough. She has had no exposure to strep or mono. She has tried gargles (zyrtec D) for the symptoms. The treatment provided no relief.   Otalgia   There is pain in both ears. This is a new problem. The current episode started yesterday. The problem has been waxing and waning. There has been no fever. The pain is at a severity of 3/10. Associated symptoms include a sore throat. Pertinent negatives include no coughing, ear discharge or rhinorrhea. She has tried nothing for the symptoms.       Review of Systems   HENT: Positive for ear pain and sore throat. Negative for congestion, drooling, ear discharge, rhinorrhea and trouble " "swallowing.    Respiratory: Negative for cough and shortness of breath.          Objective:     Vitals:    04/06/22 0905   BP: 118/76   BP Location: Left arm   Patient Position: Sitting   BP Method: Medium (Manual)   Pulse: 75   Temp: 98.4 °F (36.9 °C)   TempSrc: Oral   SpO2: 96%   Weight: 75 kg (165 lb 7.3 oz)   Height: 5' 0.76" (1.543 m)          Physical Exam  Constitutional:       General: She is not in acute distress.     Appearance: She is well-developed. She is obese. She is not ill-appearing, toxic-appearing or diaphoretic.      Comments: + obesity. Body mass index is 31.51 kg/m².       HENT:      Head: Normocephalic and atraumatic.      Right Ear: External ear normal. Tympanic membrane is scarred and perforated.      Left Ear: External ear normal. Tympanic membrane is scarred and perforated.      Ears:        Nose: No congestion or rhinorrhea.      Mouth/Throat:      Pharynx: Posterior oropharyngeal erythema present. No pharyngeal swelling, oropharyngeal exudate or uvula swelling.      Comments: S/p tonsillectomy as a child  Eyes:      Pupils: Pupils are equal, round, and reactive to light.   Neck:      Thyroid: No thyromegaly.      Trachea: No tracheal deviation.   Cardiovascular:      Rate and Rhythm: Normal rate and regular rhythm.      Heart sounds: Normal heart sounds. No murmur heard.  Pulmonary:      Effort: Pulmonary effort is normal. No respiratory distress.      Breath sounds: Normal breath sounds.   Abdominal:      General: Bowel sounds are normal. There is no distension.      Palpations: Abdomen is soft. There is no mass.      Tenderness: There is no abdominal tenderness. There is no guarding or rebound.      Hernia: No hernia is present.   Musculoskeletal:      Cervical back: Normal range of motion and neck supple.   Lymphadenopathy:      Cervical: No cervical adenopathy.   Skin:     General: Skin is warm and dry.      Findings: No rash.   Neurological:      Mental Status: She is alert and " oriented to person, place, and time.      Cranial Nerves: No cranial nerve deficit.      Coordination: Coordination normal.   Psychiatric:         Mood and Affect: Mood normal.         Speech: Speech normal.         Behavior: Behavior normal. Behavior is cooperative.         Thought Content: Thought content normal. Thought content does not include suicidal ideation. Thought content does not include suicidal plan.         Office Visit on 04/06/2022   Component Date Value Ref Range Status    Molecular Strep A, POC 04/06/2022 Negative  Negative Final     Acceptable 04/06/2022 Yes   Final       Assessment:         ICD-10-CM ICD-9-CM   1. Pharyngitis, unspecified etiology  J02.9 462   2. Cervical cancer screening  Z12.4 V76.2       Plan:       Pharyngitis, unspecified etiology  -  Strep screen negative - likely viral pharyngitis.  Will treat with ibuprofen and magic mouthwash.  See printed handout on pharyngitis home instructions.  -  Throat culture was sent to further evaluate.  -     POCT Strep A, Molecular  -     Throat culture  -     diphenhydrAMINE-aluminum-magnesium hydroxide-simethicone-LIDOcaine HCl 2%; Swish and swallow 15 mLs every 4 (four) hours as needed (throat pain).  Dispense: 120 mL; Refill: 0  -     ibuprofen (ADVIL,MOTRIN) 600 MG tablet; Take 1 tablet (600 mg total) by mouth 3 (three) times daily.  Dispense: 30 tablet; Refill: 0    Cervical cancer screening  Her GYN MD Dr. Abreu has retired - referral placed for PAP visit.  -     Ambulatory referral/consult to Obstetrics / Gynecology; Future; Expected date: 04/13/2022      Follow up if symptoms worsen or fail to improve.     Patient's Medications   New Prescriptions    DIPHENHYDRAMINE-ALUMINUM-MAGNESIUM HYDROXIDE-SIMETHICONE-LIDOCAINE HCL 2%    Swish and swallow 15 mLs every 4 (four) hours as needed (throat pain).    IBUPROFEN (ADVIL,MOTRIN) 600 MG TABLET    Take 1 tablet (600 mg total) by mouth 3 (three) times daily.   Previous  Medications    ATORVASTATIN (LIPITOR) 40 MG TABLET    Take 1 tablet (40 mg total) by mouth once daily.    PANTOPRAZOLE (PROTONIX) 40 MG TABLET    Take 1 tablet (40 mg total) by mouth daily as needed (acid reflux).   Modified Medications    No medications on file   Discontinued Medications    No medications on file       Past Medical History:   Diagnosis Date    GERD (gastroesophageal reflux disease)     Hyperlipidemia     Kidney stone     Liver cyst 10/21/2021    9 mm hepatic cyst - typically benign and requires no further workup.    Urinary tract infection        Past Surgical History:   Procedure Laterality Date    ARTHROSCOPIC DEBRIDEMENT OF SHOULDER Right 2021    Procedure: DEBRIDEMENT LABRUM AND CALCIUM DEPOSIT, SHOULDER, ARTHROSCOPIC;  Surgeon: Thaddeus Rider MD;  Location: [facility] OR;  Service: Orthopedics;  Laterality: Right;    ARTHROSCOPIC REPAIR OF ROTATOR CUFF OF SHOULDER Right 2021    Procedure: REPAIR, ROTATOR CUFF, ARTHROSCOPIC;  Surgeon: Thaddeus Rider MD;  Location: [facility] OR;  Service: Orthopedics;  Laterality: Right;  regional w/catheter (interscalene)    COLONOSCOPY  2016    normal - repeat in 10 year - Dr. Damon Contreras = [facility]    CYSTOSCOPY W/ LASER LITHOTRIPSY      ESOPHAGOGASTRODUODENOSCOPY  2016    Dr. Contreras - Grade B reflux esophagitis and small hiatus hernia    TONSILLECTOMY         Family History   Problem Relation Age of Onset    Heart disease Mother         passed age 83 heart attack    Diabetes Mother     Diabetes Father     Heart disease Father         CABG around age 65;  heart failure at 68    No Known Problems Sister     Hypertension Brother     Hyperlipidemia Brother     Diabetes Brother     Thyroid disease Sister     Hyperlipidemia Sister     Diabetes Brother     Thyroid disease Brother     Myasthenia gravis Brother     No Known Problems Daughter     No Known Problems Daughter        Social  History     Socioeconomic History    Marital status:    Occupational History    Occupation:    Tobacco Use    Smoking status: Never Smoker    Smokeless tobacco: Never Used   Substance and Sexual Activity    Alcohol use: Yes     Comment: social    Drug use: No

## 2022-04-06 NOTE — TELEPHONE ENCOUNTER
Called and spoke with pt in regards of her message. Pt states that her throat hurts really bad and she would like an antibiotic called into [facility] in [place] to help with this matter. Pt states that her ears are hurting her as well. Please advise message.

## 2022-04-06 NOTE — TELEPHONE ENCOUNTER
----- Message from Brian Moss sent at 4/6/2022  4:06 PM CDT -----  Type:  Patient Returning Call    Who Called:Pt   Would the patient rather a call back or a response via [facility]? Call   Best Call Back Number:[phone]  Additional Information:     Pt would like an antibiotic filled her pharmacy  Pt stated she has a sore throat

## 2022-04-06 NOTE — TELEPHONE ENCOUNTER
Advised patient that strep screen was negative - no indication of bacterial infection present so no need for an antibiotic.  I sent her throat culture to labs so I have see any type of bacterial growth - I will send in an antibiotic then.  In meantime - rotate tylenol and ibuprofen every 3 hours for pain and use the magic mouthwash as well.

## 2022-04-08 ENCOUNTER — TELEPHONE (OUTPATIENT)
Dept: FAMILY MEDICINE | Facility: CLINIC | Age: 62
End: 2022-04-08
Payer: COMMERCIAL

## 2022-04-08 LAB — BACTERIA THROAT CULT: NORMAL

## 2022-04-08 NOTE — TELEPHONE ENCOUNTER
----- Message from Matilde Isaacs NP sent at 4/8/2022 12:21 PM CDT -----  Throat culture is negative - no bacterial growth.  Continue to treat throat pain as discussed at office visit.

## 2022-04-08 NOTE — TELEPHONE ENCOUNTER
Called and spoke with pt in regards of her throat culture, pt verbalized understanding of message.

## 2022-04-08 NOTE — PROGRESS NOTES
Throat culture is negative - no bacterial growth.  Continue to treat throat pain as discussed at office visit.

## 2022-05-31 ENCOUNTER — PATIENT MESSAGE (OUTPATIENT)
Dept: ADMINISTRATIVE | Facility: HOSPITAL | Age: 62
End: 2022-05-31
Payer: COMMERCIAL

## 2022-06-01 ENCOUNTER — OFFICE VISIT (OUTPATIENT)
Dept: OBSTETRICS AND GYNECOLOGY | Facility: CLINIC | Age: 62
End: 2022-06-01
Payer: COMMERCIAL

## 2022-06-01 VITALS — WEIGHT: 169 LBS | DIASTOLIC BLOOD PRESSURE: 76 MMHG | BODY MASS INDEX: 32.19 KG/M2 | SYSTOLIC BLOOD PRESSURE: 122 MMHG

## 2022-06-01 DIAGNOSIS — Z01.419 WELL WOMAN EXAM WITH ROUTINE GYNECOLOGICAL EXAM: Primary | ICD-10-CM

## 2022-06-01 DIAGNOSIS — Z12.31 ENCOUNTER FOR SCREENING MAMMOGRAM FOR BREAST CANCER: ICD-10-CM

## 2022-06-01 DIAGNOSIS — Z12.4 CERVICAL CANCER SCREENING: ICD-10-CM

## 2022-06-01 PROCEDURE — 3008F BODY MASS INDEX DOCD: CPT | Mod: CPTII,S$GLB,, | Performed by: OBSTETRICS & GYNECOLOGY

## 2022-06-01 PROCEDURE — 3078F DIAST BP <80 MM HG: CPT | Mod: CPTII,S$GLB,, | Performed by: OBSTETRICS & GYNECOLOGY

## 2022-06-01 PROCEDURE — 3074F PR MOST RECENT SYSTOLIC BLOOD PRESSURE < 130 MM HG: ICD-10-PCS | Mod: CPTII,S$GLB,, | Performed by: OBSTETRICS & GYNECOLOGY

## 2022-06-01 PROCEDURE — 3008F PR BODY MASS INDEX (BMI) DOCUMENTED: ICD-10-PCS | Mod: CPTII,S$GLB,, | Performed by: OBSTETRICS & GYNECOLOGY

## 2022-06-01 PROCEDURE — 88175 CYTOPATH C/V AUTO FLUID REDO: CPT | Performed by: OBSTETRICS & GYNECOLOGY

## 2022-06-01 PROCEDURE — 99999 PR PBB SHADOW E&M-EST. PATIENT-LVL III: ICD-10-PCS | Mod: PBBFAC,,, | Performed by: OBSTETRICS & GYNECOLOGY

## 2022-06-01 PROCEDURE — 3078F PR MOST RECENT DIASTOLIC BLOOD PRESSURE < 80 MM HG: ICD-10-PCS | Mod: CPTII,S$GLB,, | Performed by: OBSTETRICS & GYNECOLOGY

## 2022-06-01 PROCEDURE — 3074F SYST BP LT 130 MM HG: CPT | Mod: CPTII,S$GLB,, | Performed by: OBSTETRICS & GYNECOLOGY

## 2022-06-01 PROCEDURE — 1159F MED LIST DOCD IN RCRD: CPT | Mod: CPTII,S$GLB,, | Performed by: OBSTETRICS & GYNECOLOGY

## 2022-06-01 PROCEDURE — 87624 HPV HI-RISK TYP POOLED RSLT: CPT | Performed by: OBSTETRICS & GYNECOLOGY

## 2022-06-01 PROCEDURE — 1159F PR MEDICATION LIST DOCUMENTED IN MEDICAL RECORD: ICD-10-PCS | Mod: CPTII,S$GLB,, | Performed by: OBSTETRICS & GYNECOLOGY

## 2022-06-01 PROCEDURE — 99386 PR PREVENTIVE VISIT,NEW,40-64: ICD-10-PCS | Mod: S$GLB,,, | Performed by: OBSTETRICS & GYNECOLOGY

## 2022-06-01 PROCEDURE — 99999 PR PBB SHADOW E&M-EST. PATIENT-LVL III: CPT | Mod: PBBFAC,,, | Performed by: OBSTETRICS & GYNECOLOGY

## 2022-06-01 PROCEDURE — 99386 PREV VISIT NEW AGE 40-64: CPT | Mod: S$GLB,,, | Performed by: OBSTETRICS & GYNECOLOGY

## 2022-06-01 NOTE — PROGRESS NOTES
GYNECOLOGY OFFICE NOTE    Reason for visit: annual    HPI: Pt is a 61 y.o.  female  who presents for annual. Menarche: 10. Menopausal since 47. Denies issues with pelvic pain or bleeding. She is sexually active- uses lubricants. Taking black cohosh for vasomotor symptoms. She does not desire STI screening. She denies vaginal discharge.  Last pap:>8yr , denies hx of abnormal. Last MMG 10/2021- negative.     Past Medical History:   Diagnosis Date    Hyperlipidemia     Kidney stone     Liver cyst 10/21/2021    9 mm hepatic cyst - typically benign and requires no further workup.       Past Surgical History:   Procedure Laterality Date    ARTHROSCOPIC DEBRIDEMENT OF SHOULDER Right 2021    Procedure: DEBRIDEMENT LABRUM AND CALCIUM DEPOSIT, SHOULDER, ARTHROSCOPIC;  Surgeon: Thaddeus Rider MD;  Location: [facility] OR;  Service: Orthopedics;  Laterality: Right;    ARTHROSCOPIC REPAIR OF ROTATOR CUFF OF SHOULDER Right 2021    Procedure: REPAIR, ROTATOR CUFF, ARTHROSCOPIC;  Surgeon: Thaddeus Rider MD;  Location: [facility] OR;  Service: Orthopedics;  Laterality: Right;  regional w/catheter (interscalene)    COLONOSCOPY  2016    normal - repeat in 10 year - Dr. Damon Contreras = [facility]    CYSTOSCOPY W/ LASER LITHOTRIPSY      ESOPHAGOGASTRODUODENOSCOPY  2016    Dr. Contreras - Grade B reflux esophagitis and small hiatus hernia    TONSILLECTOMY         Family History   Problem Relation Age of Onset    Heart disease Mother         passed age 83 heart attack    Diabetes Mother     Diabetes Father     Heart disease Father         CABG around age 65;  heart failure at 68    No Known Problems Sister     Hypertension Brother     Hyperlipidemia Brother     Diabetes Brother     Thyroid disease Sister     Hyperlipidemia Sister     Diabetes Brother     Thyroid disease Brother     Myasthenia gravis Brother     No Known Problems Daughter     No Known Problems  Daughter        Social History     Tobacco Use    Smoking status: Never Smoker    Smokeless tobacco: Never Used   Substance Use Topics    Alcohol use: Yes     Comment: social    Drug use: No       OB History    Para Term  AB Living   2 2 2         SAB IAB Ectopic Multiple Live Births                  # Outcome Date GA Lbr Juan C/2nd Weight Sex Delivery Anes PTL Lv   2 Term            1 Term                Current Outpatient Medications   Medication Sig    atorvastatin (LIPITOR) 40 MG tablet Take 1 tablet (40 mg total) by mouth once daily.    pantoprazole (PROTONIX) 40 MG tablet Take 1 tablet (40 mg total) by mouth daily as needed (acid reflux).    diphenhydrAMINE-aluminum-magnesium hydroxide-simethicone-LIDOcaine HCl 2% Swish and swallow 15 mLs every 4 (four) hours as needed (throat pain).    ibuprofen (ADVIL,MOTRIN) 600 MG tablet Take 1 tablet (600 mg total) by mouth 3 (three) times daily.     No current facility-administered medications for this visit.       Allergies: Patient has no known allergies.     /76   Wt 76.6 kg (168 lb 15.7 oz)   LMP  (LMP Unknown)   BMI 32.19 kg/m²     ROS:  GENERAL: Denies fever or chills.   SKIN: Denies rash or lesions.   HEAD: Denies head injury or headache.   CHEST: Denies chest pain or shortness of breath.   CARDIOVASCULAR: Denies palpitations or chest pain.   ABDOMEN: No constipation, diarrhea, nausea, vomiting or rectal bleeding.   URINARY: No dysuria, hematuria, or burning on urination.  REPRODUCTIVE: See HPI.   BREASTS: see HPI  NEUROLOGIC: Denies syncope or weakness.     Physical Exam:  GENERAL: alert, appears stated age and cooperative  NEUROLOGIC: orientated to person, place and time, normal mood and affect   CHEST: Normal respiratory effort  NECK: normal appearance  SKIN: no acne, hirsutism  BREAST EXAM: breasts appear normal, no suspicious masses, no skin or nipple changes or axillary nodes  ABDOMEN: abdomen is soft without significant  tenderness, masses  EXTERNAL GENITALIA:  normal general appearance  URETHRA: normal urethra, normal urethral meatus  VAGINA:  normal mucosa, no  lesions  CERVIX:  Normal  UTERUS:  mobile, non tender  ADNEXA: nontender    Diagnosis:  1. Well woman exam with routine gynecological exam    2. Cervical cancer screening    3. Encounter for screening mammogram for breast cancer        Plan:   1. Annual  2. Pap today  3. MMG ordered for 10/2022    Orders Placed This Encounter    HPV High Risk Genotypes, PCR    Mammo Digital Screening Bilat w/ Daquan    Liquid-Based Pap Smear, Screening         Lissa Chowdhury MD  OB/GYN

## 2022-06-08 LAB
HPV HR 12 DNA SPEC QL NAA+PROBE: NEGATIVE
HPV16 AG SPEC QL: NEGATIVE
HPV18 DNA SPEC QL NAA+PROBE: NEGATIVE

## 2022-06-10 LAB
FINAL PATHOLOGIC DIAGNOSIS: NORMAL
Lab: NORMAL

## 2022-08-15 ENCOUNTER — TELEPHONE (OUTPATIENT)
Dept: OBSTETRICS AND GYNECOLOGY | Facility: CLINIC | Age: 62
End: 2022-08-15
Payer: COMMERCIAL

## 2022-08-15 NOTE — TELEPHONE ENCOUNTER
----- Message from Cristy Penn sent at 8/15/2022  1:33 PM CDT -----  Contact: [phone]  Who Called: PT  Regarding: questions regarding pap smear   Would the patient rather a call back or a response via [facility]? Call back  Best Call Back Number:[phone]   Additional Information: n/a

## 2022-08-19 ENCOUNTER — OFFICE VISIT (OUTPATIENT)
Dept: FAMILY MEDICINE | Facility: CLINIC | Age: 62
End: 2022-08-19
Payer: COMMERCIAL

## 2022-08-19 VITALS
WEIGHT: 169.19 LBS | HEART RATE: 71 BPM | SYSTOLIC BLOOD PRESSURE: 120 MMHG | OXYGEN SATURATION: 97 % | DIASTOLIC BLOOD PRESSURE: 80 MMHG | TEMPERATURE: 98 F | HEIGHT: 61 IN | BODY MASS INDEX: 31.94 KG/M2

## 2022-08-19 DIAGNOSIS — K21.9 CHRONIC GERD: ICD-10-CM

## 2022-08-19 DIAGNOSIS — Z00.00 ANNUAL PHYSICAL EXAM: Primary | ICD-10-CM

## 2022-08-19 DIAGNOSIS — E66.09 CLASS 1 OBESITY DUE TO EXCESS CALORIES WITH SERIOUS COMORBIDITY AND BODY MASS INDEX (BMI) OF 32.0 TO 32.9 IN ADULT: ICD-10-CM

## 2022-08-19 DIAGNOSIS — K76.0 HEPATIC STEATOSIS: ICD-10-CM

## 2022-08-19 DIAGNOSIS — E78.49 OTHER HYPERLIPIDEMIA: ICD-10-CM

## 2022-08-19 DIAGNOSIS — K76.89 LIVER CYST: ICD-10-CM

## 2022-08-19 DIAGNOSIS — J30.89 ENVIRONMENTAL AND SEASONAL ALLERGIES: ICD-10-CM

## 2022-08-19 PROCEDURE — 1160F PR REVIEW ALL MEDS BY PRESCRIBER/CLIN PHARMACIST DOCUMENTED: ICD-10-PCS | Mod: CPTII,S$GLB,, | Performed by: NURSE PRACTITIONER

## 2022-08-19 PROCEDURE — 3074F PR MOST RECENT SYSTOLIC BLOOD PRESSURE < 130 MM HG: ICD-10-PCS | Mod: CPTII,S$GLB,, | Performed by: NURSE PRACTITIONER

## 2022-08-19 PROCEDURE — 1159F PR MEDICATION LIST DOCUMENTED IN MEDICAL RECORD: ICD-10-PCS | Mod: CPTII,S$GLB,, | Performed by: NURSE PRACTITIONER

## 2022-08-19 PROCEDURE — 99214 PR OFFICE/OUTPT VISIT, EST, LEVL IV, 30-39 MIN: ICD-10-PCS | Mod: S$GLB,,, | Performed by: NURSE PRACTITIONER

## 2022-08-19 PROCEDURE — 3008F BODY MASS INDEX DOCD: CPT | Mod: CPTII,S$GLB,, | Performed by: NURSE PRACTITIONER

## 2022-08-19 PROCEDURE — 99214 OFFICE O/P EST MOD 30 MIN: CPT | Mod: S$GLB,,, | Performed by: NURSE PRACTITIONER

## 2022-08-19 PROCEDURE — 3079F DIAST BP 80-89 MM HG: CPT | Mod: CPTII,S$GLB,, | Performed by: NURSE PRACTITIONER

## 2022-08-19 PROCEDURE — 99999 PR PBB SHADOW E&M-EST. PATIENT-LVL III: CPT | Mod: PBBFAC,,, | Performed by: NURSE PRACTITIONER

## 2022-08-19 PROCEDURE — 3079F PR MOST RECENT DIASTOLIC BLOOD PRESSURE 80-89 MM HG: ICD-10-PCS | Mod: CPTII,S$GLB,, | Performed by: NURSE PRACTITIONER

## 2022-08-19 PROCEDURE — 3008F PR BODY MASS INDEX (BMI) DOCUMENTED: ICD-10-PCS | Mod: CPTII,S$GLB,, | Performed by: NURSE PRACTITIONER

## 2022-08-19 PROCEDURE — 1160F RVW MEDS BY RX/DR IN RCRD: CPT | Mod: CPTII,S$GLB,, | Performed by: NURSE PRACTITIONER

## 2022-08-19 PROCEDURE — 99999 PR PBB SHADOW E&M-EST. PATIENT-LVL III: ICD-10-PCS | Mod: PBBFAC,,, | Performed by: NURSE PRACTITIONER

## 2022-08-19 PROCEDURE — 3074F SYST BP LT 130 MM HG: CPT | Mod: CPTII,S$GLB,, | Performed by: NURSE PRACTITIONER

## 2022-08-19 PROCEDURE — 1159F MED LIST DOCD IN RCRD: CPT | Mod: CPTII,S$GLB,, | Performed by: NURSE PRACTITIONER

## 2022-08-19 RX ORDER — ATORVASTATIN CALCIUM 40 MG/1
40 TABLET, FILM COATED ORAL DAILY
Qty: 90 TABLET | Refills: 1 | Status: SHIPPED | OUTPATIENT
Start: 2022-08-19 | End: 2023-04-04

## 2022-08-19 NOTE — PROGRESS NOTES
Subjective:       Patient ID: Hallie Blake is a 61 y.o. female.    Chief Complaint: Annual Exam    HPI    Patient is a 61 year old white female with Hyperlipidemia, Chronic GERD with small hiatal hernia seen on EGD, chronic allergies, history of acute stress reaction, S/P Rotator Cuff Tear to right shoulder on 1/20/2021, Hepatic Steatosis and benign simple liver cyst seen on CT 10/2021, and personal history of kidney stones followed by Dr. Odonnell that is here today for ANNUAL physical exam with fasting lab results.     Hyperlipidemia   takes Atorvastatin 40 mg daily.   LDL 95.2     Chronic GERD.    She had her last EGD in July 2016 that showed grade B reflux esophagitis and a small hiatus hernia with esophagus dilated by Dr. Contreras.   She reports taking Protonix around twice weekly.       Chronic allergies.   Takes Flonase prn.     Personal history of kidney stones   followed by Dr. Odonnell.     Hepatic steatosis with simple liver cyst   seen on CT scan 10/2021.    Liver enzymes are normal and patient is working on weight loss - she is on weight watchers.    Wellness Labs:  CBC WNL  CMP okay  Cholesterol okay  TSH WNL    Health Maintenance:  Will schedule mammogram for October  Declined pneumonia and covid vaccines    Component      Latest Ref Rng & Units 8/8/2022 2/3/2022 7/14/2021   WBC      3.90 - 12.70 K/uL 6.48  7.41   RBC      4.00 - 5.40 M/uL 4.04  4.02   Hemoglobin      12.0 - 16.0 g/dL 12.6  12.6   Hematocrit      37.0 - 48.5 % 38.4  38.9   MCV      82 - 98 fL 95  97   MCH      27.0 - 31.0 pg 31.2 (H)  31.3 (H)   MCHC      32.0 - 36.0 g/dL 32.8  32.4   RDW      11.5 - 14.5 % 13.6  13.3   Platelets      150 - 450 K/uL 221  263   MPV      9.2 - 12.9 fL 9.7  9.9   Immature Granulocytes      0.0 - 0.5 % 0.2  0.3   Gran # (ANC)      1.8 - 7.7 K/uL 3.6  3.6   Immature Grans (Abs)      0.00 - 0.04 K/uL 0.01  0.02   Lymph #      1.0 - 4.8 K/uL 2.3  3.1   Mono #      0.3 - 1.0 K/uL 0.5  0.6   Eos #      0.0 - 0.5 K/uL  0.1  0.1   Baso #      0.00 - 0.20 K/uL 0.03  0.03   nRBC      0 /100 WBC 0  0   Gran %      38.0 - 73.0 % 55.1  48.1   Lymph %      18.0 - 48.0 % 34.7  42.0   Mono %      4.0 - 15.0 % 7.3  7.7   Eosinophil %      0.0 - 8.0 % 2.2  1.5   Basophil %      0.0 - 1.9 % 0.5  0.4   Differential Method       Automated  Automated   Sodium      136 - 145 mmol/L 142 146 (H) 144   Potassium      3.5 - 5.1 mmol/L 3.9 4.2 4.1   Chloride      95 - 110 mmol/L 105 104 107   CO2      23 - 29 mmol/L 25 27 28   Glucose      70 - 110 mg/dL 95 90 97   BUN      7 - 17 mg/dL 16 16 22 (H)   Creatinine      0.50 - 1.40 mg/dL 0.65 0.63 0.67   Calcium      8.7 - 10.5 mg/dL 8.9 9.5 9.7   PROTEIN TOTAL      6.0 - 8.4 g/dL 7.5 7.3 7.4   Albumin      3.5 - 5.2 g/dL 4.6 4.6 4.5   BILIRUBIN TOTAL      0.1 - 1.0 mg/dL 0.7 0.5 0.6   Alkaline Phosphatase      38 - 126 U/L 124 115 120   AST      15 - 46 U/L 29 36 31   ALT      10 - 44 U/L 22 32 25   Anion Gap      8 - 16 mmol/L 12 15 9   eGFR      >60 mL/min/1.73 m:2 >60.0     Cholesterol      120 - 199 mg/dL 177 144 156   Triglycerides      30 - 150 mg/dL 129 87 109   HDL      40 - 75 mg/dL 56 50 46   LDL Cholesterol External      63.0 - 159.0 mg/dL 95.2 76.6 88.2   HDL/Cholesterol Ratio      20.0 - 50.0 % 31.6 34.7 29.5   Total Cholesterol/HDL Ratio      2.0 - 5.0 3.2 2.9 3.4   Non-HDL Cholesterol      mg/dL 121 94 110   TSH      0.400 - 4.000 uIU/mL 2.220  2.470     Review of Systems   Constitutional: Negative for activity change and unexpected weight change.   HENT: Negative for hearing loss, rhinorrhea and trouble swallowing.    Eyes: Negative for discharge and visual disturbance.   Respiratory: Negative for chest tightness and wheezing.    Cardiovascular: Negative for chest pain and palpitations.   Gastrointestinal: Negative for blood in stool, constipation, diarrhea and vomiting.   Endocrine: Negative for polydipsia and polyuria.   Genitourinary: Negative for difficulty urinating, dysuria,  "hematuria and menstrual problem.   Musculoskeletal: Negative for arthralgias, joint swelling and neck pain.   Neurological: Negative for weakness and headaches.   Psychiatric/Behavioral: Negative for confusion and dysphoric mood.         Objective:     Vitals:    08/19/22 0733   BP: 120/80   Pulse: 71   Temp: 97.7 °F (36.5 °C)   SpO2: 97%   Weight: 76.8 kg (169 lb 3.3 oz)   Height: 5' 0.75" (1.543 m)          Physical Exam  Constitutional:       General: She is not in acute distress.     Appearance: She is well-developed. She is obese. She is not ill-appearing, toxic-appearing or diaphoretic.      Comments: + obesity. Body mass index is 32.23 kg/m².         HENT:      Head: Normocephalic and atraumatic.      Right Ear: External ear normal. Tympanic membrane is scarred and perforated.      Left Ear: External ear normal. Tympanic membrane is scarred and perforated.      Ears:     Eyes:      Pupils: Pupils are equal, round, and reactive to light.   Neck:      Thyroid: No thyromegaly.      Trachea: No tracheal deviation.   Cardiovascular:      Rate and Rhythm: Normal rate and regular rhythm.      Heart sounds: Normal heart sounds. No murmur heard.  Pulmonary:      Effort: Pulmonary effort is normal. No respiratory distress.      Breath sounds: Normal breath sounds.   Abdominal:      General: Bowel sounds are normal. There is no distension.      Palpations: Abdomen is soft. There is no mass.      Tenderness: There is no abdominal tenderness. There is no guarding or rebound.      Hernia: No hernia is present.   Musculoskeletal:      Cervical back: Normal range of motion and neck supple.   Lymphadenopathy:      Cervical: No cervical adenopathy.   Skin:     General: Skin is warm and dry.      Findings: No rash.   Neurological:      Mental Status: She is alert and oriented to person, place, and time.      Cranial Nerves: No cranial nerve deficit.      Coordination: Coordination normal.   Psychiatric:         Mood and Affect: " Mood normal.         Speech: Speech normal.         Behavior: Behavior normal. Behavior is cooperative.         Thought Content: Thought content normal. Thought content does not include suicidal ideation. Thought content does not include suicidal plan.           Assessment:         ICD-10-CM ICD-9-CM   1. Annual physical exam  Z00.00 V70.0   2. Other hyperlipidemia  E78.49 272.4   3. Class 1 obesity due to excess calories with serious comorbidity and body mass index (BMI) of 32.0 to 32.9 in adult  E66.09 278.00    Z68.32 V85.32   4. Chronic GERD  K21.9 530.81   5. Hepatic steatosis  K76.0 571.8   6. Liver cyst  K76.89 573.8   7. Environmental and seasonal allergies  J30.89 477.8       Plan:       Annual physical exam    Health Maintenance Summary     Full History      Expand All  Collapse All    Postponed - COVID-19 Vaccine  (3 - Booster for Moderna series)  Postponed until 8/19/2023 03/26/2021  Imm Admin: COVID-19, MRNA, LN-S, PF (MODERNA FULL 0.5 ML DOSE)    02/26/2021  Imm Admin: COVID-19, MRNA, LN-S, PF (MODERNA FULL 0.5 ML DOSE)      Postponed - Pneumococcal Vaccines (Age 0-64)  (1 - PCV)  Postponed until 8/19/2023  No completion history exists for this topic.     Influenza Vaccine  (1)  Next due on 9/1/2022  No completion history exists for this topic.     Ordered - Mammogram  (Yearly)  Ordered on 6/1/2022  10/25/2021  Mammo Digital Screening Bilat w/ Daquan    10/06/2020  Mammo Digital Screening Bilat w/ Daquan    08/28/2019  Mammo Digital Screening Bilat w/ Daquan    11/02/2015  Done      TETANUS VACCINE  (Every 10 Years)  Next due on 7/26/2026 07/26/2016  Imm Admin: Tdap    08/05/2002  Imm Admin: Tdap      Colorectal Cancer Screening  (Colonoscopy - Every 10 Years)  Next due on 9/16/2026 09/16/2016  Colonoscopy (Done)    07/26/2016  Colonoscopy (Declined - states she will schedule with Dr. Contreras)      Cervical Cancer Screening  (HPV/Cotest - Every 5 Years)  Next due on 6/1/2027 06/01/2022  Multiple  components of HPV High Risk Genotypes, PCR    06/01/2022  Liquid-Based Pap Smear, Screening    02/20/2019  HM PAP SMEAR    11/02/2015  Pap Smear (Done)      Ordered - Lipid Panel  (Every 5 Years)  Ordered on 8/19/2022 08/08/2022  Lipid Panel    02/03/2022  Lipid Panel    07/14/2021  Lipid Panel    12/11/2020  Lipid Panel    09/25/2020  Lipid panel    View More History     Hepatitis C Screening  Completed  07/11/2016  Hepatitis C Ab component of Hepatitis C antibody      Shingles Vaccine  (Series Information)  Completed  02/27/2020  Imm Admin: Zoster Recombinant    08/21/2019  Imm Admin: Zoster Recombinant      HIV Screening  Completed  09/25/2020  HIV 1/2 Ag/Ab (4th Gen)         Other hyperlipidemia  Continue current medication(s).  Follow up in 6 months.  -     atorvastatin (LIPITOR) 40 MG tablet; Take 1 tablet (40 mg total) by mouth once daily.  Dispense: 90 tablet; Refill: 1    Class 1 obesity due to excess calories with serious comorbidity and body mass index (BMI) of 32.0 to 32.9 in adult    Chronic GERD  Continue current medication(s).  Follow up in 6 months.    Hepatic steatosis  Working on weight loss    Liver cyst  Simple and requires no follow up    Environmental and seasonal allergies  Flonase prn      Follow up in about 6 months (around 2/19/2023) for fasting labs and follow up.     Patient's Medications   New Prescriptions    No medications on file   Previous Medications    PANTOPRAZOLE (PROTONIX) 40 MG TABLET    Take 1 tablet (40 mg total) by mouth daily as needed (acid reflux).   Modified Medications    Modified Medication Previous Medication    ATORVASTATIN (LIPITOR) 40 MG TABLET atorvastatin (LIPITOR) 40 MG tablet       Take 1 tablet (40 mg total) by mouth once daily.    Take 1 tablet (40 mg total) by mouth once daily.   Discontinued Medications    No medications on file       Past Medical History:   Diagnosis Date    Hyperlipidemia     Kidney stone     Liver cyst 10/21/2021    9 mm hepatic cyst  - typically benign and requires no further workup.       Past Surgical History:   Procedure Laterality Date    ARTHROSCOPIC DEBRIDEMENT OF SHOULDER Right 2021    Procedure: DEBRIDEMENT LABRUM AND CALCIUM DEPOSIT, SHOULDER, ARTHROSCOPIC;  Surgeon: Thaddeus Rider MD;  Location: [facility] OR;  Service: Orthopedics;  Laterality: Right;    ARTHROSCOPIC REPAIR OF ROTATOR CUFF OF SHOULDER Right 2021    Procedure: REPAIR, ROTATOR CUFF, ARTHROSCOPIC;  Surgeon: Thaddeus Rider MD;  Location: [facility] OR;  Service: Orthopedics;  Laterality: Right;  regional w/catheter (interscalene)    COLONOSCOPY  2016    normal - repeat in 10 year - Dr. Damon Contreras = [facility]    CYSTOSCOPY W/ LASER LITHOTRIPSY      ESOPHAGOGASTRODUODENOSCOPY  2016    Dr. Contreras - Grade B reflux esophagitis and small hiatus hernia    TONSILLECTOMY         Family History   Problem Relation Age of Onset    Heart disease Mother         passed age 83 heart attack    Diabetes Mother     Diabetes Father     Heart disease Father         CABG around age 65;  heart failure at 68    No Known Problems Sister     Hypertension Brother     Hyperlipidemia Brother     Diabetes Brother     Thyroid disease Sister     Hyperlipidemia Sister     Diabetes Brother     Thyroid disease Brother     Myasthenia gravis Brother     No Known Problems Daughter     No Known Problems Daughter        Social History     Socioeconomic History    Marital status:    Occupational History    Occupation:    Tobacco Use    Smoking status: Never Smoker    Smokeless tobacco: Never Used   Substance and Sexual Activity    Alcohol use: Not Currently     Comment: social    Drug use: No    Sexual activity: Yes     Partners: Male

## 2022-10-23 NOTE — TELEPHONE ENCOUNTER
----- Message from Viviane Juares sent at 8/1/2019  9:17 AM CDT -----  Contact: [phone]-self  Patient requesting lab work be placed in system. Please advise.   
Patient scheduled for lab work.  
unknown

## 2022-11-10 ENCOUNTER — TELEPHONE (OUTPATIENT)
Dept: FAMILY MEDICINE | Facility: CLINIC | Age: 62
End: 2022-11-10
Payer: COMMERCIAL

## 2022-11-10 NOTE — TELEPHONE ENCOUNTER
----- Message from Elton Reese sent at 11/10/2022 12:31 PM CST -----  .Type:  Needs Medical Advice    Who Called: Pt  Would the patient rather a call back or a response via [facility]? Call  Best Call Back Number: [phone]  Additional Information:   Pt has no idea what appt is for on the 11/15.

## 2022-11-10 NOTE — TELEPHONE ENCOUNTER
Call patient in regards of message- patient said see to have a Ultra sound on Tuesday and wanted to know what's for- I advised patient look like her U/S was schedule by her Urology- patient said she do not feel she need to have it done and is going to cancel her appt- I advised patient to reach out to Dr Odonnell office to let her know that you are canceling your ultrasound.

## 2023-04-13 ENCOUNTER — OFFICE VISIT (OUTPATIENT)
Dept: FAMILY MEDICINE | Facility: CLINIC | Age: 63
End: 2023-04-13
Payer: COMMERCIAL

## 2023-04-13 VITALS
TEMPERATURE: 98 F | DIASTOLIC BLOOD PRESSURE: 82 MMHG | WEIGHT: 178.38 LBS | HEIGHT: 61 IN | HEART RATE: 76 BPM | BODY MASS INDEX: 33.68 KG/M2 | SYSTOLIC BLOOD PRESSURE: 134 MMHG | OXYGEN SATURATION: 97 %

## 2023-04-13 DIAGNOSIS — Z87.442 PERSONAL HISTORY OF KIDNEY STONES: ICD-10-CM

## 2023-04-13 DIAGNOSIS — E66.09 CLASS 1 OBESITY DUE TO EXCESS CALORIES WITH SERIOUS COMORBIDITY AND BODY MASS INDEX (BMI) OF 33.0 TO 33.9 IN ADULT: ICD-10-CM

## 2023-04-13 DIAGNOSIS — E78.49 OTHER HYPERLIPIDEMIA: Primary | ICD-10-CM

## 2023-04-13 DIAGNOSIS — Z13.1 DIABETES MELLITUS SCREENING: ICD-10-CM

## 2023-04-13 DIAGNOSIS — K21.9 CHRONIC GERD: ICD-10-CM

## 2023-04-13 DIAGNOSIS — Z13.0 SCREENING FOR DEFICIENCY ANEMIA: ICD-10-CM

## 2023-04-13 DIAGNOSIS — J30.89 ENVIRONMENTAL AND SEASONAL ALLERGIES: ICD-10-CM

## 2023-04-13 DIAGNOSIS — K76.0 HEPATIC STEATOSIS: ICD-10-CM

## 2023-04-13 DIAGNOSIS — Z13.29 THYROID DISORDER SCREEN: ICD-10-CM

## 2023-04-13 DIAGNOSIS — Z00.00 ENCOUNTER FOR BLOOD TEST FOR ROUTINE GENERAL PHYSICAL EXAMINATION: ICD-10-CM

## 2023-04-13 PROCEDURE — 3008F BODY MASS INDEX DOCD: CPT | Mod: CPTII,S$GLB,, | Performed by: NURSE PRACTITIONER

## 2023-04-13 PROCEDURE — 3079F DIAST BP 80-89 MM HG: CPT | Mod: CPTII,S$GLB,, | Performed by: NURSE PRACTITIONER

## 2023-04-13 PROCEDURE — 1160F PR REVIEW ALL MEDS BY PRESCRIBER/CLIN PHARMACIST DOCUMENTED: ICD-10-PCS | Mod: CPTII,S$GLB,, | Performed by: NURSE PRACTITIONER

## 2023-04-13 PROCEDURE — 1160F RVW MEDS BY RX/DR IN RCRD: CPT | Mod: CPTII,S$GLB,, | Performed by: NURSE PRACTITIONER

## 2023-04-13 PROCEDURE — 3075F PR MOST RECENT SYSTOLIC BLOOD PRESS GE 130-139MM HG: ICD-10-PCS | Mod: CPTII,S$GLB,, | Performed by: NURSE PRACTITIONER

## 2023-04-13 PROCEDURE — 1159F PR MEDICATION LIST DOCUMENTED IN MEDICAL RECORD: ICD-10-PCS | Mod: CPTII,S$GLB,, | Performed by: NURSE PRACTITIONER

## 2023-04-13 PROCEDURE — 99999 PR PBB SHADOW E&M-EST. PATIENT-LVL IV: ICD-10-PCS | Mod: PBBFAC,,, | Performed by: NURSE PRACTITIONER

## 2023-04-13 PROCEDURE — 1159F MED LIST DOCD IN RCRD: CPT | Mod: CPTII,S$GLB,, | Performed by: NURSE PRACTITIONER

## 2023-04-13 PROCEDURE — 99999 PR PBB SHADOW E&M-EST. PATIENT-LVL IV: CPT | Mod: PBBFAC,,, | Performed by: NURSE PRACTITIONER

## 2023-04-13 PROCEDURE — 99214 PR OFFICE/OUTPT VISIT, EST, LEVL IV, 30-39 MIN: ICD-10-PCS | Mod: S$GLB,,, | Performed by: NURSE PRACTITIONER

## 2023-04-13 PROCEDURE — 3008F PR BODY MASS INDEX (BMI) DOCUMENTED: ICD-10-PCS | Mod: CPTII,S$GLB,, | Performed by: NURSE PRACTITIONER

## 2023-04-13 PROCEDURE — 99214 OFFICE O/P EST MOD 30 MIN: CPT | Mod: S$GLB,,, | Performed by: NURSE PRACTITIONER

## 2023-04-13 PROCEDURE — 3079F PR MOST RECENT DIASTOLIC BLOOD PRESSURE 80-89 MM HG: ICD-10-PCS | Mod: CPTII,S$GLB,, | Performed by: NURSE PRACTITIONER

## 2023-04-13 PROCEDURE — 3075F SYST BP GE 130 - 139MM HG: CPT | Mod: CPTII,S$GLB,, | Performed by: NURSE PRACTITIONER

## 2023-04-13 NOTE — PROGRESS NOTES
Subjective:       Patient ID: Hallie Blake is a 62 y.o. female.    Chief Complaint: Follow-up (6 months F/U)    HPI    Patient is a 62 year old white female with Hyperlipidemia, Chronic GERD with small hiatal hernia seen on EGD, chronic allergies, history of acute stress reaction, S/P Rotator Cuff Tear to right shoulder on 1/20/2021, Hepatic Steatosis and benign simple liver cyst seen on CT 10/2021, and personal history of kidney stones followed by Dr. Odonnell that is here today for 6 month follow up with fasting lab results.     Hyperlipidemia   takes Atorvastatin 40 mg daily.   LDL 83.4     Chronic GERD.    She had her last EGD in July 2016 that showed grade B reflux esophagitis and a small hiatus hernia with esophagus dilated by Dr. Contreras.   She reports taking Protonix around twice weekly.       Chronic allergies.   Takes Flonase prn.     Personal history of kidney stones   followed by Dr. Odonnell.     Hepatic steatosis with simple liver cyst   seen on CT scan 10/2021.    Liver enzymes are normal and patient is working on weight loss - she is on weight watchers.    Obesity  Body mass index is 33.98 kg/m².    Component      Latest Ref Rng & Units 4/10/2023 8/8/2022 2/3/2022   Sodium      136 - 145 mmol/L 141 142 146 (H)   Potassium      3.5 - 5.1 mmol/L 3.9 3.9 4.2   Chloride      95 - 110 mmol/L 109 105 104   CO2      23 - 29 mmol/L 27 25 27   Glucose      70 - 110 mg/dL 99 95 90   BUN      7 - 17 mg/dL 13 16 16   Creatinine      0.50 - 1.40 mg/dL 0.53 0.65 0.63   Calcium      8.7 - 10.5 mg/dL 9.1 8.9 9.5   PROTEIN TOTAL      6.0 - 8.4 g/dL 7.1 7.5 7.3   Albumin      3.5 - 5.2 g/dL 4.2 4.6 4.6   BILIRUBIN TOTAL      0.1 - 1.0 mg/dL 0.4 0.7 0.5   Alkaline Phosphatase      38 - 126 U/L 129 (H) 124 115   AST      15 - 46 U/L 26 29 36   ALT      10 - 44 U/L 25 22 32   Anion Gap      8 - 16 mmol/L 5 (L) 12 15   eGFR      >60 mL/min/1.73 m:2 >60.0 >60.0    Cholesterol      120 - 199 mg/dL 152 177 144   Triglycerides       "30 - 150 mg/dL 83 129 87   HDL      40 - 75 mg/dL 52 56 50   LDL Cholesterol External      63.0 - 159.0 mg/dL 83.4 95.2 76.6   HDL/Cholesterol Ratio      20.0 - 50.0 % 34.2 31.6 34.7   Total Cholesterol/HDL Ratio      2.0 - 5.0 2.9 3.2 2.9   Non-HDL Cholesterol      mg/dL 100 121 94        Review of Systems   Constitutional:  Negative for appetite change, chills, fatigue, fever and unexpected weight change.   HENT:  Negative for congestion, ear pain, mouth sores, nosebleeds, postnasal drip, rhinorrhea, sinus pressure, sneezing, sore throat, trouble swallowing and voice change.    Eyes:  Negative for photophobia, pain, discharge, redness, itching and visual disturbance.   Respiratory:  Negative for cough, chest tightness and shortness of breath.    Cardiovascular:  Negative for chest pain, palpitations and leg swelling.   Gastrointestinal:  Negative for abdominal pain, blood in stool, constipation, diarrhea, nausea and vomiting.   Genitourinary:  Negative for dysuria, frequency, hematuria and urgency.   Musculoskeletal:  Negative for arthralgias, back pain, joint swelling and myalgias.   Skin:  Negative for color change and rash.   Allergic/Immunologic: Negative for immunocompromised state.   Neurological:  Negative for dizziness, seizures, syncope, weakness and headaches.   Hematological:  Negative for adenopathy. Does not bruise/bleed easily.   Psychiatric/Behavioral:  Negative for agitation, dysphoric mood, sleep disturbance and suicidal ideas. The patient is not nervous/anxious.        Objective:     Vitals:    04/13/23 1035   BP: 134/82   BP Location: Left arm   Patient Position: Sitting   BP Method: Large (Manual)   Pulse: 76   Temp: 98.2 °F (36.8 °C)   TempSrc: Temporal   SpO2: 97%   Weight: 80.9 kg (178 lb 5.6 oz)   Height: 5' 0.75" (1.543 m)          Physical Exam  Constitutional:       General: She is not in acute distress.     Appearance: Normal appearance. She is obese. She is not toxic-appearing or " diaphoretic.      Comments: Body mass index is 33.98 kg/m².     Eyes:      Extraocular Movements: Extraocular movements intact.      Conjunctiva/sclera: Conjunctivae normal.   Cardiovascular:      Rate and Rhythm: Normal rate and regular rhythm.   Pulmonary:      Effort: Pulmonary effort is normal. No respiratory distress.      Breath sounds: Normal breath sounds.   Abdominal:      General: There is no distension.   Musculoskeletal:         General: Normal range of motion.      Right lower leg: No edema.      Left lower leg: No edema.   Skin:     Findings: No rash.   Neurological:      Mental Status: She is alert and oriented to person, place, and time.   Psychiatric:         Mood and Affect: Mood normal.         Behavior: Behavior normal.         Thought Content: Thought content normal.         Judgment: Judgment normal.         Assessment:         ICD-10-CM ICD-9-CM   1. Other hyperlipidemia  E78.49 272.4   2. Class 1 obesity due to excess calories with serious comorbidity and body mass index (BMI) of 33.0 to 33.9 in adult  E66.09 278.00    Z68.33 V85.33   3. Environmental and seasonal allergies  J30.89 477.8   4. Hepatic steatosis  K76.0 571.8   5. Chronic GERD  K21.9 530.81   6. Personal history of kidney stones  Z87.442 V13.01   7. Encounter for blood test for routine general physical examination  Z00.00 V72.62   8. Screening for deficiency anemia  Z13.0 V78.1   9. Thyroid disorder screen  Z13.29 V77.0   10. Diabetes mellitus screening  Z13.1 V77.1       Plan:       Other hyperlipidemia  Continue current medication(s).  Follow up in 6 months.  -     Lipid Panel; Future; Expected date: 04/13/2023    Class 1 obesity due to excess calories with serious comorbidity and body mass index (BMI) of 33.0 to 33.9 in adult  Work on dietary modifications    Environmental and seasonal allergies    Hepatic steatosis  Work on weight loss    Chronic GERD  PPI prn    Personal history of kidney stones  No recent  problems.    Encounter for blood test for routine general physical examination  -     CBC Auto Differential; Future; Expected date: 04/13/2023  -     Comprehensive Metabolic Panel; Future; Expected date: 04/13/2023  -     Hemoglobin A1C; Future; Expected date: 04/13/2023  -     Lipid Panel; Future; Expected date: 04/13/2023  -     TSH; Future; Expected date: 04/13/2023    Screening for deficiency anemia  -     CBC Auto Differential; Future; Expected date: 04/13/2023    Thyroid disorder screen  -     TSH; Future; Expected date: 04/13/2023    Diabetes mellitus screening  -     Comprehensive Metabolic Panel; Future; Expected date: 04/13/2023  -     Hemoglobin A1C; Future; Expected date: 04/13/2023      Follow up in about 6 months (around 10/13/2023) for fasting labs and wellness exam.     Patient's Medications   New Prescriptions    No medications on file   Previous Medications    ATORVASTATIN (LIPITOR) 40 MG TABLET    TAKE 1 TABLET BY MOUTH EVERY DAY    PANTOPRAZOLE (PROTONIX) 40 MG TABLET    TAKE 1 TABLET BY MOUTH EVERY DAY   Modified Medications    No medications on file   Discontinued Medications    No medications on file       Past Medical History:   Diagnosis Date    Hyperlipidemia     Kidney stone     Liver cyst 10/21/2021    9 mm hepatic cyst - typically benign and requires no further workup.       Past Surgical History:   Procedure Laterality Date    ARTHROSCOPIC DEBRIDEMENT OF SHOULDER Right 1/20/2021    Procedure: DEBRIDEMENT LABRUM AND CALCIUM DEPOSIT, SHOULDER, ARTHROSCOPIC;  Surgeon: Thaddeus Rider MD;  Location: [facility] OR;  Service: Orthopedics;  Laterality: Right;    ARTHROSCOPIC REPAIR OF ROTATOR CUFF OF SHOULDER Right 1/20/2021    Procedure: REPAIR, ROTATOR CUFF, ARTHROSCOPIC;  Surgeon: Thaddeus Rider MD;  Location: [facility] OR;  Service: Orthopedics;  Laterality: Right;  regional w/catheter (interscalene)    COLONOSCOPY  09/16/2016    normal - repeat in 10 year - Dr. Damon Contreras = [facility]    CYSTOSCOPY W/ LASER LITHOTRIPSY      ESOPHAGOGASTRODUODENOSCOPY  2016    Dr. Contreras - Grade B reflux esophagitis and small hiatus hernia    TONSILLECTOMY         Family History   Problem Relation Age of Onset    Heart disease Mother         passed age 83 heart attack    Diabetes Mother     Diabetes Father     Heart disease Father         CABG around age 65;  heart failure at 68    No Known Problems Sister     Hypertension Brother     Hyperlipidemia Brother     Diabetes Brother     Thyroid disease Sister     Hyperlipidemia Sister     Diabetes Brother     Thyroid disease Brother     Myasthenia gravis Brother     No Known Problems Daughter     No Known Problems Daughter        Social History     Socioeconomic History    Marital status:    Occupational History    Occupation:    Tobacco Use    Smoking status: Never     Passive exposure: Current    Smokeless tobacco: Never   Substance and Sexual Activity    Alcohol use: Not Currently     Comment: social    Drug use: No    Sexual activity: Yes     Partners: Male

## 2023-10-08 DIAGNOSIS — E78.49 OTHER HYPERLIPIDEMIA: ICD-10-CM

## 2023-10-09 RX ORDER — ATORVASTATIN CALCIUM 40 MG/1
TABLET, FILM COATED ORAL
Qty: 90 TABLET | Refills: 1 | Status: SHIPPED | OUTPATIENT
Start: 2023-10-09

## 2023-10-17 ENCOUNTER — OFFICE VISIT (OUTPATIENT)
Dept: FAMILY MEDICINE | Facility: CLINIC | Age: 63
End: 2023-10-17
Payer: COMMERCIAL

## 2023-10-17 VITALS
HEIGHT: 59 IN | OXYGEN SATURATION: 97 % | WEIGHT: 174.63 LBS | DIASTOLIC BLOOD PRESSURE: 80 MMHG | HEART RATE: 63 BPM | TEMPERATURE: 98 F | BODY MASS INDEX: 35.2 KG/M2 | SYSTOLIC BLOOD PRESSURE: 120 MMHG

## 2023-10-17 DIAGNOSIS — J30.89 ENVIRONMENTAL AND SEASONAL ALLERGIES: ICD-10-CM

## 2023-10-17 DIAGNOSIS — K76.89 LIVER CYST: ICD-10-CM

## 2023-10-17 DIAGNOSIS — Z00.00 ANNUAL PHYSICAL EXAM: Primary | ICD-10-CM

## 2023-10-17 DIAGNOSIS — E66.01 CLASS 2 SEVERE OBESITY DUE TO EXCESS CALORIES WITH SERIOUS COMORBIDITY AND BODY MASS INDEX (BMI) OF 35.0 TO 35.9 IN ADULT: ICD-10-CM

## 2023-10-17 DIAGNOSIS — K76.0 HEPATIC STEATOSIS: ICD-10-CM

## 2023-10-17 DIAGNOSIS — E78.49 OTHER HYPERLIPIDEMIA: ICD-10-CM

## 2023-10-17 DIAGNOSIS — K21.9 CHRONIC GERD: ICD-10-CM

## 2023-10-17 DIAGNOSIS — Z12.31 ENCOUNTER FOR SCREENING MAMMOGRAM FOR MALIGNANT NEOPLASM OF BREAST: ICD-10-CM

## 2023-10-17 PROBLEM — E66.812 CLASS 2 SEVERE OBESITY DUE TO EXCESS CALORIES WITH SERIOUS COMORBIDITY AND BODY MASS INDEX (BMI) OF 35.0 TO 35.9 IN ADULT: Status: ACTIVE | Noted: 2021-08-04

## 2023-10-17 PROCEDURE — 1159F MED LIST DOCD IN RCRD: CPT | Mod: CPTII,S$GLB,, | Performed by: NURSE PRACTITIONER

## 2023-10-17 PROCEDURE — 3044F PR MOST RECENT HEMOGLOBIN A1C LEVEL <7.0%: ICD-10-PCS | Mod: CPTII,S$GLB,, | Performed by: NURSE PRACTITIONER

## 2023-10-17 PROCEDURE — 99396 PR PREVENTIVE VISIT,EST,40-64: ICD-10-PCS | Mod: S$GLB,,, | Performed by: NURSE PRACTITIONER

## 2023-10-17 PROCEDURE — 3008F BODY MASS INDEX DOCD: CPT | Mod: CPTII,S$GLB,, | Performed by: NURSE PRACTITIONER

## 2023-10-17 PROCEDURE — 3079F DIAST BP 80-89 MM HG: CPT | Mod: CPTII,S$GLB,, | Performed by: NURSE PRACTITIONER

## 2023-10-17 PROCEDURE — 99396 PREV VISIT EST AGE 40-64: CPT | Mod: S$GLB,,, | Performed by: NURSE PRACTITIONER

## 2023-10-17 PROCEDURE — 99999 PR PBB SHADOW E&M-EST. PATIENT-LVL IV: CPT | Mod: PBBFAC,,, | Performed by: NURSE PRACTITIONER

## 2023-10-17 PROCEDURE — 3008F PR BODY MASS INDEX (BMI) DOCUMENTED: ICD-10-PCS | Mod: CPTII,S$GLB,, | Performed by: NURSE PRACTITIONER

## 2023-10-17 PROCEDURE — 3044F HG A1C LEVEL LT 7.0%: CPT | Mod: CPTII,S$GLB,, | Performed by: NURSE PRACTITIONER

## 2023-10-17 PROCEDURE — 1160F RVW MEDS BY RX/DR IN RCRD: CPT | Mod: CPTII,S$GLB,, | Performed by: NURSE PRACTITIONER

## 2023-10-17 PROCEDURE — 3074F PR MOST RECENT SYSTOLIC BLOOD PRESSURE < 130 MM HG: ICD-10-PCS | Mod: CPTII,S$GLB,, | Performed by: NURSE PRACTITIONER

## 2023-10-17 PROCEDURE — 1160F PR REVIEW ALL MEDS BY PRESCRIBER/CLIN PHARMACIST DOCUMENTED: ICD-10-PCS | Mod: CPTII,S$GLB,, | Performed by: NURSE PRACTITIONER

## 2023-10-17 PROCEDURE — 99999 PR PBB SHADOW E&M-EST. PATIENT-LVL IV: ICD-10-PCS | Mod: PBBFAC,,, | Performed by: NURSE PRACTITIONER

## 2023-10-17 PROCEDURE — 1159F PR MEDICATION LIST DOCUMENTED IN MEDICAL RECORD: ICD-10-PCS | Mod: CPTII,S$GLB,, | Performed by: NURSE PRACTITIONER

## 2023-10-17 PROCEDURE — 3074F SYST BP LT 130 MM HG: CPT | Mod: CPTII,S$GLB,, | Performed by: NURSE PRACTITIONER

## 2023-10-17 PROCEDURE — 3079F PR MOST RECENT DIASTOLIC BLOOD PRESSURE 80-89 MM HG: ICD-10-PCS | Mod: CPTII,S$GLB,, | Performed by: NURSE PRACTITIONER

## 2023-10-17 NOTE — PROGRESS NOTES
Subjective:       Patient ID: Hallie Blake is a 62 y.o. female.    Chief Complaint: Annual Exam    HPI    Patient is a 62 year old white female with Hyperlipidemia, Chronic GERD with small hiatal hernia seen on EGD, chronic allergies, history of acute stress reaction, S/P Rotator Cuff Tear to right shoulder on 1/20/2021, Hepatic Steatosis and benign simple liver cyst seen on CT 10/2021, and personal history of kidney stones followed by Dr. Odonnell that is here today for ANNUAL PHYSICAL EXAM with fasting lab results.     Hyperlipidemia   takes Atorvastatin 40 mg daily.   LDL 74     Chronic GERD.    She had her last EGD in July 2016 that showed grade B reflux esophagitis and a small hiatus hernia with esophagus dilated by Dr. Contreras.   She reports taking Protonix around twice weekly.       Chronic allergies.   Takes Flonase prn.     Personal history of kidney stones   followed by Dr. Odonnell.     Hepatic steatosis with simple liver cyst   seen on CT scan 10/2021.    Liver enzymes are normal and patient is working on weight loss - she is on weight watchers.     Obesity  Body mass index is 35.27 kg/m².    Wellness labs:  CBC okay  CMP okay  Cholesterol controlled on medication  TSH WNL    Health Maintenance:  Mammogram ordered  Declined pneumonia and flu vaccines    Component      Latest Ref Rng 8/8/2022 4/10/2023 10/12/2023   WBC      3.90 - 12.70 K/uL 6.48   6.39    RBC      4.00 - 5.40 M/uL 4.04   4.13    Hemoglobin      12.0 - 16.0 g/dL 12.6   13.0    Hematocrit      37.0 - 48.5 % 38.4   39.2    MCV      82 - 98 fL 95   95    MCH      27.0 - 31.0 pg 31.2 (H)   31.5 (H)    MCHC      32.0 - 36.0 g/dL 32.8   33.2    RDW      11.5 - 14.5 % 13.6   13.7    Platelet Count      150 - 450 K/uL 221   241    MPV      9.2 - 12.9 fL 9.7   9.6    Gran # (ANC)      1.8 - 7.7 K/uL 3.6   3.6    Lymph #      1.0 - 4.8 K/uL 2.3   2.1    Mono #      0.3 - 1.0 K/uL 0.5   0.5    Eos #      0.0 - 0.5 K/uL 0.1   0.2    Baso #      0.00 - 0.20  K/uL 0.03   0.03    Gran %      38.0 - 73.0 % 55.1   56.2    Lymph %      18.0 - 48.0 % 34.7   32.4    Mono %      4.0 - 15.0 % 7.3   8.0    Eosinophil %      0.0 - 8.0 % 2.2   2.7    Basophil %      0.0 - 1.9 % 0.5   0.5    Differential Method Automated   Automated    Sodium      136 - 145 mmol/L 142  141  140    Potassium      3.5 - 5.1 mmol/L 3.9  3.9  3.7    Chloride      95 - 110 mmol/L 105  109  105    CO2      23 - 29 mmol/L 25  27  27    Glucose      70 - 110 mg/dL 95  99  92    BUN      7 - 17 mg/dL 16  13  15    Creatinine      0.50 - 1.40 mg/dL 0.65  0.53  0.66    Calcium      8.7 - 10.5 mg/dL 8.9  9.1  9.0    PROTEIN TOTAL      6.0 - 8.4 g/dL 7.5  7.1  7.4    Albumin      3.5 - 5.2 g/dL 4.6  4.2  4.1    BILIRUBIN TOTAL      0.1 - 1.0 mg/dL 0.7  0.4  0.6    ALP      38 - 126 U/L 124  129 (H)  123    AST      15 - 46 U/L 29  26  29    ALT      10 - 44 U/L 22  25  25    Anion Gap      8 - 16 mmol/L 12  5 (L)  8    TSH      0.400 - 4.000 uIU/mL 2.220   1.370    Cholesterol Total      120 - 199 mg/dL 177  152  141    Triglycerides      30 - 150 mg/dL 129  83  95    HDL      40 - 75 mg/dL 56  52  48    LDL Cholesterol External      63.0 - 159.0 mg/dL 95.2  83.4  74.0    HDL/Cholesterol Ratio      20.0 - 50.0 % 31.6  34.2  34.0    Total Cholesterol/HDL Ratio      2.0 - 5.0  3.2  2.9  2.9    Non-HDL Cholesterol      mg/dL 121  100  93    Immature Granulocytes      0.0 - 0.5 % 0.2   0.2    Immature Grans (Abs)      0.00 - 0.04 K/uL 0.01   0.01    nRBC      0 /100 WBC 0   0    eGFR      >60 mL/min/1.73 m^2 >60.0  >60.0  >60.0    Hemoglobin A1C External      4.0 - 5.6 %   5.5    Estimated Avg Glucose      68 - 131 mg/dL   111       Legend:  (H) High  (L) Low     Review of Systems   All other systems reviewed and are negative.        Objective:     Vitals:    10/17/23 0737   BP: 120/80   BP Location: Right arm   Patient Position: Sitting   BP Method: Large (Manual)   Pulse: 63   Temp: 97.7 °F (36.5 °C)   TempSrc:  "Temporal   SpO2: 97%   Weight: 79.2 kg (174 lb 9.7 oz)   Height: 4' 11" (1.499 m)          Physical Exam  Constitutional:       General: She is not in acute distress.     Appearance: She is well-developed. She is obese. She is not ill-appearing, toxic-appearing or diaphoretic.      Comments: + obesity. Body mass index is 35.27 kg/m².           HENT:      Head: Normocephalic and atraumatic.      Right Ear: External ear normal. Tympanic membrane is scarred and perforated.      Left Ear: External ear normal. Tympanic membrane is scarred and perforated.      Ears:     Eyes:      Pupils: Pupils are equal, round, and reactive to light.   Neck:      Thyroid: No thyromegaly.      Trachea: No tracheal deviation.   Cardiovascular:      Rate and Rhythm: Normal rate and regular rhythm.      Heart sounds: Normal heart sounds. No murmur heard.  Pulmonary:      Effort: Pulmonary effort is normal. No respiratory distress.      Breath sounds: Normal breath sounds.   Abdominal:      General: Bowel sounds are normal. There is no distension.      Palpations: Abdomen is soft. There is no mass.      Tenderness: There is no abdominal tenderness. There is no guarding or rebound.      Hernia: No hernia is present.   Musculoskeletal:      Cervical back: Normal range of motion and neck supple.   Lymphadenopathy:      Cervical: No cervical adenopathy.   Skin:     General: Skin is warm and dry.      Findings: No rash.   Neurological:      Mental Status: She is alert and oriented to person, place, and time.      Cranial Nerves: No cranial nerve deficit.      Coordination: Coordination normal.   Psychiatric:         Mood and Affect: Mood normal.         Speech: Speech normal.         Behavior: Behavior normal. Behavior is cooperative.         Thought Content: Thought content normal. Thought content does not include suicidal ideation. Thought content does not include suicidal plan.           Assessment:         ICD-10-CM ICD-9-CM   1. Annual " physical exam  Z00.00 V70.0   2. Other hyperlipidemia  E78.49 272.4   3. Class 2 severe obesity due to excess calories with serious comorbidity and body mass index (BMI) of 35.0 to 35.9 in adult  E66.01 278.01    Z68.35 V85.35   4. Hepatic steatosis  K76.0 571.8   5. Liver cyst  K76.89 573.8   6. Chronic GERD  K21.9 530.81   7. Environmental and seasonal allergies  J30.89 477.8   8. Encounter for screening mammogram for malignant neoplasm of breast  Z12.31 V76.12       Plan:       Annual physical exam  Health Maintenance Summary     Full History      Expand All  Collapse All    Scheduled - Mammogram  (Yearly)  Scheduled for 11/17/2023  10/27/2022  Mammo Digital Screening Bilat w/ Daquan   10/25/2021  Mammo Digital Screening Bilat w/ Daquan   10/06/2020  Mammo Digital Screening Bilat w/ Daqaun   08/28/2019  Mammo Digital Screening Bilat w/ Daquan   11/02/2015  Done     Postponed - COVID-19 Vaccine  (3 - 2023-24 season)  Postponed until 10/17/2024  03/26/2021  Imm Admin: COVID-19, MRNA, LN-S, PF (MODERNA FULL 0.5 ML DOSE)   02/26/2021  Imm Admin: COVID-19, MRNA, LN-S, PF (MODERNA FULL 0.5 ML DOSE)     Postponed - Pneumococcal Vaccines (Age 0-64)  (1 - PCV)  Postponed until 10/17/2024  No completion history exists for this topic.     TETANUS VACCINE  (Every 10 Years)  Next due on 7/26/2026 07/26/2016  Imm Admin: Tdap   08/05/2002  Imm Admin: Tdap     Colorectal Cancer Screening  (Colonoscopy - Every 10 Years)  Next due on 9/16/2026 09/16/2016  Colonoscopy (Done)   07/26/2016  Colonoscopy (Declined - states she will schedule with Dr. Contreras)     Hemoglobin A1c (Diabetic Prevention Screening)  (Every 3 Years)  Next due on 10/12/2026  10/12/2023  Hemoglobin A1C External component of Hemoglobin A1C     Cervical Cancer Screening  (HPV/Cotest - Every 5 Years)  Next due on 6/1/2027 06/01/2022  Multiple components of HPV High Risk Genotypes, PCR   06/01/2022  Liquid-Based Pap Smear, Screening   02/20/2019   PAP SMEAR    11/02/2015  Pap Smear (Done)     Scheduled - Lipid Panel  (Every 5 Years)  Scheduled for 4/16/2024  10/12/2023  Cholesterol Total component of Lipid Panel   04/10/2023  Cholesterol Total component of Lipid Panel   08/08/2022  Cholesterol Total component of Lipid Panel   02/03/2022  Cholesterol Total component of Lipid Panel   07/14/2021  Cholesterol Total component of Lipid Panel   View More History     Hepatitis C Screening  Completed  07/11/2016  Hepatitis C Ab component of Hepatitis C antibody     Shingles Vaccine  (Series Information)  Completed  02/27/2020  Imm Admin: Zoster Recombinant   08/21/2019  Imm Admin: Zoster Recombinant     HIV Screening  Completed  09/25/2020  HIV 1/2 Ag/Ab (4th Gen)     Influenza Vaccine  (Series Information)  Addressed  10/17/2023  Declined         Other hyperlipidemia  Stable on current med  Recheck in 6 months.  -     Comprehensive Metabolic Panel; Future; Expected date: 10/17/2023  -     Lipid Panel; Future; Expected date: 10/17/2023    Class 2 severe obesity due to excess calories with serious comorbidity and body mass index (BMI) of 35.0 to 35.9 in adult  Working on lifestyle modifications.    Hepatic steatosis  Liver enzymes stable.  Working on lifestyle modifications.    Liver cyst  Stable.    Chronic GERD  PPI prn - twice weekly    Environmental and seasonal allergies  Flonase prn    Encounter for screening mammogram for malignant neoplasm of breast  -     Mammo Digital Screening Bilat w/ Daquan; Future; Expected date: 10/17/2023      Follow up in about 6 months (around 4/17/2024) for fasting labs and follow up.     Patient's Medications   New Prescriptions    No medications on file   Previous Medications    ATORVASTATIN (LIPITOR) 40 MG TABLET    TAKE 1 TABLET BY MOUTH EVERY DAY    PANTOPRAZOLE (PROTONIX) 40 MG TABLET    TAKE 1 TABLET BY MOUTH EVERY DAY   Modified Medications    No medications on file   Discontinued Medications    No medications on file       Past Medical  History:   Diagnosis Date    Hyperlipidemia     Kidney stone     Liver cyst 10/21/2021    9 mm hepatic cyst - typically benign and requires no further workup.       Past Surgical History:   Procedure Laterality Date    ARTHROSCOPIC DEBRIDEMENT OF SHOULDER Right 2021    Procedure: DEBRIDEMENT LABRUM AND CALCIUM DEPOSIT, SHOULDER, ARTHROSCOPIC;  Surgeon: Thaddeus Rider MD;  Location: [facility] OR;  Service: Orthopedics;  Laterality: Right;    ARTHROSCOPIC REPAIR OF ROTATOR CUFF OF SHOULDER Right 2021    Procedure: REPAIR, ROTATOR CUFF, ARTHROSCOPIC;  Surgeon: Thaddeus Rider MD;  Location: [facility] OR;  Service: Orthopedics;  Laterality: Right;  regional w/catheter (interscalene)    COLONOSCOPY  2016    normal - repeat in 10 year - Dr. Damon Contreras = [facility]    CYSTOSCOPY W/ LASER LITHOTRIPSY      ESOPHAGOGASTRODUODENOSCOPY  2016    Dr. Contreras - Grade B reflux esophagitis and small hiatus hernia    TONSILLECTOMY         Family History   Problem Relation Age of Onset    Heart disease Mother         passed age 83 heart attack    Diabetes Mother     Diabetes Father     Heart disease Father         CABG around age 65;  heart failure at 68    No Known Problems Sister     Hypertension Brother     Hyperlipidemia Brother     Diabetes Brother     Thyroid disease Sister     Hyperlipidemia Sister     Diabetes Brother     Thyroid disease Brother     Myasthenia gravis Brother     No Known Problems Daughter     No Known Problems Daughter        Social History     Socioeconomic History    Marital status:    Occupational History    Occupation:    Tobacco Use    Smoking status: Never     Passive exposure: Current    Smokeless tobacco: Never   Substance and Sexual Activity    Alcohol use: Not Currently     Comment: social    Drug use: No    Sexual activity: Yes     Partners: Male

## 2023-12-10 DIAGNOSIS — K21.9 CHRONIC GERD: ICD-10-CM

## 2023-12-12 RX ORDER — PANTOPRAZOLE SODIUM 40 MG/1
40 TABLET, DELAYED RELEASE ORAL DAILY
Qty: 90 TABLET | Refills: 0 | Status: SHIPPED | OUTPATIENT
Start: 2023-12-12

## 2024-02-05 ENCOUNTER — OFFICE VISIT (OUTPATIENT)
Dept: FAMILY MEDICINE | Facility: CLINIC | Age: 64
End: 2024-02-05
Payer: COMMERCIAL

## 2024-02-05 ENCOUNTER — TELEPHONE (OUTPATIENT)
Dept: FAMILY MEDICINE | Facility: CLINIC | Age: 64
End: 2024-02-05
Payer: COMMERCIAL

## 2024-02-05 VITALS
TEMPERATURE: 98 F | HEIGHT: 59 IN | BODY MASS INDEX: 36.44 KG/M2 | WEIGHT: 180.75 LBS | DIASTOLIC BLOOD PRESSURE: 78 MMHG | OXYGEN SATURATION: 96 % | HEART RATE: 67 BPM | SYSTOLIC BLOOD PRESSURE: 112 MMHG

## 2024-02-05 DIAGNOSIS — R30.0 DYSURIA: Primary | ICD-10-CM

## 2024-02-05 DIAGNOSIS — N39.0 URINARY TRACT INFECTION WITH HEMATURIA, SITE UNSPECIFIED: ICD-10-CM

## 2024-02-05 DIAGNOSIS — Z87.442 PERSONAL HISTORY OF KIDNEY STONES: ICD-10-CM

## 2024-02-05 DIAGNOSIS — R31.9 URINARY TRACT INFECTION WITH HEMATURIA, SITE UNSPECIFIED: ICD-10-CM

## 2024-02-05 LAB
BILIRUB SERPL-MCNC: NEGATIVE MG/DL
BLOOD URINE, POC: 250
COLOR, POC UA: NORMAL
GLUCOSE UR QL STRIP: NORMAL
KETONES UR QL STRIP: NEGATIVE
LEUKOCYTE ESTERASE URINE, POC: NORMAL
NITRITE, POC UA: NEGATIVE
PH, POC UA: 5
PROTEIN, POC: 30
SPECIFIC GRAVITY, POC UA: 1.01
UROBILINOGEN, POC UA: NORMAL

## 2024-02-05 PROCEDURE — 99214 OFFICE O/P EST MOD 30 MIN: CPT | Mod: S$GLB,,, | Performed by: NURSE PRACTITIONER

## 2024-02-05 PROCEDURE — 99999 PR PBB SHADOW E&M-EST. PATIENT-LVL IV: CPT | Mod: PBBFAC,,, | Performed by: NURSE PRACTITIONER

## 2024-02-05 PROCEDURE — 81001 URINALYSIS AUTO W/SCOPE: CPT | Mod: S$GLB,,, | Performed by: NURSE PRACTITIONER

## 2024-02-05 PROCEDURE — 87086 URINE CULTURE/COLONY COUNT: CPT | Performed by: NURSE PRACTITIONER

## 2024-02-05 PROCEDURE — 3074F SYST BP LT 130 MM HG: CPT | Mod: CPTII,S$GLB,, | Performed by: NURSE PRACTITIONER

## 2024-02-05 PROCEDURE — 3008F BODY MASS INDEX DOCD: CPT | Mod: CPTII,S$GLB,, | Performed by: NURSE PRACTITIONER

## 2024-02-05 PROCEDURE — 1159F MED LIST DOCD IN RCRD: CPT | Mod: CPTII,S$GLB,, | Performed by: NURSE PRACTITIONER

## 2024-02-05 PROCEDURE — 3078F DIAST BP <80 MM HG: CPT | Mod: CPTII,S$GLB,, | Performed by: NURSE PRACTITIONER

## 2024-02-05 PROCEDURE — 1160F RVW MEDS BY RX/DR IN RCRD: CPT | Mod: CPTII,S$GLB,, | Performed by: NURSE PRACTITIONER

## 2024-02-05 RX ORDER — KETOROLAC TROMETHAMINE 10 MG/1
10 TABLET, FILM COATED ORAL EVERY 6 HOURS PRN
Qty: 20 TABLET | Refills: 0 | Status: SHIPPED | OUTPATIENT
Start: 2024-02-05 | End: 2024-02-10

## 2024-02-05 RX ORDER — CIPROFLOXACIN 250 MG/1
250 TABLET, FILM COATED ORAL EVERY 12 HOURS
Qty: 14 TABLET | Refills: 0 | Status: SHIPPED | OUTPATIENT
Start: 2024-02-05 | End: 2024-02-12

## 2024-02-05 RX ORDER — TAMSULOSIN HYDROCHLORIDE 0.4 MG/1
0.4 CAPSULE ORAL DAILY
Qty: 30 CAPSULE | Refills: 0 | Status: SHIPPED | OUTPATIENT
Start: 2024-02-05 | End: 2024-04-18

## 2024-02-05 NOTE — TELEPHONE ENCOUNTER
Spoke with pt. Pt states that she has been having blood in urine and uti symptoms. Pt states that she thinks she may have a kidney stone. Pt states that nothing has come out yet. Matilde approved pt to come in today at 9:30 am. Pt states that she can make the appt. Pt scheduled today at 9:30 am.

## 2024-02-05 NOTE — TELEPHONE ENCOUNTER
----- Message from Yoni Mohr sent at 2/5/2024  7:18 AM CST -----  Contact: Pt. Portal  .Type:  Same Day Appointment Request    Caller is requesting a same day appointment.  Caller declined first available appointment listed below.    Name of Caller:pt  When is the first available appointment?  Symptoms: bleeding in urine  possible infection/ kidney stone  Best Call Back Number: [phone]  Additional Information:

## 2024-02-05 NOTE — PROGRESS NOTES
"Subjective:       Patient ID: Hallie Blake is a 63 y.o. female.    Chief Complaint: Urinary Tract Infection (Lower ABD pain, blood in urine, pressure, burning when urinating)    Patient is a 62 year old white female with Hyperlipidemia, Chronic GERD with small hiatal hernia seen on EGD, chronic allergies, history of acute stress reaction, S/P Rotator Cuff Tear to right shoulder on 1/20/2021, Hepatic Steatosis and benign simple liver cyst seen on CT 10/2021, and personal history of kidney stones followed by Dr. Odonnell that is here today for urinary symptoms.    Urinary Tract Infection   This is a new problem. Episode onset: started on Friday 2/2/24. The problem occurs every urination. The problem has been unchanged. The quality of the pain is described as aching and burning. The pain is at a severity of 8/10. The pain is mild. There has been no fever. She is Sexually active. There is No history of pyelonephritis. Associated symptoms include frequency, hematuria, hesitancy and urgency. Pertinent negatives include no discharge, flank pain, nausea or sweats. She has tried nothing for the symptoms. Her past medical history is significant for kidney stones.       Review of Systems   Gastrointestinal:  Negative for nausea.   Genitourinary:  Positive for frequency, hematuria, hesitancy and urgency. Negative for flank pain.         Objective:     Vitals:    02/05/24 0939   BP: 112/78   BP Location: Right arm   Patient Position: Sitting   BP Method: Large (Manual)   Pulse: 67   Temp: 97.5 °F (36.4 °C)   TempSrc: Temporal   SpO2: 96%   Weight: 82 kg (180 lb 12.4 oz)   Height: 4' 11" (1.499 m)          Physical Exam  Constitutional:       General: She is not in acute distress.     Appearance: Normal appearance. She is obese. She is not toxic-appearing or diaphoretic.      Comments: Body mass index is 36.51 kg/m².     Cardiovascular:      Rate and Rhythm: Normal rate and regular rhythm.      Heart sounds: Normal heart sounds. "   Pulmonary:      Effort: Pulmonary effort is normal. No respiratory distress.   Abdominal:      General: There is no distension.      Tenderness: There is no right CVA tenderness or left CVA tenderness.   Neurological:      Mental Status: She is alert and oriented to person, place, and time.   Psychiatric:         Mood and Affect: Mood normal.         Behavior: Behavior normal.             Assessment:         ICD-10-CM ICD-9-CM   1. Dysuria  R30.0 788.1   2. Urinary tract infection with hematuria, site unspecified  N39.0 599.0    R31.9 599.70   3. Personal history of kidney stones  Z87.442 V13.01       Plan:       Dysuria  + leukocytes and blood on dipstick  See treatment below.  -     POCT urinalysis, dipstick or tablet reag  -     Urine culture    Urinary tract infection with hematuria, site unspecified  + UTI with 2+ leukocytes  Will send for urine culture  Take antibiotic until completed  Will add on flomax and toradol due to hematuria and history of kidney stones  Will return for worsening pain.  -     ciprofloxacin HCl (CIPRO) 250 MG tablet; Take 1 tablet (250 mg total) by mouth every 12 (twelve) hours. for 7 days  Dispense: 14 tablet; Refill: 0  -     tamsulosin (FLOMAX) 0.4 mg Cap; Take 1 capsule (0.4 mg total) by mouth once daily.  Dispense: 30 capsule; Refill: 0  -     ketorolac (TORADOL) 10 mg tablet; Take 1 tablet (10 mg total) by mouth every 6 (six) hours as needed for Pain.  Dispense: 20 tablet; Refill: 0    Personal history of kidney stones  -     tamsulosin (FLOMAX) 0.4 mg Cap; Take 1 capsule (0.4 mg total) by mouth once daily.  Dispense: 30 capsule; Refill: 0  -     ketorolac (TORADOL) 10 mg tablet; Take 1 tablet (10 mg total) by mouth every 6 (six) hours as needed for Pain.  Dispense: 20 tablet; Refill: 0      Follow up if symptoms worsen or fail to improve.     Patient's Medications   New Prescriptions    CIPROFLOXACIN HCL (CIPRO) 250 MG TABLET    Take 1 tablet (250 mg total) by mouth every 12  (twelve) hours. for 7 days    KETOROLAC (TORADOL) 10 MG TABLET    Take 1 tablet (10 mg total) by mouth every 6 (six) hours as needed for Pain.    TAMSULOSIN (FLOMAX) 0.4 MG CAP    Take 1 capsule (0.4 mg total) by mouth once daily.   Previous Medications    ATORVASTATIN (LIPITOR) 40 MG TABLET    TAKE 1 TABLET BY MOUTH EVERY DAY    PANTOPRAZOLE (PROTONIX) 40 MG TABLET    Take 1 tablet (40 mg total) by mouth once daily.   Modified Medications    No medications on file   Discontinued Medications    No medications on file       Past Medical History:   Diagnosis Date    Hyperlipidemia     Kidney stone     Liver cyst 10/21/2021    9 mm hepatic cyst - typically benign and requires no further workup.    Other hyperlipidemia        Past Surgical History:   Procedure Laterality Date    ARTHROSCOPIC DEBRIDEMENT OF SHOULDER Right 2021    Procedure: DEBRIDEMENT LABRUM AND CALCIUM DEPOSIT, SHOULDER, ARTHROSCOPIC;  Surgeon: Thaddeus Rider MD;  Location: [facility] OR;  Service: Orthopedics;  Laterality: Right;    ARTHROSCOPIC REPAIR OF ROTATOR CUFF OF SHOULDER Right 2021    Procedure: REPAIR, ROTATOR CUFF, ARTHROSCOPIC;  Surgeon: Thaddeus Rider MD;  Location: [facility] OR;  Service: Orthopedics;  Laterality: Right;  regional w/catheter (interscalene)    COLONOSCOPY  2016    normal - repeat in 10 year - Dr. Damon Contreras = [facility]    CYSTOSCOPY W/ LASER LITHOTRIPSY      ESOPHAGOGASTRODUODENOSCOPY  2016    Dr. Contreras - Grade B reflux esophagitis and small hiatus hernia    TONSILLECTOMY         Family History   Problem Relation Age of Onset    Heart disease Mother         passed age 83 heart attack    Diabetes Mother     Diabetes Father     Heart disease Father         CABG around age 65;  heart failure at 68    No Known Problems Sister     Hypertension Brother     Hyperlipidemia Brother     Diabetes Brother     Thyroid disease Sister     Hyperlipidemia Sister     Diabetes Brother      Thyroid disease Brother     Myasthenia gravis Brother     No Known Problems Daughter     No Known Problems Daughter        Social History     Socioeconomic History    Marital status:    Occupational History    Occupation:    Tobacco Use    Smoking status: Never     Passive exposure: Current    Smokeless tobacco: Never   Substance and Sexual Activity    Alcohol use: Not Currently     Comment: social    Drug use: No    Sexual activity: Yes     Partners: Male

## 2024-02-06 LAB — BACTERIA UR CULT: NO GROWTH

## 2024-02-07 NOTE — PROGRESS NOTES
Subjective:       Patient ID: Hallie Blake is a 63 y.o. female.    Chief Complaint: right lower quadrant pain     This is a 63 y.o.  female patient that is new to me but not new to the system.  This a past patient of Dr. Odonnell who is no establishing care with me. Patient was self referred  for kidney stones, UTI symptoms. She has history of kidney stones. Patient saw PCP 4 days ago for UTI symptoms consisting of frequency, hematuria, dysuria. Hesitancy, and urgency. She was started on a 7 day course of ciprofloxacin.  Urine culture 2/5/24- negative for bacteria.  Today patient has complaints of right lower quadrant pain that is intermittent and aching pain, radiates to the suprapubic area. Reports feeling right flank pain that was sharp and intermittent but it has not been as often in the past couple of days. Reports vomiting once 4 days ago. She has taken the Toradol once for pain. Taking the flomax and ciprofloxacin as prescribed. Reports hematuria for the past 4 days, does not occur with every void, last noticed blood yesterday.           Lab Results   Component Value Date    CREATININE 0.66 10/12/2023       ---  PMH/PSH/Medications/Allergies/Social history reviewed and as in chart.    Review of Systems   Constitutional:  Negative for chills and fever.   Respiratory:  Negative for shortness of breath.    Cardiovascular:  Negative for chest pain and palpitations.   Gastrointestinal:  Positive for abdominal pain (RLQ). Negative for constipation and diarrhea.   Genitourinary:  Positive for flank pain (right) and hematuria. Negative for difficulty urinating, dysuria, frequency, pelvic pain, urgency and vaginal pain.   Neurological:  Negative for dizziness and weakness.   Psychiatric/Behavioral:  Negative for agitation, confusion and sleep disturbance.        Objective:      Physical Exam  Abdominal:      Tenderness: There is abdominal tenderness (when palpating RLQ). There is no right CVA tenderness or left CVA  tenderness.   Musculoskeletal:         General: Normal range of motion.      Cervical back: Normal range of motion.   Skin:     General: Skin is warm and dry.   Neurological:      Mental Status: She is alert and oriented to person, place, and time.         Assessment:     Problem Noted   Right Lower Quadrant Abdominal Pain 2/8/2024   Right Flank Pain 2/8/2024       Plan:     CT scan scheduled.  Strategies for stone prevention discussed.  This includes limiting salt and red meat, hydration (preferentially with water) to ensure at least 2.5 liters of urine output daily, adding citrate to diet with lemon juice or suitable alternative, limit soda and tea, and only consume recommended normal doses of OTC vitamins/supplements.  Literature was provided.     If intractable pain, nausea and vomiting limiting PO intake, fever needs to go to ED.     Continue flomax and take toradol PRN for pain.  Informed patient that if CT scan comes back without cause of pain and hematuria that further work-up should be done due to symptoms. Dicussed scheduling a cystoscopy and CT urogram, patient verbalized understanding.  Follow-up pending CT results.    LOTTIE Esparza    I spent a total of 25 minutes on the day of the visit.This includes face to face time and non-face to face time preparing to see the patient (eg, review of tests), obtaining and/or reviewing separately obtained history, documenting clinical information in the electronic or other health record, independently interpreting results and communicating results to the patient/family/caregiver, or care coordinator.

## 2024-02-07 NOTE — PROGRESS NOTES
Spoke with patient on phone.  Advised patient that urine culture showed no bacterial infection.  Patient does have history of stones so advised to continue the flomax and toradol for pain and if symptoms do not improve - see urology at end of week to reassess.

## 2024-02-08 ENCOUNTER — TELEPHONE (OUTPATIENT)
Dept: UROLOGY | Facility: CLINIC | Age: 64
End: 2024-02-08

## 2024-02-08 ENCOUNTER — OFFICE VISIT (OUTPATIENT)
Dept: UROLOGY | Facility: CLINIC | Age: 64
End: 2024-02-08
Payer: COMMERCIAL

## 2024-02-08 VITALS
BODY MASS INDEX: 36.35 KG/M2 | HEART RATE: 73 BPM | SYSTOLIC BLOOD PRESSURE: 129 MMHG | HEIGHT: 59 IN | DIASTOLIC BLOOD PRESSURE: 84 MMHG | WEIGHT: 180.31 LBS

## 2024-02-08 DIAGNOSIS — R10.9 RIGHT FLANK PAIN: ICD-10-CM

## 2024-02-08 DIAGNOSIS — R10.31 RIGHT LOWER QUADRANT ABDOMINAL PAIN: Primary | ICD-10-CM

## 2024-02-08 DIAGNOSIS — N22 CALCULUS OF URINARY TRACT IN DISEASES CLASSIFIED ELSEWHERE: Primary | ICD-10-CM

## 2024-02-08 PROCEDURE — 3079F DIAST BP 80-89 MM HG: CPT | Mod: CPTII,S$GLB,,

## 2024-02-08 PROCEDURE — 3074F SYST BP LT 130 MM HG: CPT | Mod: CPTII,S$GLB,,

## 2024-02-08 PROCEDURE — 1160F RVW MEDS BY RX/DR IN RCRD: CPT | Mod: CPTII,S$GLB,,

## 2024-02-08 PROCEDURE — 1159F MED LIST DOCD IN RCRD: CPT | Mod: CPTII,S$GLB,,

## 2024-02-08 PROCEDURE — 3008F BODY MASS INDEX DOCD: CPT | Mod: CPTII,S$GLB,,

## 2024-02-08 PROCEDURE — 99999 PR PBB SHADOW E&M-EST. PATIENT-LVL III: CPT | Mod: PBBFAC,,,

## 2024-02-08 PROCEDURE — 99213 OFFICE O/P EST LOW 20 MIN: CPT | Mod: S$GLB,,,

## 2024-02-08 NOTE — TELEPHONE ENCOUNTER
Talked with patient on the phone about her CT scan results. Informed her that she does have a 4.3mm stone in her right ureter as well as bilateral stones in her kidneys. Options for small ureteral stone discussed:  MET (medical expulsive therapy), ureteroscopy/stent, ESWL.  Risks, benefits and alternatives of each discussed. Patient wishes to proceed with MET. Instructed her to keep taking flomax daily, drink 2.5L of water per day, take Toradol for pain. If intractable pain, nausea and vomiting limiting PO intake, fever needs to go to ED.  Options for small ureteral stone discussed:  MET (medical expulsive therapy), ureteroscopy/stent, ESWL.  Risks, benefits and alternatives of each discussed. Agreed to repeat CT scan in 1 month and follow-up with results.

## 2024-04-04 DIAGNOSIS — E78.49 OTHER HYPERLIPIDEMIA: ICD-10-CM

## 2024-04-04 RX ORDER — ATORVASTATIN CALCIUM 40 MG/1
TABLET, FILM COATED ORAL
Qty: 90 TABLET | Refills: 1 | Status: SHIPPED | OUTPATIENT
Start: 2024-04-04

## 2024-04-18 ENCOUNTER — OFFICE VISIT (OUTPATIENT)
Dept: FAMILY MEDICINE | Facility: CLINIC | Age: 64
End: 2024-04-18
Payer: COMMERCIAL

## 2024-04-18 VITALS
HEART RATE: 66 BPM | BODY MASS INDEX: 36.43 KG/M2 | SYSTOLIC BLOOD PRESSURE: 130 MMHG | DIASTOLIC BLOOD PRESSURE: 84 MMHG | HEIGHT: 59 IN | WEIGHT: 180.69 LBS | OXYGEN SATURATION: 96 % | TEMPERATURE: 98 F

## 2024-04-18 DIAGNOSIS — E66.01 CLASS 2 SEVERE OBESITY DUE TO EXCESS CALORIES WITH SERIOUS COMORBIDITY AND BODY MASS INDEX (BMI) OF 36.0 TO 36.9 IN ADULT: ICD-10-CM

## 2024-04-18 DIAGNOSIS — K76.0 HEPATIC STEATOSIS: ICD-10-CM

## 2024-04-18 DIAGNOSIS — K44.9 HIATAL HERNIA: ICD-10-CM

## 2024-04-18 DIAGNOSIS — K21.9 CHRONIC GERD: ICD-10-CM

## 2024-04-18 DIAGNOSIS — E78.49 OTHER HYPERLIPIDEMIA: Primary | ICD-10-CM

## 2024-04-18 DIAGNOSIS — K76.89 LIVER CYST: ICD-10-CM

## 2024-04-18 DIAGNOSIS — J30.89 ENVIRONMENTAL AND SEASONAL ALLERGIES: ICD-10-CM

## 2024-04-18 DIAGNOSIS — Z87.442 PERSONAL HISTORY OF KIDNEY STONES: ICD-10-CM

## 2024-04-18 PROBLEM — R10.31 RIGHT LOWER QUADRANT ABDOMINAL PAIN: Status: RESOLVED | Noted: 2024-02-08 | Resolved: 2024-04-18

## 2024-04-18 PROBLEM — R10.9 RIGHT FLANK PAIN: Status: RESOLVED | Noted: 2024-02-08 | Resolved: 2024-04-18

## 2024-04-18 PROCEDURE — 99214 OFFICE O/P EST MOD 30 MIN: CPT | Mod: S$GLB,,, | Performed by: NURSE PRACTITIONER

## 2024-04-18 PROCEDURE — 3079F DIAST BP 80-89 MM HG: CPT | Mod: CPTII,S$GLB,, | Performed by: NURSE PRACTITIONER

## 2024-04-18 PROCEDURE — 3075F SYST BP GE 130 - 139MM HG: CPT | Mod: CPTII,S$GLB,, | Performed by: NURSE PRACTITIONER

## 2024-04-18 PROCEDURE — 1159F MED LIST DOCD IN RCRD: CPT | Mod: CPTII,S$GLB,, | Performed by: NURSE PRACTITIONER

## 2024-04-18 PROCEDURE — 99999 PR PBB SHADOW E&M-EST. PATIENT-LVL III: CPT | Mod: PBBFAC,,, | Performed by: NURSE PRACTITIONER

## 2024-04-18 PROCEDURE — 1160F RVW MEDS BY RX/DR IN RCRD: CPT | Mod: CPTII,S$GLB,, | Performed by: NURSE PRACTITIONER

## 2024-04-18 PROCEDURE — 3008F BODY MASS INDEX DOCD: CPT | Mod: CPTII,S$GLB,, | Performed by: NURSE PRACTITIONER

## 2024-04-18 NOTE — PROGRESS NOTES
"Subjective:       Patient ID: Hallie Blake is a 63 y.o. female.    Chief Complaint: Follow-up (6 months F/U)    Follow-up        Patient is a 63 year old white female with Hyperlipidemia, Chronic GERD with small hiatal hernia seen on EGD, chronic allergies, history of acute stress reaction, S/P Rotator Cuff Tear to right shoulder on 1/20/2021, Hepatic Steatosis and benign simple liver cyst seen on CT 10/2021, and personal history of kidney stones followed by [facility] NP [facility]ler that is here today for 6 month follow up with fasting lab results.     All chronic problems address under Plan Of Care below.      Vitals:    04/18/24 0735   BP: 130/84   BP Location: Left arm   Patient Position: Sitting   BP Method: Large (Manual)   Pulse: 66   Temp: 97.7 °F (36.5 °C)   TempSrc: Temporal   SpO2: 96%   Weight: 81.9 kg (180 lb 10.7 oz)   Height: 4' 11" (1.499 m)     Lab Visit on 04/16/2024   Component Date Value Ref Range Status    Sodium 04/16/2024 141  136 - 145 mmol/L Final    Potassium 04/16/2024 4.3  3.5 - 5.1 mmol/L Final    Chloride 04/16/2024 109  95 - 110 mmol/L Final    CO2 04/16/2024 24  23 - 29 mmol/L Final    Glucose 04/16/2024 91  70 - 110 mg/dL Final    BUN 04/16/2024 14  8 - 23 mg/dL Final    Creatinine 04/16/2024 0.7  0.5 - 1.4 mg/dL Final    Calcium 04/16/2024 9.2  8.7 - 10.5 mg/dL Final    Total Protein 04/16/2024 6.7  6.0 - 8.4 g/dL Final    Albumin 04/16/2024 4.0  3.5 - 5.2 g/dL Final    Total Bilirubin 04/16/2024 0.8  0.1 - 1.0 mg/dL Final    Comment: For infants and newborns, interpretation of results should be based  on gestational age, weight and in agreement with clinical  observations.    Premature Infant recommended reference ranges:  Up to 24 hours.............<8.0 mg/dL  Up to 48 hours............<12.0 mg/dL  3-5 days..................<15.0 mg/dL  6-29 days.................<15.0 mg/dL      Alkaline Phosphatase 04/16/2024 139 (H)  55 - 135 U/L Final    AST 04/16/2024 23  10 - 40 U/L Final    ALT " 04/16/2024 21  10 - 44 U/L Final    eGFR 04/16/2024 >60.0  >60 mL/min/1.73 m^2 Final    Anion Gap 04/16/2024 8  8 - 16 mmol/L Final    Cholesterol 04/16/2024 155  120 - 199 mg/dL Final    Comment: The National Cholesterol Education Program (NCEP) has set the  following guidelines (reference ranges) for Cholesterol:  Optimal.....................<200 mg/dL  Borderline High.............200-239 mg/dL  High........................> or = 240 mg/dL      Triglycerides 04/16/2024 114  30 - 150 mg/dL Final    Comment: The National Cholesterol Education Program (NCEP) has set the  following guidelines (reference values) for triglycerides:  Normal......................<150 mg/dL  Borderline High.............150-199 mg/dL  High........................200-499 mg/dL      HDL 04/16/2024 49  40 - 75 mg/dL Final    Comment: The National Cholesterol Education Program (NCEP) has set the  following guidelines (reference values) for HDL Cholesterol:  Low...............<40 mg/dL  Optimal...........>60 mg/dL      LDL Cholesterol 04/16/2024 83.2  63.0 - 159.0 mg/dL Final    Comment: The National Cholesterol Education Program (NCEP) has set the  following guidelines (reference values) for LDL Cholesterol:  Optimal.......................<130 mg/dL  Borderline High...............130-159 mg/dL  High..........................160-189 mg/dL  Very High.....................>190 mg/dL      HDL/Cholesterol Ratio 04/16/2024 31.6  20.0 - 50.0 % Final    Total Cholesterol/HDL Ratio 04/16/2024 3.2  2.0 - 5.0 Final    Non-HDL Cholesterol 04/16/2024 106  mg/dL Final    Comment: Risk category and Non-HDL cholesterol goals:  Coronary heart disease (CHD)or equivalent (10-year risk of CHD >20%):  Non-HDL cholesterol goal     <130 mg/dL  Two or more CHD risk factors and 10-year risk of CHD <= 20%:  Non-HDL cholesterol goal     <160 mg/dL  0 to 1 CHD risk factor:  Non-HDL cholesterol goal     <190 mg/dL         Assessment:         ICD-10-CM ICD-9-CM   1. Other  hyperlipidemia  E78.49 272.4   2. Chronic GERD  K21.9 530.81   3. Hiatal hernia  K44.9 553.3   4. Environmental and seasonal allergies  J30.89 477.8   5. Personal history of kidney stones  Z87.442 V13.01   6. Hepatic steatosis  K76.0 571.8   7. Liver cyst  K76.89 573.8   8. Class 2 severe obesity due to excess calories with serious comorbidity and body mass index (BMI) of 36.0 to 36.9 in adult  E66.01 278.01    Z68.36 V85.36       Plan:       1. Other hyperlipidemia  Overview:  takes Atorvastatin 40 mg daily.   LDL 83.2  Stable  Recheck in 6 months for wellness exam        2. Chronic GERD  Overview:  She had her last EGD in July 2016 that showed grade B reflux esophagitis and a small hiatus hernia with esophagus dilated by Dr. Contreras.   She reports taking Protonix around twice weekly.    Stable.      3. Hiatal hernia  Overview:  EGD in July 2016 that showed grade B reflux esophagitis and a small hiatus hernia with esophagus dilated by Dr. Contreras.   She reports taking Protonix around twice weekly.     Stable.      4. Environmental and seasonal allergies  Overview:  Nasocort prn  stable      5. Personal history of kidney stones  Overview:  Reports last kidney stone episode was 2/15/24 treated in ER in [place] with pain medications - passed on her own - right side.  Followed by [facility] - Nathalia Kelly on 2/8/2024.  Advised patient to advise SVITLANA Kelly about the kidney stone treatment in [place] on 2/15/2024.         6. Hepatic steatosis  Overview:  Hepatic steatosis with simple liver cyst   seen on CT scan 10/2021.    Liver enzymes are normal and patient is working on weight loss - she is on weight watchers.      7. Liver cyst  Overview:  9 mm hepatic cyst - typically benign and requires no further workup.  Stable on CT scans 2021 and 2024.      8. Class 2 severe obesity due to excess calories with serious comorbidity and body mass index (BMI) of 36.0 to 36.9 in adult  Overview:  Body mass index is 36.49  kg/m².  Working on lifestyle modifications.         Follow up in about 6 months (around 10/18/2024) for fasting labs and INITIAL MEDICARE WELLNESS EXAM.     Patient's Medications   New Prescriptions    No medications on file   Previous Medications    ATORVASTATIN (LIPITOR) 40 MG TABLET    TAKE 1 TABLET BY MOUTH EVERY DAY    PANTOPRAZOLE (PROTONIX) 40 MG TABLET    Take 1 tablet (40 mg total) by mouth once daily.   Modified Medications    No medications on file   Discontinued Medications    TAMSULOSIN (FLOMAX) 0.4 MG CAP    Take 1 capsule (0.4 mg total) by mouth once daily.         Review of Systems   HENT: Negative.     Respiratory: Negative.     Cardiovascular: Negative.    Gastrointestinal: Negative.          Objective:        Physical Exam  Constitutional:       General: She is not in acute distress.     Appearance: She is well-developed. She is obese. She is not ill-appearing, toxic-appearing or diaphoretic.      Comments: + obesity. Body mass index is 35.27 kg/m².           HENT:      Head: Normocephalic and atraumatic.      Right Ear: External ear normal. Tympanic membrane is scarred and perforated.      Left Ear: External ear normal. Tympanic membrane is scarred and perforated.      Ears:     Eyes:      Pupils: Pupils are equal, round, and reactive to light.   Neck:      Thyroid: No thyromegaly.      Trachea: No tracheal deviation.   Cardiovascular:      Rate and Rhythm: Normal rate and regular rhythm.      Heart sounds: Normal heart sounds. No murmur heard.  Pulmonary:      Effort: Pulmonary effort is normal. No respiratory distress.      Breath sounds: Normal breath sounds.   Abdominal:      General: Bowel sounds are normal. There is no distension.      Palpations: Abdomen is soft. There is no mass.      Tenderness: There is no abdominal tenderness. There is no guarding or rebound.      Hernia: No hernia is present.   Musculoskeletal:      Cervical back: Normal range of motion and neck supple.    Lymphadenopathy:      Cervical: No cervical adenopathy.   Skin:     General: Skin is warm and dry.      Findings: No rash.   Neurological:      Mental Status: She is alert and oriented to person, place, and time.      Cranial Nerves: No cranial nerve deficit.      Coordination: Coordination normal.   Psychiatric:         Mood and Affect: Mood normal.         Speech: Speech normal.         Behavior: Behavior normal. Behavior is cooperative.         Thought Content: Thought content normal. Thought content does not include suicidal ideation. Thought content does not include suicidal plan.             Past Medical History:   Diagnosis Date    Allergy     Hyperlipidemia     Kidney stone     Liver cyst 10/21/2021    9 mm hepatic cyst - typically benign and requires no further workup.    Other hyperlipidemia     Urinary tract infection        Past Surgical History:   Procedure Laterality Date    ARTHROSCOPIC DEBRIDEMENT OF SHOULDER Right 2021    Procedure: DEBRIDEMENT LABRUM AND CALCIUM DEPOSIT, SHOULDER, ARTHROSCOPIC;  Surgeon: Thaddeus Rider MD;  Location: [facility] OR;  Service: Orthopedics;  Laterality: Right;    ARTHROSCOPIC REPAIR OF ROTATOR CUFF OF SHOULDER Right 2021    Procedure: REPAIR, ROTATOR CUFF, ARTHROSCOPIC;  Surgeon: Thaddeus Rider MD;  Location: [facility] OR;  Service: Orthopedics;  Laterality: Right;  regional w/catheter (interscalene)    COLONOSCOPY  2016    normal - repeat in 10 year - Dr. Damon Contreras = [facility]    CYSTOSCOPY W/ LASER LITHOTRIPSY      ESOPHAGOGASTRODUODENOSCOPY  2016    Dr. Contreras - Grade B reflux esophagitis and small hiatus hernia    TONSILLECTOMY         Family History   Problem Relation Name Age of Onset    Heart disease Mother La         passed age 83 heart attack    Diabetes Mother La     Diabetes Father Donal     Heart disease Father Donal         CABG around age 65;  heart failure at 68    No Known Problems Sister       Hypertension Brother Christopher     Hyperlipidemia Brother Christopher     Diabetes Brother Christopher     Thyroid disease Sister      Hyperlipidemia Sister      Diabetes Brother Jono     Thyroid disease Brother Jono     Myasthenia gravis Brother Jono     No Known Problems Daughter      No Known Problems Daughter         Social History     Socioeconomic History    Marital status:    Occupational History    Occupation:    Tobacco Use    Smoking status: Never     Passive exposure: Current    Smokeless tobacco: Never   Substance and Sexual Activity    Alcohol use: Not Currently     Comment: social    Drug use: No    Sexual activity: Yes     Partners: Male

## 2024-09-24 DIAGNOSIS — E78.49 OTHER HYPERLIPIDEMIA: ICD-10-CM

## 2024-09-24 DIAGNOSIS — K21.9 CHRONIC GERD: ICD-10-CM

## 2024-09-24 RX ORDER — ATORVASTATIN CALCIUM 40 MG/1
40 TABLET, FILM COATED ORAL DAILY
Qty: 90 TABLET | Refills: 0 | Status: SHIPPED | OUTPATIENT
Start: 2024-09-24

## 2024-09-24 RX ORDER — PANTOPRAZOLE SODIUM 40 MG/1
40 TABLET, DELAYED RELEASE ORAL DAILY
Qty: 90 TABLET | Refills: 0 | Status: SHIPPED | OUTPATIENT
Start: 2024-09-24

## 2024-10-01 ENCOUNTER — TELEPHONE (OUTPATIENT)
Dept: FAMILY MEDICINE | Facility: CLINIC | Age: 64
End: 2024-10-01
Payer: MEDICARE

## 2024-10-01 NOTE — TELEPHONE ENCOUNTER
----- Message from Matilde Isaacs NP sent at 4/18/2024  7:59 AM CDT -----  Regarding: Medicare wellness labs  Call patient to get MEDICARE new insurance cards so we can input labs for medicare wellness that is schedule at end of month.

## 2024-10-02 ENCOUNTER — PATIENT MESSAGE (OUTPATIENT)
Dept: FAMILY MEDICINE | Facility: CLINIC | Age: 64
End: 2024-10-02
Payer: MEDICARE

## 2024-10-02 DIAGNOSIS — E78.49 OTHER HYPERLIPIDEMIA: Primary | ICD-10-CM

## 2024-10-02 DIAGNOSIS — Z13.1 DIABETES MELLITUS SCREENING: ICD-10-CM

## 2024-10-02 DIAGNOSIS — E66.01 CLASS 2 SEVERE OBESITY DUE TO EXCESS CALORIES WITH SERIOUS COMORBIDITY AND BODY MASS INDEX (BMI) OF 36.0 TO 36.9 IN ADULT: ICD-10-CM

## 2024-10-02 DIAGNOSIS — E66.812 CLASS 2 SEVERE OBESITY DUE TO EXCESS CALORIES WITH SERIOUS COMORBIDITY AND BODY MASS INDEX (BMI) OF 36.0 TO 36.9 IN ADULT: ICD-10-CM

## 2024-10-03 NOTE — TELEPHONE ENCOUNTER
Neelima - the patient had sent you message over portal in response to your message and she sent us her medicare insurance cards.    You just need to set up a lab appt and link labs prior to her medicare wellness appt that is scheduled.

## 2024-10-11 DIAGNOSIS — E78.49 OTHER HYPERLIPIDEMIA: ICD-10-CM

## 2024-10-11 RX ORDER — ATORVASTATIN CALCIUM 40 MG/1
40 TABLET, FILM COATED ORAL
Qty: 90 TABLET | Refills: 3 | Status: SHIPPED | OUTPATIENT
Start: 2024-10-11

## 2024-10-29 ENCOUNTER — OFFICE VISIT (OUTPATIENT)
Dept: FAMILY MEDICINE | Facility: CLINIC | Age: 64
End: 2024-10-29
Payer: MEDICARE

## 2024-10-29 VITALS
HEIGHT: 59 IN | WEIGHT: 182.13 LBS | HEART RATE: 80 BPM | TEMPERATURE: 98 F | BODY MASS INDEX: 36.72 KG/M2 | OXYGEN SATURATION: 98 % | SYSTOLIC BLOOD PRESSURE: 110 MMHG | DIASTOLIC BLOOD PRESSURE: 84 MMHG

## 2024-10-29 DIAGNOSIS — E66.01 CLASS 2 SEVERE OBESITY DUE TO EXCESS CALORIES WITH SERIOUS COMORBIDITY AND BODY MASS INDEX (BMI) OF 36.0 TO 36.9 IN ADULT: ICD-10-CM

## 2024-10-29 DIAGNOSIS — E78.2 MIXED HYPERLIPIDEMIA: ICD-10-CM

## 2024-10-29 DIAGNOSIS — Z00.00 MEDICARE ANNUAL WELLNESS VISIT, INITIAL: Primary | ICD-10-CM

## 2024-10-29 DIAGNOSIS — Z87.442 PERSONAL HISTORY OF KIDNEY STONES: ICD-10-CM

## 2024-10-29 DIAGNOSIS — Z78.0 ASYMPTOMATIC MENOPAUSAL STATE: ICD-10-CM

## 2024-10-29 DIAGNOSIS — K76.89 LIVER CYST: ICD-10-CM

## 2024-10-29 DIAGNOSIS — H10.9 CONJUNCTIVITIS OF BOTH EYES, UNSPECIFIED CONJUNCTIVITIS TYPE: ICD-10-CM

## 2024-10-29 DIAGNOSIS — J30.89 ENVIRONMENTAL AND SEASONAL ALLERGIES: ICD-10-CM

## 2024-10-29 DIAGNOSIS — K21.9 CHRONIC GERD: ICD-10-CM

## 2024-10-29 DIAGNOSIS — E66.812 CLASS 2 SEVERE OBESITY DUE TO EXCESS CALORIES WITH SERIOUS COMORBIDITY AND BODY MASS INDEX (BMI) OF 36.0 TO 36.9 IN ADULT: ICD-10-CM

## 2024-10-29 DIAGNOSIS — Z12.31 ENCOUNTER FOR SCREENING MAMMOGRAM FOR MALIGNANT NEOPLASM OF BREAST: ICD-10-CM

## 2024-10-29 DIAGNOSIS — J06.9 VIRAL URI WITH COUGH: ICD-10-CM

## 2024-10-29 DIAGNOSIS — K44.9 HIATAL HERNIA: ICD-10-CM

## 2024-10-29 DIAGNOSIS — K76.0 HEPATIC STEATOSIS: ICD-10-CM

## 2024-10-29 PROCEDURE — 3074F SYST BP LT 130 MM HG: CPT | Mod: CPTII,S$GLB,, | Performed by: NURSE PRACTITIONER

## 2024-10-29 PROCEDURE — G0402 INITIAL PREVENTIVE EXAM: HCPCS | Mod: S$GLB,,, | Performed by: NURSE PRACTITIONER

## 2024-10-29 PROCEDURE — 1159F MED LIST DOCD IN RCRD: CPT | Mod: CPTII,S$GLB,, | Performed by: NURSE PRACTITIONER

## 2024-10-29 PROCEDURE — 99214 OFFICE O/P EST MOD 30 MIN: CPT | Mod: 25,S$GLB,, | Performed by: NURSE PRACTITIONER

## 2024-10-29 PROCEDURE — 3288F FALL RISK ASSESSMENT DOCD: CPT | Mod: CPTII,S$GLB,, | Performed by: NURSE PRACTITIONER

## 2024-10-29 PROCEDURE — 99999 PR PBB SHADOW E&M-EST. PATIENT-LVL IV: CPT | Mod: PBBFAC,,, | Performed by: NURSE PRACTITIONER

## 2024-10-29 PROCEDURE — 1101F PT FALLS ASSESS-DOCD LE1/YR: CPT | Mod: CPTII,S$GLB,, | Performed by: NURSE PRACTITIONER

## 2024-10-29 PROCEDURE — 1158F ADVNC CARE PLAN TLK DOCD: CPT | Mod: CPTII,S$GLB,, | Performed by: NURSE PRACTITIONER

## 2024-10-29 PROCEDURE — 3079F DIAST BP 80-89 MM HG: CPT | Mod: CPTII,S$GLB,, | Performed by: NURSE PRACTITIONER

## 2024-10-29 PROCEDURE — 3008F BODY MASS INDEX DOCD: CPT | Mod: CPTII,S$GLB,, | Performed by: NURSE PRACTITIONER

## 2024-10-29 PROCEDURE — 1160F RVW MEDS BY RX/DR IN RCRD: CPT | Mod: CPTII,S$GLB,, | Performed by: NURSE PRACTITIONER

## 2024-10-29 RX ORDER — IBUPROFEN 600 MG/1
600 TABLET ORAL 3 TIMES DAILY
Qty: 21 TABLET | Refills: 0 | Status: SHIPPED | OUTPATIENT
Start: 2024-10-29 | End: 2024-11-05

## 2024-10-29 RX ORDER — POLYMYXIN B SULFATE AND TRIMETHOPRIM 1; 10000 MG/ML; [USP'U]/ML
1 SOLUTION OPHTHALMIC EVERY 4 HOURS
Qty: 10 ML | Refills: 0 | Status: SHIPPED | OUTPATIENT
Start: 2024-10-29

## 2024-10-29 RX ORDER — FLUTICASONE PROPIONATE 50 MCG
2 SPRAY, SUSPENSION (ML) NASAL DAILY
Qty: 16 G | Refills: 1 | Status: SHIPPED | OUTPATIENT
Start: 2024-10-29

## 2024-10-29 RX ORDER — BROMPHENIRAMINE MALEATE, PSEUDOEPHEDRINE HYDROCHLORIDE, AND DEXTROMETHORPHAN HYDROBROMIDE 2; 30; 10 MG/5ML; MG/5ML; MG/5ML
10 SYRUP ORAL EVERY 4 HOURS PRN
Qty: 240 ML | Refills: 0 | Status: SHIPPED | OUTPATIENT
Start: 2024-10-29

## 2024-11-20 PROBLEM — M85.89 OSTEOPENIA OF MULTIPLE SITES: Status: ACTIVE | Noted: 2024-11-20

## 2024-12-18 DIAGNOSIS — E78.49 OTHER HYPERLIPIDEMIA: ICD-10-CM

## 2024-12-18 DIAGNOSIS — K21.9 CHRONIC GERD: ICD-10-CM

## 2024-12-20 RX ORDER — ATORVASTATIN CALCIUM 40 MG/1
40 TABLET, FILM COATED ORAL
Qty: 90 TABLET | Refills: 3 | Status: SHIPPED | OUTPATIENT
Start: 2024-12-20

## 2024-12-20 RX ORDER — PANTOPRAZOLE SODIUM 40 MG/1
40 TABLET, DELAYED RELEASE ORAL
Qty: 90 TABLET | Refills: 3 | Status: SHIPPED | OUTPATIENT
Start: 2024-12-20

## 2025-02-18 ENCOUNTER — TELEPHONE (OUTPATIENT)
Dept: FAMILY MEDICINE | Facility: CLINIC | Age: 65
End: 2025-02-18
Payer: MEDICARE

## 2025-02-18 ENCOUNTER — OFFICE VISIT (OUTPATIENT)
Dept: FAMILY MEDICINE | Facility: CLINIC | Age: 65
End: 2025-02-18
Payer: MEDICARE

## 2025-02-18 VITALS
OXYGEN SATURATION: 97 % | SYSTOLIC BLOOD PRESSURE: 128 MMHG | BODY MASS INDEX: 37.36 KG/M2 | DIASTOLIC BLOOD PRESSURE: 89 MMHG | WEIGHT: 185.31 LBS | HEIGHT: 59 IN | TEMPERATURE: 98 F | HEART RATE: 71 BPM

## 2025-02-18 DIAGNOSIS — Z87.442 PERSONAL HISTORY OF KIDNEY STONES: Primary | ICD-10-CM

## 2025-02-18 DIAGNOSIS — R10.9 ACUTE FLANK PAIN: ICD-10-CM

## 2025-02-18 LAB
BILIRUB SERPL-MCNC: NORMAL MG/DL
BLOOD URINE, POC: NORMAL
CLARITY, POC UA: CLEAR
COLOR, POC UA: YELLOW
GLUCOSE UR QL STRIP: NORMAL
KETONES UR QL STRIP: NORMAL
LEUKOCYTE ESTERASE URINE, POC: NORMAL
NITRITE, POC UA: NORMAL
PH, POC UA: 5
PROTEIN, POC: 30
SPECIFIC GRAVITY, POC UA: 1.03
UROBILINOGEN, POC UA: NORMAL

## 2025-02-18 PROCEDURE — 81001 URINALYSIS AUTO W/SCOPE: CPT | Performed by: PEDIATRICS

## 2025-02-18 RX ORDER — TAMSULOSIN HYDROCHLORIDE 0.4 MG/1
0.4 CAPSULE ORAL DAILY
Qty: 30 CAPSULE | Refills: 1 | Status: SHIPPED | OUTPATIENT
Start: 2025-02-18 | End: 2026-02-18

## 2025-02-18 RX ORDER — CIPROFLOXACIN 500 MG/1
500 TABLET ORAL 2 TIMES DAILY
Qty: 10 TABLET | Refills: 0 | Status: SHIPPED | OUTPATIENT
Start: 2025-02-18 | End: 2025-02-23

## 2025-02-18 NOTE — TELEPHONE ENCOUNTER
----- Message from Mary sent at 2/18/2025 10:18 AM CST -----  Type:  RX Refill RequestWho Called:  Pt Refill or New Rx: Refill  RX Name and Strength: flomax  and antibiotics (pt does not remember name)Preferred Pharmacy with phone number:[facility]/pharmacy #[id number] - [place] - [id number] [place] [address]Vnqmy: [phone] or Mail Order: Local Ordering Provider: Shital  Would the patient rather a call back or a response via [facility]? Call Best Call Back Number:[phone] Additional Information: for kidney stones

## 2025-02-18 NOTE — TELEPHONE ENCOUNTER
Patient said she is having lower back pain on the left side- believe she having a kidney stone feels like it trying to get into her bladder- patient requesting RX for Flomax and antibiotics.

## 2025-02-18 NOTE — PROGRESS NOTES
Subjective:      Patient ID: Hallie Blake is a 64 y.o. female.    Chief Complaint: Low-back Pain (Possible kidney stone per pt )    Reports lower back pain on left side beginning 3 weeks ago. Reports pain is intermittent and nagging, feels like it is a kidney stone. Has used flomax in the past w/ good results. Previously saw Nathalia Keller. Has been increasing water intake, has not taken any medication for the pain.    Low-back Pain  Pertinent negatives include no arthralgias, chest pain, chills, congestion, coughing, fatigue, fever, headaches, nausea, rash, sore throat or vomiting.     Review of Systems   Constitutional:  Negative for chills, fatigue and fever.   HENT:  Negative for congestion, ear pain, postnasal drip, rhinorrhea, sinus pressure, sinus pain, sneezing and sore throat.    Respiratory:  Negative for cough, chest tightness, shortness of breath and wheezing.    Cardiovascular:  Negative for chest pain and palpitations.   Gastrointestinal:  Negative for diarrhea, nausea and vomiting.   Genitourinary:  Positive for flank pain and pelvic pain. Negative for difficulty urinating.   Musculoskeletal:  Negative for arthralgias.   Skin:  Negative for rash.   Neurological:  Negative for dizziness and headaches.   Psychiatric/Behavioral:  Negative for confusion.        Current Outpatient Medications on File Prior to Visit   Medication Sig    atorvastatin (LIPITOR) 40 MG tablet TAKE 1 TABLET ONE TIME DAILY    fluticasone propionate (FLONASE) 50 mcg/actuation nasal spray 2 sprays (100 mcg total) by Each Nostril route once daily.    pantoprazole (PROTONIX) 40 MG tablet TAKE 1 TABLET ONE TIME DAILY    brompheniramine-pseudoeph-DM (BROMFED DM) 2-30-10 mg/5 mL Syrp Take 10 mLs by mouth every 4 (four) hours as needed (congestion/cough). (Patient not taking: Reported on 2/18/2025)    polymyxin B sulf-trimethoprim (POLYTRIM) 10,000 unit- 1 mg/mL Drop Place 1 drop into both eyes every 4 (four) hours. (Patient not taking:  Reported on 2/18/2025)     No current facility-administered medications on file prior to visit.        Objective:     Vitals:    02/18/25 1422   BP: 128/89   Pulse: 71   Temp: 97.9 °F (36.6 °C)      Physical Exam  Constitutional:       General: She is not in acute distress.     Appearance: Normal appearance.   HENT:      Head: Normocephalic and atraumatic.      Right Ear: External ear normal.      Left Ear: External ear normal.      Nose: Nose normal.      Mouth/Throat:      Mouth: Mucous membranes are moist.      Pharynx: Oropharynx is clear.   Eyes:      Extraocular Movements: Extraocular movements intact.      Conjunctiva/sclera: Conjunctivae normal.      Pupils: Pupils are equal, round, and reactive to light.   Cardiovascular:      Rate and Rhythm: Normal rate and regular rhythm.      Pulses: Normal pulses.      Heart sounds: Normal heart sounds.   Pulmonary:      Effort: Pulmonary effort is normal. No respiratory distress.      Breath sounds: Normal breath sounds. No wheezing.   Abdominal:      General: Abdomen is flat.          Comments: Pain at marked area. Non reproducible   Musculoskeletal:         General: No swelling. Normal range of motion.      Cervical back: Normal range of motion and neck supple.        Back:       Comments: Flank pain left side.   Skin:     General: Skin is warm and dry.      Findings: No rash.   Neurological:      Mental Status: She is alert and oriented to person, place, and time.      Gait: Gait normal.   Psychiatric:         Mood and Affect: Mood normal.         Behavior: Behavior normal.        Assessment:         1. Personal history of kidney stones    2. Acute flank pain          Plan:   1. Personal history of kidney stones  - Urine culture  - Urinalysis  - POCT urine dipstick without microscope  - CT Renal Stone Study ABD Pelvis WO; Future    2. Acute flank pain  - Urine culture  - Urinalysis  - POCT urine dipstick without microscope  - CT Renal Stone Study ABD Pelvis WO;  Future         Encouraged patient to follow up with Urology.  Will complete urine today, encouraged to to CT renal stone if not resolved with medicine.  Encouraged to start abx and flomax.  Increase water intake.  Follow up as needed. If fever, chills, body aches go to ER.       Johnny Dyer NP   [facility]  2/18/25

## 2025-02-19 LAB
BACTERIA #/AREA URNS AUTO: ABNORMAL /HPF
BILIRUB UR QL STRIP: NEGATIVE
CLARITY UR REFRACT.AUTO: CLEAR
COLOR UR AUTO: YELLOW
GLUCOSE UR QL STRIP: NEGATIVE
HGB UR QL STRIP: NEGATIVE
KETONES UR QL STRIP: NEGATIVE
LEUKOCYTE ESTERASE UR QL STRIP: ABNORMAL
MICROSCOPIC COMMENT: ABNORMAL
NITRITE UR QL STRIP: NEGATIVE
PH UR STRIP: 6 [PH] (ref 5–8)
PROT UR QL STRIP: NEGATIVE
RBC #/AREA URNS AUTO: 1 /HPF (ref 0–4)
SP GR UR STRIP: 1.02 (ref 1–1.03)
SQUAMOUS #/AREA URNS AUTO: 2 /HPF
URN SPEC COLLECT METH UR: ABNORMAL
WBC #/AREA URNS AUTO: 19 /HPF (ref 0–5)

## 2025-02-19 NOTE — TELEPHONE ENCOUNTER
Please apologize to patient for me.  I had addressed her note and was going to schedule her with Johnny yesterday to be seen but my message DID NOT FORWARD to my staff so it was not done/computer error.    Put her on Shanelle's schedule tomorrow if she still wants to be seen.

## 2025-04-15 ENCOUNTER — OFFICE VISIT (OUTPATIENT)
Dept: FAMILY MEDICINE | Facility: CLINIC | Age: 65
End: 2025-04-15
Payer: MEDICARE

## 2025-04-15 VITALS
OXYGEN SATURATION: 96 % | SYSTOLIC BLOOD PRESSURE: 118 MMHG | HEART RATE: 72 BPM | HEIGHT: 59 IN | BODY MASS INDEX: 36.72 KG/M2 | WEIGHT: 182.13 LBS | DIASTOLIC BLOOD PRESSURE: 74 MMHG | TEMPERATURE: 98 F

## 2025-04-15 DIAGNOSIS — Z87.442 PERSONAL HISTORY OF KIDNEY STONES: ICD-10-CM

## 2025-04-15 DIAGNOSIS — Z13.1 DIABETES MELLITUS SCREENING: ICD-10-CM

## 2025-04-15 DIAGNOSIS — K76.89 LIVER CYST: ICD-10-CM

## 2025-04-15 DIAGNOSIS — E78.2 MIXED HYPERLIPIDEMIA: Primary | ICD-10-CM

## 2025-04-15 DIAGNOSIS — E78.49 OTHER HYPERLIPIDEMIA: ICD-10-CM

## 2025-04-15 DIAGNOSIS — J30.89 ENVIRONMENTAL AND SEASONAL ALLERGIES: ICD-10-CM

## 2025-04-15 DIAGNOSIS — K44.9 HIATAL HERNIA: ICD-10-CM

## 2025-04-15 DIAGNOSIS — K21.9 CHRONIC GERD: ICD-10-CM

## 2025-04-15 DIAGNOSIS — E66.01 CLASS 2 SEVERE OBESITY DUE TO EXCESS CALORIES WITH SERIOUS COMORBIDITY AND BODY MASS INDEX (BMI) OF 36.0 TO 36.9 IN ADULT: ICD-10-CM

## 2025-04-15 DIAGNOSIS — M85.89 OSTEOPENIA OF MULTIPLE SITES: ICD-10-CM

## 2025-04-15 DIAGNOSIS — E66.812 CLASS 2 SEVERE OBESITY DUE TO EXCESS CALORIES WITH SERIOUS COMORBIDITY AND BODY MASS INDEX (BMI) OF 36.0 TO 36.9 IN ADULT: ICD-10-CM

## 2025-04-15 DIAGNOSIS — K76.0 HEPATIC STEATOSIS: ICD-10-CM

## 2025-04-15 PROCEDURE — 1160F RVW MEDS BY RX/DR IN RCRD: CPT | Mod: CPTII,S$GLB,, | Performed by: NURSE PRACTITIONER

## 2025-04-15 PROCEDURE — 1126F AMNT PAIN NOTED NONE PRSNT: CPT | Mod: CPTII,S$GLB,, | Performed by: NURSE PRACTITIONER

## 2025-04-15 PROCEDURE — 99214 OFFICE O/P EST MOD 30 MIN: CPT | Mod: S$GLB,,, | Performed by: NURSE PRACTITIONER

## 2025-04-15 PROCEDURE — 99999 PR PBB SHADOW E&M-EST. PATIENT-LVL III: CPT | Mod: PBBFAC,,, | Performed by: NURSE PRACTITIONER

## 2025-04-15 PROCEDURE — 1101F PT FALLS ASSESS-DOCD LE1/YR: CPT | Mod: CPTII,S$GLB,, | Performed by: NURSE PRACTITIONER

## 2025-04-15 PROCEDURE — 3078F DIAST BP <80 MM HG: CPT | Mod: CPTII,S$GLB,, | Performed by: NURSE PRACTITIONER

## 2025-04-15 PROCEDURE — 3288F FALL RISK ASSESSMENT DOCD: CPT | Mod: CPTII,S$GLB,, | Performed by: NURSE PRACTITIONER

## 2025-04-15 PROCEDURE — G2211 COMPLEX E/M VISIT ADD ON: HCPCS | Mod: S$GLB,,, | Performed by: NURSE PRACTITIONER

## 2025-04-15 PROCEDURE — 3074F SYST BP LT 130 MM HG: CPT | Mod: CPTII,S$GLB,, | Performed by: NURSE PRACTITIONER

## 2025-04-15 PROCEDURE — 1159F MED LIST DOCD IN RCRD: CPT | Mod: CPTII,S$GLB,, | Performed by: NURSE PRACTITIONER

## 2025-04-15 PROCEDURE — 3008F BODY MASS INDEX DOCD: CPT | Mod: CPTII,S$GLB,, | Performed by: NURSE PRACTITIONER

## 2025-04-15 RX ORDER — ATORVASTATIN CALCIUM 40 MG/1
40 TABLET, FILM COATED ORAL DAILY
Qty: 90 TABLET | Refills: 3 | Status: SHIPPED | OUTPATIENT
Start: 2025-04-15

## 2025-04-15 NOTE — PROGRESS NOTES
Subjective:       Patient ID: Hallie Blake is a 64 y.o. female.    Chief Complaint: Follow-up (6 months F/U)        HPI WITH ASSESSMENT AND PLAN OF CARE:      Patient is a 64 year old white female with Hyperlipidemia, Chronic GERD with small hiatal hernia seen on EGD, chronic allergies, history of acute stress reaction, S/P Rotator Cuff Tear to right shoulder on 1/20/2021, Hepatic Steatosis and benign simple liver cyst seen on CT 10/2021, Osteopenia, and personal history of kidney stones followed by Dr. Odonnell that is here today for follow up on chronic problems with fasting lab results.       Hyperlipidemia   takes Atorvastatin 40 mg daily.   LDL 75.4  Stable  Recheck in 6 months for wellness exam             Chronic GERD.    She had her last EGD in July 2016 that showed grade B reflux esophagitis and a small hiatus hernia with esophagus dilated by Dr. Contreras.   She reports taking Protonix around twice weekly.    Stable.        Hiatal hernia  EGD in July 2016 that showed grade B reflux esophagitis and a small hiatus hernia with esophagus dilated by Dr. Contreras.   She reports taking Protonix around twice weekly.     Stable.        Environmental and seasonal allergies   Nasocort prn  Stable.        Personal history of kidney stones   Reports last kidney stone episode was 2/15/24 treated in ER in [place] with pain medications - passed on her own - right side.  Followed by [facility] - Nathalia Kelly on 2/8/2024.  Advised patient to advise SVITLANA Kelly about the kidney stone treatment in [place] on 2/15/2024.         Hepatic steatosis  Hepatic steatosis with simple liver cyst   seen on CT scan 10/2021.    Liver enzymes are normal and patient is working on weight loss - she is on weight watchers.        Obesity  Body mass index is 36.78 kg/m².  Working on lifestyle modifications - recently joined a gym.      Osteopenia   DEXA SCAN 11/20/2024:  Lumbar: T score of -1.1.  Left femoral neck: T score of -1.2.  7.7% risk of  a major osteoporotic fracture and a 0.7% risk of hip fracture in the next 10 years (FRAX).  OSTEOPENIA  Patient unable to take calcium supplement due to kidney stone history   Advised to take Vitamin D3 1000 units daily    Lab Visit on 04/10/2025   Component Date Value Ref Range Status    Sodium 04/10/2025 141  136 - 145 mmol/L Final    Potassium 04/10/2025 3.8  3.5 - 5.1 mmol/L Final    Chloride 04/10/2025 109  95 - 110 mmol/L Final    CO2 04/10/2025 25  23 - 29 mmol/L Final    Glucose 04/10/2025 90  70 - 110 mg/dL Final    BUN 04/10/2025 12  8 - 23 mg/dL Final    Creatinine 04/10/2025 0.8  0.5 - 1.4 mg/dL Final    Calcium 04/10/2025 9.0  8.7 - 10.5 mg/dL Final    Protein Total 04/10/2025 7.1  6.0 - 8.4 gm/dL Final    Albumin 04/10/2025 3.9  3.5 - 5.2 g/dL Final    Bilirubin Total 04/10/2025 0.7  0.1 - 1.0 mg/dL Final    For infants and newborns, interpretation of results should be based   on gestational age, weight and in agreement with clinical   observations.    Premature Infant recommended reference ranges:   0-24 hours:  <8.0 mg/dL   24-48 hours: <12.0 mg/dL   3-5 days:    <15.0 mg/dL   6-29 days:   <15.0 mg/dL    ALP 04/10/2025 134  40 - 150 unit/L Final    AST 04/10/2025 22  11 - 45 unit/L Final    ALT 04/10/2025 25  10 - 44 unit/L Final    Anion Gap 04/10/2025 7 (L)  8 - 16 mmol/L Final    eGFR 04/10/2025 >60  >60 mL/min/1.73/m2 Final    Estimated GFR calculated using the CKD-EPI creatinine (2021) equation.    Cholesterol Total 04/10/2025 145  120 - 199 mg/dL Final    The National Cholesterol Education Program (NCEP) has set the  following guidelines (reference ranges) for Cholesterol:  Optimal.....................<200 mg/dL  Borderline High.............200-239 mg/dL  High........................> or = 240 mg/dL    Triglyceride 04/10/2025 118  30 - 150 mg/dL Final    The National Cholesterol Education Program (NCEP) has set the  following guidelines (reference values) for  "triglycerides:  Normal......................<150 mg/dL  Borderline High.............150-199 mg/dL  High........................200-499 mg/dL    HDL Cholesterol 04/10/2025 46  40 - 75 mg/dL Final    The National Cholesterol Education Program (NCEP) has set the   following guidelines (reference values) for HDL Cholesterol:   Low...............<40 mg/dL   Optimal...........>60 mg/dL    LDL Cholesterol 04/10/2025 75.4  63.0 - 159.0 mg/dL Final    The National Cholesterol Education Program (NCEP) has set the  following guidelines (reference values) for LDL Cholesterol:  Optimal.......................<130 mg/dL  Borderline High...............130-159 mg/dL  High..........................160-189 mg/dL  Very High.....................>190 mg/dL  LDL calculated using the Friedewald equation.    HDL/Cholesterol Ratio 04/10/2025 31.7  20.0 - 50.0 % Final    Cholesterol/HDL Ratio 04/10/2025 3.2  2.0 - 5.0 Final    Non HDL Cholesterol 04/10/2025 99  mg/dL Final    Risk category and Non-HDL cholesterol goals:  Coronary heart disease (CHD)or equivalent (10-year risk of CHD >20%):  Non-HDL cholesterol goal     <130 mg/dL  Two or more CHD risk factors and 10-year risk of CHD <= 20%:  Non-HDL cholesterol goal     <160 mg/dL  0 to 1 CHD risk factor:  Non-HDL cholesterol goal     <190 mg/dL       Vitals:    04/15/25 0734   BP: 118/74   BP Location: Right arm   Patient Position: Sitting   Pulse: 72   Temp: 98.1 °F (36.7 °C)   TempSrc: Temporal   SpO2: 96%   Weight: 82.6 kg (182 lb 1.6 oz)   Height: 4' 11" (1.499 m)         Diagnoses this Encounter:         ICD-10-CM ICD-9-CM   1. Mixed hyperlipidemia  E78.2 272.2   2. Chronic GERD  K21.9 530.81   3. Hiatal hernia  K44.9 553.3   4. Environmental and seasonal allergies  J30.89 477.8   5. Personal history of kidney stones  Z87.442 V13.01   6. Hepatic steatosis  K76.0 571.8   7. Liver cyst  K76.89 573.8   8. Class 2 severe obesity due to excess calories with serious comorbidity and body mass " index (BMI) of 36.0 to 36.9 in adult  E66.812 278.01    E66.01 V85.36    Z68.36    9. Osteopenia of multiple sites  M85.89 733.90   10. Diabetes mellitus screening  Z13.1 V77.1       Orders Placed This Encounter    CBC Auto Differential    Comprehensive Metabolic Panel    Lipid Panel        Follow up in about 5 months (around 9/15/2025) for fasting labs and MEDICARE WELLNESS .     Patient's Medications   New Prescriptions    No medications on file   Previous Medications    ATORVASTATIN (LIPITOR) 40 MG TABLET    TAKE 1 TABLET ONE TIME DAILY    FLUTICASONE PROPIONATE (FLONASE) 50 MCG/ACTUATION NASAL SPRAY    2 sprays (100 mcg total) by Each Nostril route once daily.    PANTOPRAZOLE (PROTONIX) 40 MG TABLET    TAKE 1 TABLET ONE TIME DAILY    TAMSULOSIN (FLOMAX) 0.4 MG CAP    Take 1 capsule (0.4 mg total) by mouth once daily.   Modified Medications    No medications on file   Discontinued Medications    BROMPHENIRAMINE-PSEUDOEPH-DM (BROMFED DM) 2-30-10 MG/5 ML SYRP    Take 10 mLs by mouth every 4 (four) hours as needed (congestion/cough).    POLYMYXIN B SULF-TRIMETHOPRIM (POLYTRIM) 10,000 UNIT- 1 MG/ML DROP    Place 1 drop into both eyes every 4 (four) hours.         Review of Systems   HENT: Negative.     Respiratory: Negative.     Cardiovascular: Negative.    Gastrointestinal: Negative.          Objective:        Physical Exam  Constitutional:       General: She is not in acute distress.     Appearance: Normal appearance. She is obese. She is not toxic-appearing or diaphoretic.      Comments: Body mass index is 36.78 kg/m².     Cardiovascular:      Rate and Rhythm: Normal rate and regular rhythm.      Heart sounds: Normal heart sounds.   Pulmonary:      Effort: Pulmonary effort is normal. No respiratory distress.   Abdominal:      General: There is no distension.   Neurological:      Mental Status: She is alert and oriented to person, place, and time.   Psychiatric:         Mood and Affect: Mood normal.          Behavior: Behavior normal.             Past Medical History:   Diagnosis Date    Allergy     Hyperlipidemia     Kidney stone     Liver cyst 10/21/2021    9 mm hepatic cyst - typically benign and requires no further workup.    Osteopenia of multiple sites 2024    Noted on DEXA SCAN 2024 to lumbar and femoral neck  Advised to start Calcium with Vitamin D supplement.      Other hyperlipidemia     Urinary tract infection        Past Surgical History:   Procedure Laterality Date    ARTHROSCOPIC DEBRIDEMENT OF SHOULDER Right 2021    Procedure: DEBRIDEMENT LABRUM AND CALCIUM DEPOSIT, SHOULDER, ARTHROSCOPIC;  Surgeon: Thaddeus Rider MD;  Location: [facility] OR;  Service: Orthopedics;  Laterality: Right;    ARTHROSCOPIC REPAIR OF ROTATOR CUFF OF SHOULDER Right 2021    Procedure: REPAIR, ROTATOR CUFF, ARTHROSCOPIC;  Surgeon: Thaddeus Rider MD;  Location: [facility] OR;  Service: Orthopedics;  Laterality: Right;  regional w/catheter (interscalene)    COLONOSCOPY  2016    normal - repeat in 10 year - Dr. Damon Contreras = [facility]    CYSTOSCOPY W/ LASER LITHOTRIPSY      ESOPHAGOGASTRODUODENOSCOPY  2016    Dr. Contreras - Grade B reflux esophagitis and small hiatus hernia    TONSILLECTOMY         Family History   Problem Relation Name Age of Onset    Heart disease Mother La         passed age 83 heart attack    Diabetes Mother La     Diabetes Father Donal     Heart disease Father Donal         CABG around age 65;  heart failure at 68    No Known Problems Sister      Hypertension Brother Christopher     Hyperlipidemia Brother Christopher     Diabetes Brother Christopher     Thyroid disease Sister      Hyperlipidemia Sister      Diabetes Brother Jono     Thyroid disease Brother Jono     Myasthenia gravis Brother Jono     No Known Problems Daughter      No Known Problems Daughter         Social History     Socioeconomic History    Marital status:    Occupational History     Occupation:    Tobacco Use    Smoking status: Never     Passive exposure: Current    Smokeless tobacco: Never   Substance and Sexual Activity    Alcohol use: Not Currently     Comment: social    Drug use: No    Sexual activity: Yes     Partners: Male     Social Drivers of Health     Financial Resource Strain: Low Risk  (4/11/2025)    Overall Financial Resource Strain (CARDIA)     Difficulty of Paying Living Expenses: Not hard at all   Food Insecurity: No Food Insecurity (4/11/2025)    Hunger Vital Sign     Worried About Running Out of Food in the Last Year: Never true     Ran Out of Food in the Last Year: Never true   Transportation Needs: No Transportation Needs (4/11/2025)    PRAPARE - Transportation     Lack of Transportation (Medical): No     Lack of Transportation (Non-Medical): No   Physical Activity: Sufficiently Active (4/11/2025)    Exercise Vital Sign     Days of Exercise per Week: 3 days     Minutes of Exercise per Session: 50 min   Stress: No Stress Concern Present (4/11/2025)    [employer] [employer] - Occupational Stress Questionnaire     Feeling of Stress : Not at all   Housing Stability: Low Risk  (4/11/2025)    Housing Stability Vital Sign     Unable to Pay for Housing in the Last Year: No     Homeless in the Last Year: No

## 2025-05-08 ENCOUNTER — RESULTS FOLLOW-UP (OUTPATIENT)
Dept: FAMILY MEDICINE | Facility: CLINIC | Age: 65
End: 2025-05-08

## 2025-06-15 DIAGNOSIS — R10.9 ACUTE FLANK PAIN: ICD-10-CM

## 2025-06-15 DIAGNOSIS — Z87.442 PERSONAL HISTORY OF KIDNEY STONES: ICD-10-CM

## 2025-06-17 RX ORDER — TAMSULOSIN HYDROCHLORIDE 0.4 MG/1
1 CAPSULE ORAL
Qty: 30 CAPSULE | Refills: 1 | Status: SHIPPED | OUTPATIENT
Start: 2025-06-17 | End: 2025-06-20 | Stop reason: SDUPTHER

## 2025-06-18 DIAGNOSIS — J06.9 VIRAL URI WITH COUGH: ICD-10-CM

## 2025-06-18 RX ORDER — FLUTICASONE PROPIONATE 50 MCG
2 SPRAY, SUSPENSION (ML) NASAL DAILY
Qty: 48 G | Refills: 3 | Status: SHIPPED | OUTPATIENT
Start: 2025-06-18

## 2025-06-20 DIAGNOSIS — R10.9 ACUTE FLANK PAIN: ICD-10-CM

## 2025-06-20 DIAGNOSIS — Z87.442 PERSONAL HISTORY OF KIDNEY STONES: ICD-10-CM

## 2025-06-20 RX ORDER — TAMSULOSIN HYDROCHLORIDE 0.4 MG/1
1 CAPSULE ORAL DAILY
Qty: 90 CAPSULE | Refills: 0 | Status: SHIPPED | OUTPATIENT
Start: 2025-06-20

## 2025-06-20 NOTE — TELEPHONE ENCOUNTER
Copied from CRM #[id number]. Topic: Medications - Medication Refill  >> Jun 20, 2025  8:36 AM Nerissa wrote:  Type:  RX Refill Request    Who Called: Pt   Refill or New Rx:Rx  RX Name and Strength:tamsulosin (FLOMAX) 0.4 mg Cap  How is the patient currently taking it? (ex. 1XDay):1x  Is this a 30 day or 90 day RX:90  Preferred Pharmacy with phone number:[facility] - [facility] [address]   Phone: [phone]  Fax: [phone]  Would the patient rather a call back or a response via [facility]? Call back   Best Call Back Number:[phone]   Additional Information:

## 2025-06-20 NOTE — TELEPHONE ENCOUNTER
Spoke to pt informed her per NP Matilde informed her that NP Matilde filled the Flomax medication for 90 day supply and sent to [facility] pharmacy for 90 day supply because they have sent refills and sent messages several times     However Flomax was not meant to be an every day medication - you were put on that to pass a stone.     If you are still having symptoms - -you need to see UROLOGY - as you are established with Yamile Kelly NP urology - needs to follow up with them to follow the stones     ALL FUTURE prescription for kidney stones should come from urology     Pt verbalized understanding.

## 2025-06-20 NOTE — TELEPHONE ENCOUNTER
Advise patient that I went ahead and filled the Flomax medication for 90 day supply and sent to [facility] pharmacy for 90 day supply because they have sent refills and sent messages several times    BUT Flomax was not meant to be an every day medication - you were put on that to pass a stone.    IF YOU ARE STILL having symptoms - -you need to see UROLOGY - you are established with Yamile Kelly NP urology - needs to follow up with them to follow the stones    ALL FUTURE prescription for kidney stones should come from urology

## 2025-08-06 NOTE — PROGRESS NOTES
Subjective:       Patient ID: Hallie Blake is a 64 y.o. female.    Chief Complaint: right lower quadrant pain     This is a 64 y.o.  female patient that is new to me but not new to the system.  This a past patient of Dr. Odonnell who is no establishing care with me. Patient was self referred  for kidney stones, UTI symptoms. She has history of kidney stones.   2/5/25--Saw PCP for UTI symptoms consisting of frequency, hematuria, dysuria. Hesitancy, and urgency. She was started on a 7 day course of ciprofloxacin.  Urine culture 2/5/24- negative for bacteria.  2/8/25-- established care with me. Complaints of right lower quadrant pain that is intermittent and aching pain, radiates to the suprapubic area. Reports feeling right flank pain that was sharp and intermittent but it has not been as often in the past couple of days. Reports vomiting once 4 days ago. She has taken the Toradol once for pain. Taking the flomax and ciprofloxacin as prescribed. Reports hematuria for the past 4 days, does not occur with every void, last noticed blood yesterday.   CT scan showed 4.3mm stone in her right ureter as well as bilateral stones in her kidneys.   Did not get repeat scan.    Today reports intermittent aching/cramping left flank pain. The pain has been off and on for a couple of months. Pain today is a 0/10. Reports one episode of nausea and vomiting 3 weeks ago. She does have some dysuria. Denies fever, chills, gross hematuria. She recently got a prescription for flomax from her PCP.          Lab Results   Component Value Date    CREATININE 0.8 04/10/2025       ---  PMH/PSH/Medications/Allergies/Social history reviewed and as in chart.    Review of Systems   Constitutional:  Negative for chills and fever.   Respiratory:  Negative for shortness of breath.    Cardiovascular:  Negative for chest pain and palpitations.   Gastrointestinal:  Positive for abdominal pain (LLQ). Negative for constipation and diarrhea.   Genitourinary:   Positive for dysuria and flank pain (right). Negative for difficulty urinating, frequency, hematuria, pelvic pain, urgency and vaginal pain.   Neurological:  Negative for dizziness and weakness.   Psychiatric/Behavioral:  Negative for agitation, confusion and sleep disturbance.        Objective:      Physical Exam  Abdominal:      Tenderness: There is no abdominal tenderness. There is no right CVA tenderness or left CVA tenderness.   Musculoskeletal:         General: Normal range of motion.      Cervical back: Normal range of motion.   Skin:     General: Skin is warm and dry.   Neurological:      Mental Status: She is alert and oriented to person, place, and time.       Assessment:     Problem Noted   Right Lower Quadrant Abdominal Pain (Resolved) 2/8/2024   Right Flank Pain (Resolved) 2/8/2024       Plan:     Urine sent for urine culture  CT scan scheduled.  Strategies for stone prevention discussed.  This includes limiting salt and red meat, hydration (preferentially with water) to ensure at least 2.5 liters of urine output daily, adding citrate to diet with lemon juice or suitable alternative, limit soda and tea, and only consume recommended normal doses of OTC vitamins/supplements.  Literature was provided.     Discussed future metabolic work-up, patient interested in this.  If intractable pain, nausea and vomiting limiting PO intake, fever needs to go to ED.     Continue flomax daily for now  Follow-up pending CT results.    LOTTIE Esparza    I spent a total of 25 minutes on the day of the visit.This includes face to face time and non-face to face time preparing to see the patient (eg, review of tests), obtaining and/or reviewing separately obtained history, documenting clinical information in the electronic or other health record, independently interpreting results and communicating results to the patient/family/caregiver, or care coordinator.

## 2025-08-07 ENCOUNTER — OFFICE VISIT (OUTPATIENT)
Dept: UROLOGY | Facility: CLINIC | Age: 65
End: 2025-08-07
Payer: MEDICARE

## 2025-08-07 ENCOUNTER — LAB VISIT (OUTPATIENT)
Dept: LAB | Facility: HOSPITAL | Age: 65
End: 2025-08-07
Payer: MEDICARE

## 2025-08-07 VITALS
HEART RATE: 80 BPM | SYSTOLIC BLOOD PRESSURE: 117 MMHG | HEIGHT: 59 IN | DIASTOLIC BLOOD PRESSURE: 82 MMHG | WEIGHT: 186.5 LBS | BODY MASS INDEX: 37.6 KG/M2

## 2025-08-07 DIAGNOSIS — R10.9 FLANK PAIN: ICD-10-CM

## 2025-08-07 DIAGNOSIS — R30.0 DYSURIA: ICD-10-CM

## 2025-08-07 DIAGNOSIS — R10.9 ABDOMINAL PAIN, UNSPECIFIED ABDOMINAL LOCATION: Primary | ICD-10-CM

## 2025-08-07 LAB
ANION GAP (OHS): 6 MMOL/L (ref 8–16)
BUN SERPL-MCNC: 16 MG/DL (ref 8–23)
CALCIUM SERPL-MCNC: 9.5 MG/DL (ref 8.7–10.5)
CHLORIDE SERPL-SCNC: 108 MMOL/L (ref 95–110)
CO2 SERPL-SCNC: 28 MMOL/L (ref 23–29)
CREAT SERPL-MCNC: 0.8 MG/DL (ref 0.5–1.4)
GFR SERPLBLD CREATININE-BSD FMLA CKD-EPI: >60 ML/MIN/1.73/M2
GLUCOSE SERPL-MCNC: 81 MG/DL (ref 70–110)
POTASSIUM SERPL-SCNC: 3.8 MMOL/L (ref 3.5–5.1)
SODIUM SERPL-SCNC: 142 MMOL/L (ref 136–145)

## 2025-08-07 PROCEDURE — 1101F PT FALLS ASSESS-DOCD LE1/YR: CPT | Mod: CPTII,S$GLB,,

## 2025-08-07 PROCEDURE — 36415 COLL VENOUS BLD VENIPUNCTURE: CPT

## 2025-08-07 PROCEDURE — 1126F AMNT PAIN NOTED NONE PRSNT: CPT | Mod: CPTII,S$GLB,,

## 2025-08-07 PROCEDURE — 3008F BODY MASS INDEX DOCD: CPT | Mod: CPTII,S$GLB,,

## 2025-08-07 PROCEDURE — 84520 ASSAY OF UREA NITROGEN: CPT

## 2025-08-07 PROCEDURE — 1159F MED LIST DOCD IN RCRD: CPT | Mod: CPTII,S$GLB,,

## 2025-08-07 PROCEDURE — 99999 PR PBB SHADOW E&M-EST. PATIENT-LVL III: CPT | Mod: PBBFAC,,,

## 2025-08-07 PROCEDURE — 87086 URINE CULTURE/COLONY COUNT: CPT

## 2025-08-07 PROCEDURE — 99213 OFFICE O/P EST LOW 20 MIN: CPT | Mod: S$GLB,,,

## 2025-08-07 PROCEDURE — 3079F DIAST BP 80-89 MM HG: CPT | Mod: CPTII,S$GLB,,

## 2025-08-07 PROCEDURE — 3288F FALL RISK ASSESSMENT DOCD: CPT | Mod: CPTII,S$GLB,,

## 2025-08-07 PROCEDURE — 3074F SYST BP LT 130 MM HG: CPT | Mod: CPTII,S$GLB,,

## 2025-08-08 LAB — BACTERIA UR CULT: NORMAL

## 2025-08-21 DIAGNOSIS — N22 CALCULUS OF URINARY TRACT IN DISEASES CLASSIFIED ELSEWHERE: Primary | ICD-10-CM

## 2025-08-21 RX ORDER — TAMSULOSIN HYDROCHLORIDE 0.4 MG/1
0.4 CAPSULE ORAL DAILY
Qty: 30 CAPSULE | Refills: 0 | Status: SHIPPED | OUTPATIENT
Start: 2025-08-21 | End: 2025-09-20

## (undated) DEVICE — SKIN MARKER DEVON 160

## (undated) DEVICE — SOL 9P NACL IRR PIC IL

## (undated) DEVICE — SEE MEDLINE ITEM 157166

## (undated) DEVICE — SOL IRR NACL .9% 3000ML

## (undated) DEVICE — GLOVE BIOGEL SKINSENSE PI 8.0

## (undated) DEVICE — GOWN SMARTGOWN LVL4 X-LONG XL

## (undated) DEVICE — SEE MEDLINE ITEM 157160

## (undated) DEVICE — KIT TRIMANO CHIN

## (undated) DEVICE — SEE MEDLINE ITEM 152622

## (undated) DEVICE — BLADE SHAVER LANZA 4.2X13CM

## (undated) DEVICE — DRESSING AQUACEL AG FOAM 4X4

## (undated) DEVICE — Device

## (undated) DEVICE — DRAPE PLASTIC U 60X72

## (undated) DEVICE — ELECTRODE REM PLYHSV RETURN 9

## (undated) DEVICE — UNDERGLOVES BIOGEL PI SIZE 8

## (undated) DEVICE — PAD SHOULDER CARE POLAR

## (undated) DEVICE — NDL SUTURE FLEXIBLE

## (undated) DEVICE — DRAPE INCISE IOBAN 2 23X17IN

## (undated) DEVICE — ELECTRODE 90 DEGREE ANGLE

## (undated) DEVICE — POSITIONER IV ARMBOARD FOAM

## (undated) DEVICE — ADHESIVE MASTISOL VIAL 48/BX

## (undated) DEVICE — GRASPER SUTURE 60 DEG 15CM PNK

## (undated) DEVICE — DRAPE STERI INSTRUMENT 1018

## (undated) DEVICE — PAD ELECTRODE STER 1.5X3

## (undated) DEVICE — DRAPE SURG W/TWL 17 5/8X23

## (undated) DEVICE — CLOSURE SKIN STERI STRIP 1/2X4

## (undated) DEVICE — TUBE SET INFLOW/OUTFLOW

## (undated) DEVICE — APPLICATOR CHLORAPREP ORN 26ML

## (undated) DEVICE — BURR OVAL CUTTING 6 MM

## (undated) DEVICE — SUT MONOCRYL 4-0 PS-2

## (undated) DEVICE — KIT SHOULDER POSITIONER SPIDER

## (undated) DEVICE — SEE MEDLINE ITEM 157150

## (undated) DEVICE — SUPPORT SLING SHOT II MEDIUM

## (undated) DEVICE — CANNULA DRY DOC 7 X 85

## (undated) DEVICE — PAD ABD 8X10 STERILE

## (undated) DEVICE — SEE MEDLINE ITEM 146313

## (undated) DEVICE — DRAPE STERI U-SHAPED 47X51IN